# Patient Record
Sex: MALE | Race: ASIAN | NOT HISPANIC OR LATINO | Employment: PART TIME | URBAN - METROPOLITAN AREA
[De-identification: names, ages, dates, MRNs, and addresses within clinical notes are randomized per-mention and may not be internally consistent; named-entity substitution may affect disease eponyms.]

---

## 2017-10-18 ENCOUNTER — AMBULATORY - HEALTHEAST (OUTPATIENT)
Dept: NURSING | Facility: CLINIC | Age: 37
End: 2017-10-18

## 2017-10-18 DIAGNOSIS — Z23 NEED FOR IMMUNIZATION AGAINST INFLUENZA: ICD-10-CM

## 2018-09-25 ENCOUNTER — AMBULATORY - HEALTHEAST (OUTPATIENT)
Dept: NURSING | Facility: CLINIC | Age: 38
End: 2018-09-25

## 2018-09-25 DIAGNOSIS — Z23 NEED FOR VACCINATION: ICD-10-CM

## 2019-12-05 ENCOUNTER — OFFICE VISIT - HEALTHEAST (OUTPATIENT)
Dept: FAMILY MEDICINE | Facility: CLINIC | Age: 39
End: 2019-12-05

## 2019-12-05 DIAGNOSIS — Z13.220 SCREENING FOR LIPID DISORDERS: ICD-10-CM

## 2019-12-05 DIAGNOSIS — Z72.0 TOBACCO ABUSE: ICD-10-CM

## 2019-12-05 DIAGNOSIS — Z00.00 HEALTHY ADULT ON ROUTINE PHYSICAL EXAMINATION: ICD-10-CM

## 2019-12-05 DIAGNOSIS — M54.42 CHRONIC LEFT-SIDED LOW BACK PAIN WITH LEFT-SIDED SCIATICA: ICD-10-CM

## 2019-12-05 DIAGNOSIS — G57.02 PIRIFORMIS SYNDROME, LEFT: ICD-10-CM

## 2019-12-05 DIAGNOSIS — G89.29 CHRONIC LEFT-SIDED LOW BACK PAIN WITH LEFT-SIDED SCIATICA: ICD-10-CM

## 2019-12-05 LAB
CHOLEST SERPL-MCNC: 250 MG/DL
FASTING STATUS PATIENT QL REPORTED: ABNORMAL
HDLC SERPL-MCNC: 56 MG/DL
LDLC SERPL CALC-MCNC: 132 MG/DL
TRIGL SERPL-MCNC: 312 MG/DL

## 2019-12-05 RX ORDER — IBUPROFEN 200 MG
400 TABLET ORAL EVERY 6 HOURS PRN
Qty: 60 TABLET | Refills: 12 | Status: SHIPPED | OUTPATIENT
Start: 2019-12-05 | End: 2022-12-07

## 2019-12-05 ASSESSMENT — MIFFLIN-ST. JEOR: SCORE: 1464.07

## 2020-01-02 ENCOUNTER — OFFICE VISIT - HEALTHEAST (OUTPATIENT)
Dept: PHYSICAL THERAPY | Facility: REHABILITATION | Age: 40
End: 2020-01-02

## 2020-01-02 DIAGNOSIS — M54.16 CHRONIC LEFT LUMBAR RADICULOPATHY: ICD-10-CM

## 2020-01-02 DIAGNOSIS — M53.86 DECREASED ROM OF LUMBAR SPINE: ICD-10-CM

## 2020-01-02 DIAGNOSIS — R19.8 ABDOMINAL WEAKNESS: ICD-10-CM

## 2020-01-09 ENCOUNTER — OFFICE VISIT - HEALTHEAST (OUTPATIENT)
Dept: PHYSICAL THERAPY | Facility: REHABILITATION | Age: 40
End: 2020-01-09

## 2020-01-09 DIAGNOSIS — R19.8 ABDOMINAL WEAKNESS: ICD-10-CM

## 2020-01-09 DIAGNOSIS — M53.86 DECREASED ROM OF LUMBAR SPINE: ICD-10-CM

## 2020-01-09 DIAGNOSIS — M54.16 CHRONIC LEFT LUMBAR RADICULOPATHY: ICD-10-CM

## 2020-01-16 ENCOUNTER — OFFICE VISIT - HEALTHEAST (OUTPATIENT)
Dept: PHYSICAL THERAPY | Facility: REHABILITATION | Age: 40
End: 2020-01-16

## 2020-01-16 DIAGNOSIS — M53.86 DECREASED ROM OF LUMBAR SPINE: ICD-10-CM

## 2020-01-16 DIAGNOSIS — R19.8 ABDOMINAL WEAKNESS: ICD-10-CM

## 2020-01-16 DIAGNOSIS — M54.16 CHRONIC LEFT LUMBAR RADICULOPATHY: ICD-10-CM

## 2020-01-30 ENCOUNTER — OFFICE VISIT - HEALTHEAST (OUTPATIENT)
Dept: FAMILY MEDICINE | Facility: CLINIC | Age: 40
End: 2020-01-30

## 2020-01-30 ENCOUNTER — OFFICE VISIT - HEALTHEAST (OUTPATIENT)
Dept: PHYSICAL THERAPY | Facility: REHABILITATION | Age: 40
End: 2020-01-30

## 2020-01-30 DIAGNOSIS — M54.16 CHRONIC LEFT LUMBAR RADICULOPATHY: ICD-10-CM

## 2020-01-30 DIAGNOSIS — R19.8 ABDOMINAL WEAKNESS: ICD-10-CM

## 2020-01-30 DIAGNOSIS — M54.50 CHRONIC LEFT-SIDED LOW BACK PAIN WITHOUT SCIATICA: ICD-10-CM

## 2020-01-30 DIAGNOSIS — M53.86 DECREASED ROM OF LUMBAR SPINE: ICD-10-CM

## 2020-01-30 DIAGNOSIS — G89.29 CHRONIC LEFT-SIDED LOW BACK PAIN WITHOUT SCIATICA: ICD-10-CM

## 2020-01-30 ASSESSMENT — MIFFLIN-ST. JEOR: SCORE: 1464.65

## 2020-02-06 ENCOUNTER — HOSPITAL ENCOUNTER (OUTPATIENT)
Dept: PHYSICAL MEDICINE AND REHAB | Facility: CLINIC | Age: 40
Discharge: HOME | End: 2020-02-06
Attending: FAMILY MEDICINE

## 2020-02-06 DIAGNOSIS — M54.42 CHRONIC BILATERAL LOW BACK PAIN WITH LEFT-SIDED SCIATICA: ICD-10-CM

## 2020-02-06 DIAGNOSIS — G89.29 CHRONIC BILATERAL LOW BACK PAIN WITH LEFT-SIDED SCIATICA: ICD-10-CM

## 2020-02-06 ASSESSMENT — MIFFLIN-ST. JEOR: SCORE: 1476.66

## 2020-02-20 ENCOUNTER — HOSPITAL ENCOUNTER (OUTPATIENT)
Dept: PHYSICAL MEDICINE AND REHAB | Facility: CLINIC | Age: 40
Discharge: HOME | End: 2020-02-20
Attending: PHYSICIAN ASSISTANT

## 2020-02-20 ENCOUNTER — OFFICE VISIT - HEALTHEAST (OUTPATIENT)
Dept: PHYSICAL THERAPY | Facility: REHABILITATION | Age: 40
End: 2020-02-20

## 2020-02-20 DIAGNOSIS — R19.8 ABDOMINAL WEAKNESS: ICD-10-CM

## 2020-02-20 DIAGNOSIS — G89.29 CHRONIC BILATERAL LOW BACK PAIN WITH LEFT-SIDED SCIATICA: ICD-10-CM

## 2020-02-20 DIAGNOSIS — M54.42 CHRONIC BILATERAL LOW BACK PAIN WITH LEFT-SIDED SCIATICA: ICD-10-CM

## 2020-02-20 DIAGNOSIS — M54.16 CHRONIC LEFT LUMBAR RADICULOPATHY: ICD-10-CM

## 2020-02-20 DIAGNOSIS — M53.86 DECREASED ROM OF LUMBAR SPINE: ICD-10-CM

## 2020-02-28 ENCOUNTER — HOSPITAL ENCOUNTER (OUTPATIENT)
Dept: MRI IMAGING | Facility: HOSPITAL | Age: 40
Discharge: HOME | End: 2020-02-28
Attending: PHYSICIAN ASSISTANT

## 2020-02-28 DIAGNOSIS — M54.42 CHRONIC BILATERAL LOW BACK PAIN WITH LEFT-SIDED SCIATICA: ICD-10-CM

## 2020-02-28 DIAGNOSIS — G89.29 CHRONIC BILATERAL LOW BACK PAIN WITH LEFT-SIDED SCIATICA: ICD-10-CM

## 2020-03-02 ENCOUNTER — COMMUNICATION - HEALTHEAST (OUTPATIENT)
Dept: PHYSICAL MEDICINE AND REHAB | Facility: CLINIC | Age: 40
End: 2020-03-02

## 2020-03-03 ENCOUNTER — COMMUNICATION - HEALTHEAST (OUTPATIENT)
Dept: FAMILY MEDICINE | Facility: CLINIC | Age: 40
End: 2020-03-03

## 2020-03-05 ENCOUNTER — HOSPITAL ENCOUNTER (OUTPATIENT)
Dept: PHYSICAL MEDICINE AND REHAB | Facility: CLINIC | Age: 40
Discharge: HOME | End: 2020-03-05
Attending: PHYSICIAN ASSISTANT

## 2020-03-05 DIAGNOSIS — M51.26 LUMBAR DISC HERNIATION: ICD-10-CM

## 2020-03-05 DIAGNOSIS — M47.816 LUMBAR FACET ARTHROPATHY: ICD-10-CM

## 2020-03-13 ENCOUNTER — COMMUNICATION - HEALTHEAST (OUTPATIENT)
Dept: FAMILY MEDICINE | Facility: CLINIC | Age: 40
End: 2020-03-13

## 2020-03-17 ENCOUNTER — COMMUNICATION - HEALTHEAST (OUTPATIENT)
Dept: PHYSICAL MEDICINE AND REHAB | Facility: CLINIC | Age: 40
End: 2020-03-17

## 2020-03-18 ENCOUNTER — COMMUNICATION - HEALTHEAST (OUTPATIENT)
Dept: PHYSICAL MEDICINE AND REHAB | Facility: CLINIC | Age: 40
End: 2020-03-18

## 2020-04-07 ENCOUNTER — COMMUNICATION - HEALTHEAST (OUTPATIENT)
Dept: PHYSICAL MEDICINE AND REHAB | Facility: CLINIC | Age: 40
End: 2020-04-07

## 2021-03-08 ENCOUNTER — OFFICE VISIT - HEALTHEAST (OUTPATIENT)
Dept: FAMILY MEDICINE | Facility: CLINIC | Age: 41
End: 2021-03-08

## 2021-03-08 DIAGNOSIS — H93.13 TINNITUS, BILATERAL: ICD-10-CM

## 2021-03-08 DIAGNOSIS — G89.29 CHRONIC LEFT-SIDED LOW BACK PAIN WITH LEFT-SIDED SCIATICA: ICD-10-CM

## 2021-03-08 DIAGNOSIS — M54.42 CHRONIC LEFT-SIDED LOW BACK PAIN WITH LEFT-SIDED SCIATICA: ICD-10-CM

## 2021-03-08 DIAGNOSIS — Z72.0 TOBACCO ABUSE: ICD-10-CM

## 2021-03-08 DIAGNOSIS — Z13.220 SCREENING FOR LIPID DISORDERS: ICD-10-CM

## 2021-03-08 LAB
CHOLEST SERPL-MCNC: 262 MG/DL
FASTING STATUS PATIENT QL REPORTED: ABNORMAL
HDLC SERPL-MCNC: 38 MG/DL
LDLC SERPL CALC-MCNC: 180 MG/DL
LDLC SERPL CALC-MCNC: ABNORMAL MG/DL
TRIGL SERPL-MCNC: 432 MG/DL

## 2021-03-08 RX ORDER — LORATADINE 10 MG/1
10 TABLET ORAL DAILY
Qty: 30 TABLET | Refills: 11 | Status: SHIPPED | OUTPATIENT
Start: 2021-03-08 | End: 2022-12-07

## 2021-03-09 ENCOUNTER — HOSPITAL ENCOUNTER (OUTPATIENT)
Dept: PHYSICAL MEDICINE AND REHAB | Facility: CLINIC | Age: 41
Discharge: HOME | End: 2021-03-09
Attending: PHYSICIAN ASSISTANT

## 2021-03-09 DIAGNOSIS — M51.26 LUMBAR DISC HERNIATION: ICD-10-CM

## 2021-03-09 DIAGNOSIS — M47.816 LUMBAR FACET ARTHROPATHY: ICD-10-CM

## 2021-03-09 DIAGNOSIS — M54.16 LUMBAR RADICULAR PAIN: ICD-10-CM

## 2021-03-09 RX ORDER — GABAPENTIN 300 MG/1
300 CAPSULE ORAL DAILY
Qty: 90 CAPSULE | Refills: 2 | Status: SHIPPED | OUTPATIENT
Start: 2021-03-09 | End: 2022-03-09

## 2021-03-09 ASSESSMENT — MIFFLIN-ST. JEOR: SCORE: 1585.98

## 2021-03-29 ENCOUNTER — OFFICE VISIT - HEALTHEAST (OUTPATIENT)
Dept: OTOLARYNGOLOGY | Facility: CLINIC | Age: 41
End: 2021-03-29

## 2021-03-29 ENCOUNTER — OFFICE VISIT - HEALTHEAST (OUTPATIENT)
Dept: AUDIOLOGY | Facility: CLINIC | Age: 41
End: 2021-03-29

## 2021-03-29 DIAGNOSIS — H90.0 CONDUCTIVE HEARING LOSS, BILATERAL: ICD-10-CM

## 2021-03-29 DIAGNOSIS — H65.91 MEE (MIDDLE EAR EFFUSION), RIGHT: ICD-10-CM

## 2021-03-29 DIAGNOSIS — Z96.22 S/P TYMPANOSTOMY TUBE PLACEMENT: ICD-10-CM

## 2021-03-29 DIAGNOSIS — H69.93 DYSFUNCTION OF BOTH EUSTACHIAN TUBES: ICD-10-CM

## 2021-03-29 RX ORDER — OFLOXACIN 3 MG/ML
SOLUTION AURICULAR (OTIC)
Qty: 5 ML | Refills: 0 | Status: SHIPPED | OUTPATIENT
Start: 2021-03-29 | End: 2022-12-07

## 2021-04-06 ENCOUNTER — HOSPITAL ENCOUNTER (OUTPATIENT)
Dept: PHYSICAL MEDICINE AND REHAB | Facility: CLINIC | Age: 41
Discharge: HOME | End: 2021-04-06
Attending: PHYSICIAN ASSISTANT

## 2021-04-06 DIAGNOSIS — M47.816 LUMBAR FACET ARTHROPATHY: ICD-10-CM

## 2021-04-06 ASSESSMENT — MIFFLIN-ST. JEOR: SCORE: 1585.98

## 2021-05-04 ENCOUNTER — AMBULATORY - HEALTHEAST (OUTPATIENT)
Dept: OTOLARYNGOLOGY | Facility: CLINIC | Age: 41
End: 2021-05-04

## 2021-05-04 DIAGNOSIS — H69.93 DYSFUNCTION OF BOTH EUSTACHIAN TUBES: ICD-10-CM

## 2021-05-04 DIAGNOSIS — H65.91 MEE (MIDDLE EAR EFFUSION), RIGHT: ICD-10-CM

## 2021-05-04 DIAGNOSIS — H90.0 CONDUCTIVE HEARING LOSS, BILATERAL: ICD-10-CM

## 2021-05-05 ENCOUNTER — COMMUNICATION - HEALTHEAST (OUTPATIENT)
Dept: PHYSICAL MEDICINE AND REHAB | Facility: CLINIC | Age: 41
End: 2021-05-05

## 2021-05-06 ENCOUNTER — HOSPITAL ENCOUNTER (OUTPATIENT)
Dept: PHYSICAL MEDICINE AND REHAB | Facility: CLINIC | Age: 41
Discharge: HOME | End: 2021-05-06
Attending: PHYSICIAN ASSISTANT

## 2021-05-06 DIAGNOSIS — M47.816 LUMBAR FACET ARTHROPATHY: ICD-10-CM

## 2021-05-26 ENCOUNTER — HOSPITAL ENCOUNTER (OUTPATIENT)
Dept: PHYSICAL MEDICINE AND REHAB | Facility: CLINIC | Age: 41
Discharge: HOME | End: 2021-05-26
Attending: PHYSICIAN ASSISTANT

## 2021-05-26 DIAGNOSIS — M47.816 LUMBAR FACET ARTHROPATHY: ICD-10-CM

## 2021-05-26 DIAGNOSIS — M25.552 HIP PAIN, LEFT: ICD-10-CM

## 2021-05-26 DIAGNOSIS — M79.18 MYOFASCIAL PAIN: ICD-10-CM

## 2021-05-26 ASSESSMENT — MIFFLIN-ST. JEOR: SCORE: 1585.98

## 2021-06-04 VITALS
WEIGHT: 139.5 LBS | HEIGHT: 65 IN | RESPIRATION RATE: 18 BRPM | DIASTOLIC BLOOD PRESSURE: 64 MMHG | BODY MASS INDEX: 23.24 KG/M2 | SYSTOLIC BLOOD PRESSURE: 110 MMHG | HEART RATE: 91 BPM | OXYGEN SATURATION: 100 % | TEMPERATURE: 98.6 F

## 2021-06-04 VITALS
DIASTOLIC BLOOD PRESSURE: 60 MMHG | SYSTOLIC BLOOD PRESSURE: 106 MMHG | BODY MASS INDEX: 23.07 KG/M2 | OXYGEN SATURATION: 98 % | TEMPERATURE: 97.8 F | WEIGHT: 138.5 LBS | HEIGHT: 65 IN | HEART RATE: 85 BPM | RESPIRATION RATE: 18 BRPM

## 2021-06-04 VITALS — WEIGHT: 136.9 LBS | HEIGHT: 67 IN | BODY MASS INDEX: 21.49 KG/M2

## 2021-06-04 NOTE — PROGRESS NOTES
[facility] Rehabilitation Certification Request    January 2, 2020      Patient: Beh G Dwe  MR Number: [id number]  YOB: 1980  Date of Visit: 1/2/2020      Dear Dr. Taylor Holden:    Thank you for this referral.   We are seeing Beh Dwe in Physical Therapy for left lumbar radiculopathy.    Medicare and/or Medicaid requires physician review and approval of the treatment plan. Please review the plan of care and verify that you agree with the therapy plan of care by co-signing this note.      Plan of Care  Authorization / Certification Start Date: 01/02/20  Authorization / Certification End Date: 04/01/20  Authorization / Certification Number of Visits: 12  Communication with: Referral Source  Patient Related Instruction: Nature of Condition;Treatment plan and rationale;Self Care instruction;Basis of treatment;Body mechanics;Posture;Precautions;Next steps;Expected outcome  Times per Week: 1-2  Number of Weeks: 8  Number of Visits: 12  Precautions / Restrictions : none  Therapeutic Exercise: ROM;Stretching;Strengthening  Neuromuscular Reeducation: kinesio tape;posture;core  Manual Therapy: joint mobilization;soft tissue mobilization;myofascial release  Modalities: traction;TENS;hot pack (PRN)      Goals:  Pt. will be independent with home exercise program in : 6 weeks  Pt. will bend: to dress;to clean;with no pain;Comment  Comment:: in 8 weeks.  Patient will twist/turn : in bed;while standing;with no pain;Comment  Comment:: in 8 weeks.  Patient will sit: 60 minutes;with no pain;Comment  Comment: in 8 weeks.    Patient will decrease : HOMAR score;for improved quality of life;by _ points;in 6 weeks  by ___ points: >= 30% from IE  Patient will show increased : strength for ____  Patient will show increased strength for : lifting/carrying up to 40 lb at work without pain in 8 weeks.        If you have any questions or concerns, please don't hesitate to call.    Sincerely,      Ronald Davis,  PT        Physician recommendation:     ___ Follow therapist's recommendation        ___ Modify therapy      *Physician co-signature indicates they certify the need for these services furnished within this plan and while under their care.    [facility]   Initial Evaluation    Patient Name: Beh G Dwe  Date of evaluation: 1/2/2020  PRECAUTIONS: none  Referral Diagnosis:   M54.42, G89.29 (ICD-10-CM) - Chronic left-sided low back pain with left-sided sciatica   G57.02 (ICD-10-CM) - Piriformis syndrome, left     Referring provider: Tyalor Holden MD  Visit Diagnosis:     ICD-10-CM    1. Chronic left lumbar radiculopathy M54.16    2. Decreased ROM of lumbar spine M53.86    3. Abdominal weakness R19.8        Assessment:      Pt. is appropriate for skilled PT intervention as outlined in the Plan of Care (POC).  Pt. is a good candidate for skilled PT services to improve pain levels and function.  Goals and POC established in collaboration with the patient.  Pt presents to PT with chronic left-sided low back (constant) and L LE (intermittent )radiating pain and paresthesias, most consistent with lumbar radiculopathy within the L5 distribution.  HEP initiated today, and patient with good tolerance for all without peripheralization of sx.    Goals:  Pt. will be independent with home exercise program in : 6 weeks  Pt. will bend: to dress;to clean;with no pain;Comment  Comment:: in 8 weeks.  Patient will twist/turn : in bed;while standing;with no pain;Comment  Comment:: in 8 weeks.  Patient will sit: 60 minutes;with no pain;Comment  Comment: in 8 weeks.    Patient will decrease : HOMAR score;for improved quality of life;by _ points;in 6 weeks  by ___ points: >= 30% from IE  Patient will show increased : strength for ____  Patient will show increased strength for : lifting/carrying up to 40 lb at work without pain in 8 weeks.      Patient's expectations/goals are realistic.    Barriers to Learning or  "Achieving Goals:  Chronicity of the problem.  Language barriers.  Tobacco use     Plan / Patient Instructions:        Plan of Care:   Authorization / Certification Start Date: 20  Authorization / Certification End Date: 20  Authorization / Certification Number of Visits: 12  Communication with: Referral Source  Patient Related Instruction: Nature of Condition;Treatment plan and rationale;Self Care instruction;Basis of treatment;Body mechanics;Posture;Precautions;Next steps;Expected outcome  Times per Week: 1-2  Number of Weeks: 8  Number of Visits: 12  Precautions / Restrictions : none  Therapeutic Exercise: ROM;Stretching;Strengthening  Neuromuscular Reeducation: kinesio tape;posture;core  Manual Therapy: joint mobilization;soft tissue mobilization;myofascial release  Modalities: traction;TENS;hot pack (PRN)      Plan for next visit: Review HEP, adding ex per flow sheet over time as tolerated.     Subjective:       History of Present Illness:    Beh is a 39 y.o. male who presents to therapy today with complaints of left-sided low back pain with L LE radiating pain, with associated tingling.   Onset: Chronic x5-6 years. Denies specific injury or incident, but relates possibly to a lot of heavier lifting.  Duration: Intermittent L LE pain, but low back is constant  Worse with coughing, first thing in the AM, sitting (10 min max), bending, turning, carrying heavier items  Better with unsure. Reports he is trying to exercise, but is unsure what to do to help his problem  Pain Medication: Denies pain medication use  Sleep: Denies waking due to pain.    No previous low back surgeries.  No changes in bowel or bladder function.    Pt seeks PT to \"help with the pain and be able to do more.\"    Pain Ratin  Pain rating at best: 8  Pain rating at worst: 10  Pain description: aching, dull, pain, sharp and tingling     Objective:      Patient Outcome Measures :    Modified Oswestry Low Back Pain Disablity " Questionnaire  in %: 46     Scores range from 0-100%, where a score of 0% represents minimal pain and maximal function. The minimal clinically important difference is a score reduction of 12%.    Examination  1. Chronic left lumbar radiculopathy     2. Decreased ROM of lumbar spine     3. Abdominal weakness       Involved side: Left  Posture Observation:      General sitting posture is  normal.  General standing posture is normal.    Lumbar ROM: All % of WNL  Date:      *Indicate scale AROM AROM AROM   Lumbar Flexion 100 and PF in low back     Lumbar Extension 50 and PF in low back      Right Left Right Left Right Left   Lumbar Sidebending 75 and PF in low back 75 and PF in low back.xp       Lumbar Rotation 75 with mild pain 75       Thoracic Flexion      Thoracic Extension      Thoracic Sidebending         Thoracic Rotation           Slump Test: (+) on L LE, negative on R.  SLR: L 50* with (+) neural tension, R 75*.  Increased muscle spasm and TTP present L lumbar paraspinals. Denies TTP glutes.  (+) pain and hypomobility noted with central and L unilateral PAs to lumbar spine, most notably at L4 and L5.  Repeated extension (MERLINE positioning and prone press ups): Pt reports slight low back discomfort, but no peripheralization of L LE pain (none present when lying prone).  Repeated flexion (DKTC): no peripheralization of L LE pain, and no LBP.  Fair TAC that fatigues quickly.    Treatment Today     TREATMENT MINUTES COMMENTS   Evaluation 20 -Discussed therapy diagnosis, prognosis, POC, relevant anatomy with use of spine model and basis for tx.   Self-care/ Home management     Manual therapy     Neuromuscular Re-education     Therapeutic Activity     Therapeutic Exercises 26 Exercises:  Exercise #1: MERLINE; Prone press ups - H  Comment #1: 60 sec; 10x  Exercise #2: LTR, DKTC - H  Comment #2: 10x2 sec B; x60 sec  Exercise #3: SKTC sciatic nerve glides - H  Comment #3: 10x B  Exercise #4: PPT - H  Comment #4: 10x3 sec  hold  Exercise #6: Bridges - add as able  Comment #6: TB Sidestepping - add as able  Exercise #7: Planks - add as able    Reviewed and discussed to avoid repetitive bending forward to aid healing.   Gait training     Modality__________________                Total 46    Blank areas are intentional and mean the treatment did not include these items.            PT Evaluation Code: (Please list factors)  Patient History/Comorbidities: See above  Examination: Lumbar radiculopathy  Clinical Presentation: Stable  Clinical Decision Making: Low    Patient History/  Comorbidities Examination  (body structures and functions, activity limitations, and/or participation restrictions) Clinical Presentation Clinical Decision Making (Complexity)   No documented Comorbidities or personal factors 1-2 Elements Stable and/or uncomplicated Low   1-2 documented comorbidities or personal factor 3 Elements Evolving clinical presentation with changing characteristics Moderate   3-4 documented comorbidities or personal factors 4 or more Unstable and unpredictable High           Ronald Davis  1/2/2020  8:54 AM

## 2021-06-05 VITALS
DIASTOLIC BLOOD PRESSURE: 80 MMHG | TEMPERATURE: 98.1 F | RESPIRATION RATE: 18 BRPM | WEIGHT: 161 LBS | BODY MASS INDEX: 25.6 KG/M2 | HEART RATE: 92 BPM | OXYGEN SATURATION: 97 % | SYSTOLIC BLOOD PRESSURE: 110 MMHG

## 2021-06-05 VITALS — BODY MASS INDEX: 25.27 KG/M2 | HEIGHT: 67 IN | WEIGHT: 161 LBS

## 2021-06-05 VITALS — WEIGHT: 161 LBS | HEIGHT: 67 IN | BODY MASS INDEX: 25.27 KG/M2

## 2021-06-05 NOTE — PROGRESS NOTES
ASSESSMENT: Beh G Dwe is a 39 y.o. male with past medical history significant for nicotine dependence who presents today for new patient evaluation of a 2-month history of left greater than right low back pain.  Pain previously radiated down the left lower extremity, but that has resolved with physical therapy.  Unfortunately, back pain is getting progressively worse.  Patient has not had any advanced imaging of the lumbar spine.  I think it is likely that the patient has a disc bulge or protrusion on the left at L5-S1 which is healing.  There may also be lumbar facet arthropathy.  Patient reproduction of his low back pain with lumbar facet loading maneuver on the left.  He was neurologically intact on exam.    HOMAR: 32  Who 5: 23    PLAN:  A shared decision making model was used.  The patient's values and choices were respected.  The following represents what was discussed and decided upon by the physician assistant and the patient.  A professional  is present for the visit.    1.  DIAGNOSTIC TESTS: I ordered an MRI lumbar spine for further evaluation.    2.  PHYSICAL THERAPY:  Patient is currently in physical therapy.  He has had 4 sessions of far.  His physical therapist recommends an additional 3-4 sessions.  Encouraged to continue with physical therapy and the home exercises.    3.  MEDICATIONS:    -Nabumetone 500 mg twice daily as needed was prescribed.  He will use this instead of ibuprofen.  -Patient could also use Tylenol as needed.  -Since radicular pain has improved, I did not recommend gabapentin, however, if radicular symptoms were to return, I would recommend adding gabapentin.    4.  INTERVENTIONS: No interventions were ordered.  Patient may benefit from interventional pain management if he fails to improve with conservative treatment, depending on the results of advanced imaging studies.  -Based on his exam today, would likely begin with lumbar facet joint injection.  - However, if he was  experiencing more radicular pain again, I recommend an epidural.    5.  PATIENT EDUCATION: Patient is in agreement the above plan.  All questions were answered.  -We did not discuss smoking cessation at today's visit.  I will plan to bring this up at future visits.    6.  WORK: I provided an updated work note maintaining his light duty work restriction of no lifting more than 10 pounds.  Work restrictions will be reevaluated in about 2 weeks.    7.  FOLLOW-UP:   A nurse will call the patient with the results of his MRI lumbar spine.  At that time he may order an injection.  I will plan to see the patient back in the clinic in 2 weeks to update workability.  If he has questions or concerns in the meantime, he should not hesitate to call.      SUBJECTIVE:  Beh G Dwe  Is a 39 y.o. male who presents today in consultation at request of Dr. Holden for new patient evaluation of low back pain.  Patient reports that he began to have pain about 2 months ago.  He denies any specific injury or event to cause the pain.  He states that initially the pain radiated down the left leg.  He states that with physical therapy the leg pain resolved.  Unfortunately, the back pain is getting progressively worse.  He is referred to our clinic for further evaluation and treatment.  Patient states that he did have a similar episode of pain in 2012 or 2013 which resolved with medication.    Patient complains of left greater than right low back pain.  Pain is located the lumbosacral junction.  Patient is not currently having any pain down the legs.  However, previously the pain radiated into the left buttock, down the posterior thigh, into the posterior calf.  Patient reports that current pain is aggravated with lifting and carrying items.  Pain is alleviated with doing his exercises.  He denies any numbness, tingling, or weakness down the legs.  He denies any loss of bowel or bladder control.  Denies any recent fevers, chills, or  sweats.    Patient is currently in physical therapy.  He has had 4 sessions.  He does his home exercises.  He uses ibuprofen 400 mg as needed.  He states that this helps somewhat.    Patient has been working with a 10 pound lifting restriction.  He works in food production.  Patient states that he thinks he would have increased pain if he tried to lift more than 10 pounds.    Current Outpatient Medications on File Prior to Visit   Medication Sig Dispense Refill     ibuprofen (ADVIL,MOTRIN) 200 MG tablet Take 2 tablets (400 mg total) by mouth every 6 (six) hours as needed for pain. 60 tablet 12       No Known Allergies    Past Medical History:   Diagnosis Date     Tuberculosis     latent TB.  completed a course of INR in 2010        Patient Active Problem List   Diagnosis     Headache     Viral Gastroenteritis     Nicotine Dependence     Acute Sore Throat     Surgical history: None    Family history: None.  Patient reports family is healthy.    Social history: Patient is .  He has 5 children.  He works in fruit production.  He has been working with a 10 pound lifting restriction.  He smokes 4 to 5 cigarettes/day.  He drinks alcohol twice per month.  He denies illicit drug use.    ROS: Positive for joint pain, muscle pain.  Specifically negative for bowel/bladder dysfunction, fevers,chills, appetite changes, unexplained weight loss.   Otherwise 13 systems reviewed are negative.  Please see the patient's intake questionnaire from today for details.      OBJECTIVE:  PHYSICAL EXAMINATION:    CONSTITUTIONAL:  Vital signs as above.  No acute distress.  The patient is well nourished and well groomed.  PSYCHIATRIC:  The patient is awake, alert, oriented to person, place, time and answering questions appropriately with clear speech.    HEENT: Normocephalic, atraumatic.  Sclera clear.  Neck is supple.  SKIN:  Skin over the face, bilateral lower extremities, and posterior torso is clean, dry, intact without rashes.     GAIT:  Gait is non-antalgic.  The patient is able to heel and toe walk without significant difficulty.    STANDING EXAMINATION: Tender to palpation left greater than right lower lumbar paraspinous muscles at L5-S1.  Lumbar flexion intact.  Lumbar extension mildly restricted.  Lumbar facet loading maneuvers reproduce low back pain on the left.  MUSCLE STRENGTH:  The patient has 5/5 strength for the bilateral hip flexors, knee flexors/extensors, ankle dorsiflexors/plantar flexors, great toe extensors, ankle evertors/invertors.  NEUROLOGICAL:  2/4 patellar, and achilles reflexes bilaterally.  Negative Babinski's bilaterally.  No ankle clonus bilaterally. Sensation to light touch is intact in the bilateral L4, L5, and S1 dermatomes.  VASCULAR:  2/4 dorsalis pedis pulses bilaterally.  Bilateral lower extremities are warm.  There is no pitting edema of the bilateral lower extremities.  ABDOMINAL:  Soft, non-distended, non-tender throughout all quadrants.  No pulsatile mass palpated in the left lower quadrant.  LYMPH NODES:  No palpable or tender inguinal lymph nodes.  MUSCULOSKELETAL: Straight leg raise on the left reproduces left-sided low back pain and extends into the left buttock.  Negative straight leg raise right.

## 2021-06-05 NOTE — PROGRESS NOTES
"S:  Beh G Dwe is a 39 y.o. male who comes to the clinic today for  1.  Back pain:  Greatly improved.  It is still present daily but is much less bothersome with the PT and the exercises.  His pain does not keep him from doing his daily activities or working.    He denies any n/t/w, and any bowel or bladder problems.   His worst pain right now is when he wakes up in the am.  He doesn't want to move a lot at that time due to the pain.  As he goes through the day, his pain improves.    At work he is limited to 10lbs of lifting.        I reviewed the pertinent family, social, surgical, medical history.    Has cut way down on smoking. He is down to 1-2 cigarettes per day and is using nicotine patches.      O:  /64   Pulse 91   Temp 98.6  F (37  C) (Oral)   Resp 18   Ht 5' 5\" (1.651 m)   Wt 139 lb 8 oz (63.3 kg)   SpO2 100%   BMI 23.21 kg/m    Gen:  Nad, alert  Ms: Full strength in bilateral lower extremities 5 out of 5.  Deep tendon reflexes are symmetric.  Straight leg raise is negative bilaterally.  Sensation is intact.  He has some mild tenderness along his left paraspinal muscles and lumbar muscles.  No tenderness over his lumbar spine.    Patient Active Problem List   Diagnosis     Headache     Viral Gastroenteritis     Nicotine Dependence     Acute Sore Throat     Current Outpatient Medications on File Prior to Visit   Medication Sig Dispense Refill     ibuprofen (ADVIL,MOTRIN) 200 MG tablet Take 2 tablets (400 mg total) by mouth every 6 (six) hours as needed for pain. 60 tablet 12     No current facility-administered medications on file prior to visit.           No results found for this or any previous visit (from the past 48 hour(s)).     No images are attached to the encounter or orders placed in the encounter.       Assessment/Plan:  1. Chronic left-sided low back pain without sciatica  To spine clinic  Maintain 10lb weight limit at work for now  Continue with physical therapy.  I did commend him " on his efforts to quit smoking and encouraged him in that direction.  - Ambulatory referral to [facility]          Taylor Holden   1/30/2020 10:17 AM

## 2021-06-05 NOTE — PROGRESS NOTES
[facility] Daily Progress     Patient Name: Beh G Dwe  Date: 2020  Visit #: 3/12 through 20  PTA visit #: 1   PRECAUTIONS: none  Referral Diagnosis:   M54.42, G89.29 (ICD-10-CM) - Chronic left-sided low back pain with left-sided sciatica   G57.02 (ICD-10-CM) - Piriformis syndrome, left     Referring provider: Taylor Holden MD  Visit Diagnosis:     ICD-10-CM    1. Chronic left lumbar radiculopathy M54.16    2. Decreased ROM of lumbar spine M53.86    3. Abdominal weakness R19.8          Assessment:     Pt presents to PT with chronic left-sided low back (constant) and L LE (intermittent) radiating pain and paresthesias, most consistent with lumbar radiculopathy within the L5 distribution.  HEP/POC compliance is  good .  Patient is appropriate to continue with skilled physical therapy intervention, as indicated by initial plan of care.     Although patient reports no overall change in pain thus far, he is compliant with HEP and without peripheralization of sx during performance. He reports decreased intensity of low back pain today with MT.    Goal Status: Ongoing  Pt. will be independent with home exercise program in : 6 weeks  Pt. will bend: to dress;to clean;with no pain;Comment  Comment:: in 8 weeks.  Patient will twist/turn : in bed;while standing;with no pain;Comment  Comment:: in 8 weeks.  Patient will sit: 60 minutes;with no pain;Comment  Comment: in 8 weeks.    Patient will decrease : HOMAR score;for improved quality of life;by _ points;in 6 weeks  by ___ points: >= 30% from IE  Patient will show increased : strength for ____  Patient will show increased strength for : lifting/carrying up to 40 lb at work without pain in 8 weeks.      Plan / Patient Education:     Review forward planks  Continue MT per below, KT as needed    Subjective:     Pain Ratin/10 low back   Working standing and lifting  Denies leg pain  KT feels good seems to help  Ex 2x/day  Walking 10 mins   Sitting 10  mins than increase aching    Objective:      present throughout session.  Reviewed HEP with min cueing for correct performance of ex. Denies L LE radiating pain with any ex.  Moderately increased muscle spasm present lumbar paraspinals; improved with MT, with patient reporting decreased intensity of LBP.  Cues for HEP needed    Exercises:  Exercise #1: MERLINE; Prone press ups - H  Comment #1: 60 sec; 5x  Exercise #2: LTR, DKTC - H  Comment #2: 10x2 sec B; x60 sec  Exercise #3: SKTC sciatic nerve glides - H  Comment #3: 10x B  Exercise #4: PPT - H  Comment #4: 10x3 sec hold  Exercise #6: Bridges: 10x3 sec- H  Comment #6: TB Sidestepping: 3x10 ft L/R with OTB - H  Exercise #7: Planks - add as able    Treatment Today     TREATMENT MINUTES COMMENTS   Evaluation     Self-care/ Home management     Manual therapy 9 Prone MFR to lumbar paraspinals   Neuromuscular Re-education     Therapeutic Activity     Therapeutic Exercises 18 Ex per flow sheet  KTape to lumbar paraspinals, inhibition   Gait training     Modality__________________                Total 27    Blank areas are intentional and mean the treatment did not include these items.       Ganga Wiggins  1/16/2020

## 2021-06-05 NOTE — PROGRESS NOTES
[facility] Daily Progress     Patient Name: Beh G Dwe  Date: 2020  Visit #:  through 20  PTA visit #:    PRECAUTIONS: none  Referral Diagnosis:   M54.42, G89.29 (ICD-10-CM) - Chronic left-sided low back pain with left-sided sciatica   G57.02 (ICD-10-CM) - Piriformis syndrome, left     Referring provider: Taylor Holden MD  Visit Diagnosis:     ICD-10-CM    1. Chronic left lumbar radiculopathy M54.16    2. Decreased ROM of lumbar spine M53.86    3. Abdominal weakness R19.8          Assessment:     Pt presents to PT with chronic left-sided low back (constant) and L LE (intermittent) radiating pain and paresthesias, most consistent with lumbar radiculopathy within the L5 distribution.  HEP/POC compliance is  good .  Patient is appropriate to continue with skilled physical therapy intervention, as indicated by initial plan of care.     Although patient reports no overall change in pain thus far, he is compliant with HEP and without peripheralization of sx during performance. He reports decreased intensity of low back pain today with MT.    Goal Status:  Pt. will be independent with home exercise program in : 6 weeks  Pt. will bend: to dress;to clean;with no pain;Comment  Comment:: in 8 weeks.  Patient will twist/turn : in bed;while standing;with no pain;Comment  Comment:: in 8 weeks.  Patient will sit: 60 minutes;with no pain;Comment  Comment: in 8 weeks.    Patient will decrease : HOMAR score;for improved quality of life;by _ points;in 6 weeks  by ___ points: >= 30% from IE  Patient will show increased : strength for ____  Patient will show increased strength for : lifting/carrying up to 40 lb at work without pain in 8 weeks.      Plan / Patient Education:     Review newly added bridges, TB sidestepping, and add forward/side planks as able.  Continue MT per below, and assess KTape effectiveness.    Subjective:     Pain Ratin/10 low back, intermittent L LE   Doing HEP regularly. Denies L  LE pain with any exercises.    Objective:      present throughout session.  Reviewed HEP with min cueing for correct performance of ex. Denies L LE radiating pain with any ex.  Moderately increased muscle spasm present lumbar paraspinals; improved with MT, with patient reporting decreased intensity of LBP.  Pt education provided on benefits, mechanism, use, and precautions with KTape.    Exercises:  Exercise #1: MERLINE; Prone press ups - H  Comment #1: 60 sec; 5x  Exercise #2: LTR, DKTC - H  Comment #2: 10x2 sec B; x60 sec  Exercise #3: SKTC sciatic nerve glides - H  Comment #3: 10x B  Exercise #4: PPT - H  Comment #4: 10x3 sec hold  Exercise #6: Bridges: 10x3 sec- H  Comment #6: TB Sidestepping: 3x10 ft L/R with OTB - H  Exercise #7: Planks - add as able    Treatment Today     TREATMENT MINUTES COMMENTS   Evaluation     Self-care/ Home management     Manual therapy 9 Prone MFR to lumbar paraspinals   Neuromuscular Re-education     Therapeutic Activity     Therapeutic Exercises 18 Ex per flow sheet  KTape to lumbar paraspinals, inhibition   Gait training     Modality__________________                Total 27    Blank areas are intentional and mean the treatment did not include these items.       Ronald Davis  1/9/2020

## 2021-06-05 NOTE — PROGRESS NOTES
[facility] Daily Progress     Patient Name: Beh G Dwe  Date: 1/30/2020  Visit #: 4/12 through 4/1/20  PTA visit #: 1   PRECAUTIONS: none  Referral Diagnosis:   M54.42, G89.29 (ICD-10-CM) - Chronic left-sided low back pain with left-sided sciatica   G57.02 (ICD-10-CM) - Piriformis syndrome, left     Referring provider: Taylor Holden MD  Visit Diagnosis:     ICD-10-CM    1. Chronic left lumbar radiculopathy M54.16    2. Decreased ROM of lumbar spine M53.86    3. Abdominal weakness R19.8          Assessment:     Pt reports significant improvement in pain and function from start of care. Although he does continue to note L-sided low back pain, he is no longer having L LE radiating pain. He is progressing quite well towards goals, but would benefit from 3-4 additional visits with PT to further progress/develop HEP to ensure successful completion of goals.    HEP/POC compliance is  good .  Patient is benefitting from skilled physical therapy and is making steady progress toward functional goals.  Patient is appropriate to continue with skilled physical therapy intervention, as indicated by initial plan of care.       Goal Status:   Pt. will be independent with home exercise program in : 6 weeks. PROGRESSING  Pt. will bend: to dress;to clean;with no pain;Comment  Comment:: in 8 weeks. PROGRESSING  Patient will twist/turn : in bed;while standing;with no pain;Comment  Comment:: in 8 weeks. MET  Patient will sit: 60 minutes;with no pain;Comment  Comment: in 8 weeks. PROGRESSING  Patient will decrease : HOMAR score;for improved quality of life;by _ points;in 6 weeks  by ___ points: >= 30% from IE  Patient will show increased : strength for ____  Patient will show increased strength for : lifting/carrying up to 40 lb at work without pain in 8 weeks. PROGRESSING      Plan / Patient Education:     Review forward/side planks, PPT with alternating LE extension, and SKTC exercises.  Continue MT to L low back to  reduce pain.    Subjective:     Pain Rating: Did not rate, L low back   Overall, reports to be doing much better than before/start of PT.  No longer getting any pain down the L LE.  Denies pain twisting/turning in bed.  Reports sitting is much better- able to tolerate about 30 min.  Does still get pain when bending to put on socks/shoes.  Cautious with lifting/carrying at work. Still on 10 lb lifting restrictions.  Regarding KTape, he reports it does feel good and he finds to be helpful, but was having some really bad skin itching and irritation last time. Will hold from PT treatment.    Objective:      present throughout session.  Reviewed and updated HEP to reflect the patient's improvements, current status, and remaining functional limitations.    Exercises:  Exercise #1: Prone press ups - H  Comment #1: 10x (may decrease to 1x/day)  Exercise #2: LTR, SKTC, DKTC - H  Comment #2: 10x B; 2x30 sec B; 2x30 sec  Exercise #3: SKTC sciatic nerve glides - H  Comment #3: 10x B  Exercise #4: PPT with alternating LE extension - H  Comment #4: 15x B  Exercise #6: Bridges with feet flexed: 3x10 sec- H  Comment #6: TB Sidestepping: 3x10 ft L/R with GTB - H  Exercise #7: Planks - H  Comment #7: 2x30 sec F on toes, x30 sec L/R on knees    Treatment Today     TREATMENT MINUTES COMMENTS   Evaluation     Self-care/ Home management     Manual therapy  Prone MFR to lumbar paraspinals  *not performed today   Neuromuscular Re-education     Therapeutic Activity     Therapeutic Exercises 25 Ex per flow sheet   Gait training     Modality__________________                Total 25    Blank areas are intentional and mean the treatment did not include these items.       Ronald Davis  1/30/2020

## 2021-06-06 NOTE — TELEPHONE ENCOUNTER
CMT collected forms from . Forms pre-filled for provider review, completion, and signature. Pre-filled forms placed in provider s blue folder. Thanks.

## 2021-06-06 NOTE — PROGRESS NOTES
[facility] Daily Progress     Patient Name: Beh G Dwe  Date: 2/20/2020  Visit #: 5/12 through 4/1/20  PTA visit #: 1   PRECAUTIONS: none  Referral Diagnosis:   M54.42, G89.29 (ICD-10-CM) - Chronic left-sided low back pain with left-sided sciatica   G57.02 (ICD-10-CM) - Piriformis syndrome, left     Referring provider: Taylor Holden MD  Visit Diagnosis:     ICD-10-CM    1. Chronic left lumbar radiculopathy M54.16    2. Decreased ROM of lumbar spine M53.86    3. Abdominal weakness R19.8          Assessment:     Pt reports significant improvement in pain and function from start of care. Although he does continue to note L-sided low back pain, he is no longer having L LE radiating pain. He is progressing quite well towards goals, but would benefit from 1-2 additional visits with PT to further progress/develop HEP to ensure successful completion of goals.    HEP/POC compliance is  good .  Patient is benefitting from skilled physical therapy and is making steady progress toward functional goals.  Patient is appropriate to continue with skilled physical therapy intervention, as indicated by initial plan of care.       Goal Status:   Pt. will be independent with home exercise program in : 6 weeks. PROGRESSING  Pt. will bend: to dress;to clean;with no pain;Comment  Comment:: in 8 weeks. PROGRESSING  Patient will twist/turn : in bed;while standing;with no pain;Comment  Comment:: in 8 weeks. MET  Patient will sit: 60 minutes;with no pain;Comment  Comment: in 8 weeks. PROGRESSING  Patient will decrease : HOMAR score;for improved quality of life;by _ points;in 6 weeks  by ___ points: >= 30% from IE. NOT ASSESSED  Patient will show increased : strength for ____  Patient will show increased strength for : lifting/carrying up to 40 lb at work without pain in 8 weeks. PROGRESSING    Plan / Patient Education:     Review newly added piriformis stretch.  Continue MT to L low back, gluteal area to reduce  pain.    Subjective:     Pain Ratin/10, L low back   Overall, reports to be doing much better than before/start of PT.  No longer getting any pain down the L LE.  Denies pain twisting/turning in bed.  Reports sitting is much better- able to tolerate about 20 min.  Notes pain when bending to put on socks/shoes is better, but not gone completely.  Cautious with lifting/carrying at work. Still on 10 lb lifting restrictions. He is thinking he would like to have a few months off of work? He is a bit unclear re: this.    Objective:      present throughout session, although arrived 10 min late.  Reviewed and updated HEP to reflect the patient's improvements, current status, and remaining functional limitations.  TTP with increased muscle spasm present L superior glute, piriformis.    Exercises:  Exercise #1: Prone press ups - H  Comment #1: discontinued  Exercise #2: LTR, SKTC, DKTC - H  Comment #2: 10x B; 2x30 sec B; 2x30 sec  Exercise #3: SKTC sciatic nerve glides - H  Comment #3: HEP  Exercise #4: PPT with alternating LE extension - H  Comment #4: 15x B  Exercise #6: Bridges with feet flexed: 3x10 sec- H  Comment #6: TB Sidestepping: verbal review with GTB - H  Exercise #7: Planks - H  Comment #7: verbal review: forward on toes, lateral on knees  Exercise #8: Seated PIRI ER stretch - H  Comment #8: 2x30 sec bilat    Treatment Today     TREATMENT MINUTES COMMENTS   Evaluation     Self-care/ Home management     Manual therapy 8 Prone STM to L superior glute/piriformis   Neuromuscular Re-education     Therapeutic Activity     Therapeutic Exercises 17 Ex per flow sheet   Gait training     Modality__________________                Total 25    Blank areas are intentional and mean the treatment did not include these items.       Ronald Susan  2020     [facility] Discharge Summary  Patient Name: Beh G Dwe  Date: 2020  Referral Diagnosis:   M54.42, G89.29 (ICD-10-CM) - Chronic left-sided  low back pain with left-sided sciatica   G57.02 (ICD-10-CM) - Piriformis syndrome, left     Referring provider: Taylor Holedn MD  Visit Diagnosis:   1. Chronic left lumbar radiculopathy     2. Decreased ROM of lumbar spine     3. Abdominal weakness         Goals:  Pt. will be independent with home exercise program in : 6 weeks. PROGRESSING  Pt. will bend: to dress;to clean;with no pain;Comment  Comment:: in 8 weeks. PROGRESSING  Patient will twist/turn : in bed;while standing;with no pain;Comment  Comment:: in 8 weeks. MET  Patient will sit: 60 minutes;with no pain;Comment  Comment: in 8 weeks. PROGRESSING  Patient will decrease : HOMAR score;for improved quality of life;by _ points;in 6 weeks  by ___ points: >= 30% from IE. NOT ASSESSED  Patient will show increased : strength for ____  Patient will show increased strength for : lifting/carrying up to 40 lb at work without pain in 8 weeks. PROGRESSING    Patient was seen for 5 visits from 1/2/20 to 2/20/20 with 0 missed appointments.  As of last visit: Pt reports significant improvement in pain and function from start of care. Although he does continue to note L-sided low back pain, he is no longer having L LE radiating pain. He is progressing quite well towards goals, but would benefit from 1-2 additional visits with PT to further progress/develop HEP to ensure successful completion of goals.  Review of EMR reveals [facility], and that he does not want to continue with PT, but is continuing regularly with his exercises.    Therapy will be discontinued at this time.  The patient will need a new referral to resume.    Thank you for your referral.  Ronald Susan  4/9/2020  9:08 AM

## 2021-06-06 NOTE — TELEPHONE ENCOUNTER
Contacted patient with the use of [facility] to discuss appointment schedule [facility] with Dr Roper today. Patient would like to reschedule at this time. Patient was encouraged to contact [facility] Navigation if symptoms worsen. Pt verbalized understanding.  to contact patient and to reschedule.

## 2021-06-06 NOTE — TELEPHONE ENCOUNTER
Already routed to care team in blue folder. CMT cannot find blue folder so must defer to care team members. If team would like CMT to prep form, please deliver to a CMT. Thanks.

## 2021-06-06 NOTE — TELEPHONE ENCOUNTER
Patient scheduled 03/18/20 for Left L4-5, L5-S1 FJI with Dr Romero with the use on  with [facility]. Unable to contact patient or leave message at this time. Will attempt again prior to scheduled appointment to discuss if patient would like to reschedule 4+ weeks out or if patient feels pain is severe enough to keep scheduled time.

## 2021-06-06 NOTE — TELEPHONE ENCOUNTER
----- Message from Nikia Saleh PA-C sent at 3/2/2020  8:25 AM CST -----  Please call the patient let him know that I reviewed his MRI.  There are wear and tear type changes involving the disc and joints in the lower back which I believe are the source of his pain.  We will review in detail at his follow-up visit.

## 2021-06-06 NOTE — PROGRESS NOTES
Assessment:   Beh G Dwe is a 39 y.o. y.o. male with past medical history significant for nicotine dependence who presents today for follow-up regarding a 2.5-month history of left greater than right low back pain.  Pain previously radiated down the left lower extremity, but that pain has resolved with physical therapy.  Unfortunately, back pain persist.  Patient has not had any advanced imaging of the lumbar spine.  I think the patient likely has a disc bulge or protrusion on the left at L5-S1 which is healing.  There may also be lumbar facet arthropathy.  Patient reproduction of his low back pain with lumbar facet leg maneuvers on the left.  He remains neurologically intact.       Plan:     A shared decision making plan was used.  The patient's values and choices were respected.  The following represents what was discussed and decided upon by the physician assistant and the patient.  A professional  is present for the visit.    1.  DIAGNOSTIC TESTS: Patient scheduled for his MRI lumbar spine February 28, 2020.  No further diagnostic tests were ordered.    2.  PHYSICAL THERAPY: Patient is currently in physical therapy.  He has had 5 sessions of far.  He will continue with physical therapy and the home exercises    3.  MEDICATIONS:    -Methocarbamol 5 mg 3 times daily as needed was prescribed.  -Patient can continues in nabumetone 5 mg twice daily as needed.  He notes that this is more helpful than ibuprofen.  -Patient can also use Tylenol as needed.    4.  INTERVENTIONS: No interventions were ordered.  We will await the results of his MRI lumbar spine.  Based on his exam today, I would likely begin with lumbar facet joint injections if pain fails to improve with conservative treatment.    5.  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  He requests to be off of work completely for 1 to 2 months.  I explained the patient I would not recommend getting off of work  completely.  I explained that the evidence shows us that continuing to work in some capacity actually speeds up healing time.  I did offer to tighten up his restrictions so that he tolerates it better.  We will have him work with the following light duty work restrictions: No lifting more than 10 pounds, avoid repetitive bending and twisting.  Patient notes that twisting and bending do aggravate his back pain.  Work restrictions will remain in place until his follow-up visit in 2 weeks.    6.  FOLLOW-UP: I will see the patient back in the clinic in 2 weeks to review the results of the MRI lumbar spine, update workability, and discuss treatment options.  If he has questions or concerns in the meantime, he should not hesitate to call.    Subjective:     Beh G Dwe is a 39 y.o. male who presents today for follow-up regarding left greater than right low back pain.  I saw the patient in consultation February 6, 2020.  At that time I ordered an MRI lumbar spine.  MRI lumbar spine is scheduled for February 28.  I recommended he continue with physical therapy.  He has only had one additional session of physical therapy over the past 2 weeks.  I also prescribed nabumetone.  Patient states that nabumetone has been more effective than ibuprofen, but overall he denies any significant change in his pain.    Patient complains of left greater than right low back pain.  Pain is located the lumbosacral junction.  He denies any pain radiating in the buttocks or down the legs.  Patient previously had left leg pain, but that has resolved.  Patient states that he gets a tingling sensation in the lower back.  He denies weakness.  He rates his pain today as an 8 of 10.  At its worst it is a 10-10.  Pain is aggravated with moving, especially bending, lifting, and twisting.  Pain is alleviated with sleeping.  He denies any new symptoms since he was last seen.    Patient is currently in physical therapy.  He has had 5 sessions so far.  He  states that he does his home exercises.  He uses nabumetone 5 mg twice daily and Tylenol as needed.    Past medical history is reviewed and is unchanged.    Review of Systems:  Positive for numbness/tingling (in back).  Negative for weakness, loss of bowel/bladder control, inability to urinate, headache, pain much worse at night, trip/stumble/falls, difficulty swallowing, difficulty with hand skills, fevers, unintentional weight loss.     Objective:   CONSTITUTIONAL:  Vital signs as above.  No acute distress.  The patient is well nourished and well groomed.    PSYCHIATRIC:  The patient is awake, alert, oriented to person, place and time.  The patient is answering questions appropriately with clear speech.  Normal affect.  HEENT: Normocephalic, atraumatic.  Sclera clear.    SKIN:  Skin over the face, posterior torso, bilateral upper and lower extremities is clean, dry, intact without rashes.  VASCULAR: No significant lower extremity edema.  MUSCULOSKELETAL:  Gait is non-antalgic.  The patient is able to heel and toe walk without any difficulty.  No significant tenderness over the bilateral lower lumbar paraspinal muscles.   Lumbar flexion intact.  Lumbar extension mildly restricted with reproduction of pain.  Lumbar facet maneuvers increased low back pain on the left.   The patient has 5/5 strength for the bilateral hip flexors, knee flexors/extensors, ankle dorsiflexors/plantar flexors, ankle evertors/invertors.    NEUROLOGICAL: 2+ patellar, achilles reflexes which are symmetric bilaterally.  No ankle clonus bilaterally.  Sensation to light touch is intact in the bilateral L4, L5, and S1 dermatomes.

## 2021-06-06 NOTE — TELEPHONE ENCOUNTER
Please call the pt to find out what is going on with his workplace.  It is my understanding that he is still going to work regularly, just with restrictions in place.  Can you please confirm this, as this is what was recommended by spine, and what I will adhere to.  He was recommended to continue working, with work restrictions.      Thank you.

## 2021-06-06 NOTE — TELEPHONE ENCOUNTER
Call to pt with results and recommendations, using assistance of  #[phone]. Unable to leave message as mailbox full. Pt is scheduled for follow-up with provider on 3/5.

## 2021-06-06 NOTE — TELEPHONE ENCOUNTER
Name of form/paperwork: [place]  Have you been seen for this request:   Do we have the form: YES, BLUE FOLDER  When is form needed by: 03/17/20  How would you like the form returned: FAX  TO EMPLOYER   and Phone#:FREDDIE RODAS  Fax Number: [phone]  Patient Notified form requests are processed in 3-5 business days: NEEDS TO BE FAXED NO LATER THAN 03/17/20  Okay to leave a detailed message?

## 2021-06-06 NOTE — PROGRESS NOTES
Assessment:   Beh G Dwe is a 39 y.o. y.o. male with past medical history significant for nicotine dependence who presents today for follow-up regarding a 3-month history of left low back pain.  Pain previously radiated down the left lower extremity, but that pain has resolved with physical therapy.  Unfortunately, back pain persists.  MRI lumbar spine showed degenerative disc disease with endplate changes at L4-5.  There is a paracentral disc protrusion at this level with mass-effect on the left L5 nerve root.  There is also lower lumbar facet arthropathy.  On exam today, patient seems symptomatic from the lumbar facet arthropathy.  He had reproduction of his pain with lumbar facet loading maneuvers on the left.  He was neurologically intact.       Plan:     A shared decision making plan was used.  The patient's values and choices were respected.  The following represents what was discussed and decided upon by the physician assistant and the patient.  A professional  is present for the visit.    1.  DIAGNOSTIC TESTS: I reviewed the MRI lumbar spine in detail with the patient with the help of a spine model.  No further diagnostic tests were ordered.    2.  PHYSICAL THERAPY: Patient attended 5 sessions of physical therapy.  He will continue with the home exercises for now.  No further physical therapy was ordered.    3.  MEDICATIONS: No changes are made to the patient's medications.  -Patient can continue using methocarbamol as needed.  -Patient can continues in nabumetone 500 mg twice daily as needed.  He notes that this is more helpful than ibuprofen.  -Patient can also use Tylenol as needed.    4.  INTERVENTIONS: I recommended left L4-5, L5-S1 facet joint injections.  Patient indicated he would like to proceed and an order was placed.  -If this does not help, we could try left L4-5, L5-S1 transforaminal epidural steroid injection.    5.  WORK: Patient states that he is currently on a leave from work due  to his back pain.  Hopefully he will be able to get back to work after his injections.    6.  FOLLOW-UP: I will see the patient back in the clinic for a 2-week post procedure follow-up visit after his left L4-5, L5-S1 facet joint injection.  If he has questions or concerns in the meantime, he should not hesitate to call.  Subjective:     Beh G Dwe is a 39 y.o. male who presents today for follow-up regarding left low back pain.  I last saw the patient on February 20, 2020.  At that time I recommended he continue physical therapy.  He did not do so.  I prescribed methocarbamol.  Denies any change in his pain since he was last seen.    Patient complains of left-sided low back pain.  Pain is located in the lower lumbar region.  He denies any pain radiating in the buttock or down the leg.  He denies any right-sided pain.  He denies any numbness, tingling, or weakness.  He rates his pain today as an 8 out of 10.  At its worst it is an 8 of 10.  Pain is aggravated with sitting, standing, walking.  Pain is alleviated with taking medications.    Patient attended 5 sessions of physical therapy.  He states that he does his home exercises.  He uses methocarbamol 5 mg daily.  He also uses nabumetone 500 mg twice daily and Tylenol as needed.    Past medical history is reviewed and is unchanged.    I had put the patient on light duty work restrictions.  Patient states that he is taking a leave from work.  I think he is referring to short-term disability.    Review of Systems:  Negative for numbness/tingling, weakness, loss of bowel/bladder control, inability to urinate, headache, pain much worse at night, trip/stumble/falls, difficulty swallowing, difficulty with hand skills, fevers, unintentional weight loss.     Objective:   CONSTITUTIONAL:  Vital signs as above.  No acute distress.  The patient is well nourished and well groomed.    PSYCHIATRIC:  The patient is awake, alert, oriented to person, place and time.  The patient is  answering questions appropriately with clear speech.  Normal affect.  HEENT: Normocephalic, atraumatic.  Sclera clear.    SKIN:  Skin over the face, posterior torso, bilateral upper and lower extremities is clean, dry, intact without rashes.  VASCULAR: No significant lower extremity edema.  MUSCULOSKELETAL:  Gait is non-antalgic.  The patient is able to heel and toe walk without any difficulty.  Mild tenderness palpation left lower lumbar paraspinous muscles.  Lumbar flexion intact.  Lumbar extension intact.  Lumbar facet loading maneuvers increased low back pain on the left.   The patient has 5/5 strength for the bilateral hip flexors, knee flexors/extensors, ankle dorsiflexors/plantar flexors, ankle evertors/invertors.    NEUROLOGICAL: 2+ patellar, achilles reflexes which are symmetric bilaterally.  No ankle clonus bilaterally.  Sensation to light touch is intact in the bilateral L4, L5, and S1 dermatomes.        RESULTS: I reviewed the MRI lumbar spine from [facility] dated February 20, 2020.  At L4-5 there is degenerative disc disease with endplate signal changes.  There is a mild diffuse disc bulge with small left paracentral disc extrusion with mass-effect upon the traversing left L5 nerve root.  There is mild spinal canal stenosis and mild bilateral foraminal stenosis at this level.  At L3-4 there is a minimal diffuse disc bulge and slight facet arthropathy with mild left foraminal stenosis.  At L5-S1 there is a mild diffuse disc bulge and mild facet arthropathy with moderate right foraminal stenosis.  Please see report for further details.

## 2021-06-06 NOTE — TELEPHONE ENCOUNTER
Called both home, and mobile - no answer and no VM.  Will call again tomorrow.  [facility] DID advise pt to lift no more than 10 pounds and avoid all twisting and bending until sees Spine in 2 weeks.  Pt did not wish to work but Spine advised that is not a good plan.  Will try calling tomorrow. Thanks.

## 2021-06-06 NOTE — TELEPHONE ENCOUNTER
Called pt at home and cell as well as spouse's cell.  All called x 3 with no , no VM.  Will Send letter to pt home address stating message.  Thanks.

## 2021-06-07 NOTE — TELEPHONE ENCOUNTER
Attempted to contact patient (with assistance of  phone line) to discuss care plan recommendations, but the listed phone numbers for both he and his spouse were not in service.      Current recommendations by the [facility] are for the patient to reschedule his injection appointment with Dr. Romero for 4/14/20 for 5/18/20 or later given the safety concerns with the COVID-19 outbreak.    A letter will be drafted in the [place] language by the [facility] language services team and mailed to the patient's home address detailing the reason for the recommended rescheduling.  All other modes of communication have been unsuccessful to this point.

## 2021-06-15 NOTE — PROGRESS NOTES
S:  Beh G Dwe is a 40 y.o. male who comes to the clinic today for  1.  Complains of ear checking.  He had some ringing in his ears.  It started in February of this year.  It started on both sides, but the right side is worse.  He is not exposed to any really loud sounds.    He does not have a headache.  No dizziness or balance problems.    No vision changes . No drainage from his ears.  No pain in his ears.    He feels like his hearing may be diminished.  No history of exposure to really loud sounds .   No prior history of chronic ear infections that he knows of.      2.  Back pain;  His follow ups were cancelled due to covid 19.  He continues ot have significant pain in his back that radiates down into his legs.  He denies any bowel or bladder problems.  No significant weakness on that side.  No foot drop.  He cannot lift anything heavy, or it makes the pain worse.  His limit is about 20 lbs.  His pain is always present, but less than it was in the past.  No difficulty with walking.      I reviewed the pertinent family, social, surgical, medical history.    Soc hx:  He tried to quit smoking but failed.  Even with the patch it was hard.    He says he wants to quit smoking . He has a lot of cravings.      O:  /80   Pulse 92   Temp 98.1  F (36.7  C) (Oral)   Resp 18   Wt 161 lb (73 kg)   SpO2 97%   BMI 25.60 kg/m    Gen:  Nad, alert  Heent:  Right TM has 2 patches of yellow color.  Some bulging.  Left tm is somewhat scarred, but is still reflective.  No bony tenderness.  External ears and canals are normal.    No lad.  Diminished hearing in right ear more than left.    Nasal congestion noted.    Gait is normal . Strength is 5/5 in bilateral lower extremities.      Patient Active Problem List   Diagnosis     Headache     Viral Gastroenteritis     Nicotine Dependence     Acute Sore Throat     Current Outpatient Medications on File Prior to Visit   Medication Sig Dispense Refill     ibuprofen (ADVIL,MOTRIN)  200 MG tablet Take 2 tablets (400 mg total) by mouth every 6 (six) hours as needed for pain. 60 tablet 12     No current facility-administered medications on file prior to visit.           No results found for this or any previous visit (from the past 48 hour(s)).     No images are attached to the encounter or orders placed in the encounter.       Assessment/Plan:  1. Chronic left-sided low back pain with left-sided sciatica  He would like to talk to spine care again.  They had reviewed injections at his last visit.    Encouraged tobacco cessation.    - Ambulatory referral to [facility]    2. Asymmetrical hearing loss of both ears  Start loratadine and fluticasone.    Refer to ent and audiology .   - loratadine (CLARITIN) 10 mg tablet; Take 1 tablet (10 mg total) by mouth daily.  Dispense: 30 tablet; Refill: 11  - Ambulatory referral to Audiology  - Ambulatory referral to ENT  - fluticasone propionate (FLONASE) 50 mcg/actuation nasal spray; 1 spray into each nostril 2 times a day at 6:00 am and 4:00 pm.  Dispense: 18 g; Refill: 2    3. Tinnitus, bilateral      4. Screening for lipid disorders    - Lipid Cascade    5. Tobacco abuse  I reviewed strategies for quitting smoking.  I encouraged him to try again even though he was not successful the first time.  We did talk about using oral medication but he does not wish to do that at this time he prefers to stick with either gum or patches.  He said he felt a lot of cravings with the patches so we will try nicotine gum at this time.  I did let her know I thought that tobacco cessation was important given his back problems.    Greater than 30 minutes was spent with this patient today, with the majority of time being spent in tobacco cessation counseling, history taking, review of medications, and review of referrals.    He did decline a flu shot and td shot today.  I reviewed covid vaccine safety.      The entire conversation today was conducted through the use of a  professional .  .        Taylor Holden   3/8/2021 3:06 PM

## 2021-06-15 NOTE — PATIENT INSTRUCTIONS - HE
Gabapentin 300mg Dosing Chart    DATE  MORNING AFTERNOON BEDTIME    Day 1 0 0 1    Day 2 0 0 1    Day 3 0 0 1    Day 4 1 0 1    Day 5 1 0 1    Day 6 1 0 1    Day 7 1 1 1    Day 8 1 1 1    Day 9 1 1 1   Continue medication, taking 1 capsules three times daily    Please call if you have any questions regarding how to take your medication  Clinic Phone # [phone]

## 2021-06-15 NOTE — PROGRESS NOTES
Assessment:   Beh G Dwe is a 40 y.o. y.o. male with past medical history significant for nicotine dependence who presents today for follow-up regarding chronic low back pain with radiation into the left lower extremity.  My review of the MRI lumbar spine showed degenerative disc disease with endplate changes at L4-5.  There is a paracentral disc protrusion at this level with mass-effect on the left L5 nerve root.  There is mild bilateral foraminal stenosis L4-5 and moderate right foraminal stenosis L5-S1.  There is also lower lumbar facet arthropathy.  Pain has been persistent despite conservative treatment including 5 sessions of physical therapy.  He was neurologically intact on exam.       Plan:     A shared decision making plan was used.  The patient's values and choices were respected.  The following represents what was discussed and decided upon by the physician assistant and the patient.  A telephone  is present for the visit.    1.  DIAGNOSTIC TESTS: I reviewed the MRI lumbar spine in detail with the patient with the help of a spine model.  No further diagnostic tests were ordered.    2.  PHYSICAL THERAPY: Patient attended 5 sessions of physical therapy ending February 20, 2020.  He is still doing home exercises.  No further physical therapy was ordered.    3.  MEDICATIONS:   -Gabapentin 300 mg was prescribed.  He is given the dosage titration chart.  He will increase his dose up to 300 mg 3 times daily.  We can potentially titrate his dose higher, depending on his response.  -Tylenol has not been helpful.  -Nabumetone was not helpful.  -Methocarbamol was not helpful.    4.  INTERVENTIONS: The patient I discussed a left L4-5, L5-S1 transforaminal epidural steroid injection.  Patient decided he is going to try the gabapentin first.  If this does not provide adequate relief, I would recommend the epidural steroid injection as the next step.  -If leg pain improves but he continues to have axial low  back pain, would recommend left L4-5, L5-S1 facet joint injections.    5.  PATIENT EDUCATION: Patient is in agreement the above plan.  All questions were answered.    6.  FOLLOW-UP: Patient will follow up with me in 4 weeks.  If he has questions or concerns in the meantime, he should not hesitate to call.  Subjective:     Beh G Dwe is a 40 y.o. male who presents today for follow-up regarding chronic left low back pain with radiation into the left lower extremity.  I last saw the patient on March 5, 2020.  He was then lost to follow-up due to the COVID-19 outbreak.  Patient reports that his pain has worsened since last year.    Patient complains of left low back pain.  Pain radiates in the left buttock, down the left posterior thigh, into the lateral calf.  He denies any pain radiating down the right leg.  He rates his pain today as an 8 out of 10.  Pain is aggravated with carrying heavy objects, jumping, and running.  He denies any alleviating factors for his pain.  He denies any numbness or tingling down the leg.  Denies weakness.  Denies any new symptoms.    Patient attended 5 sessions of physical therapy ending February 20, 2020.  He states that he does his home exercises.  He is not currently taking any medications for pain.  He does not feel that any of the medications have been helpful for his pain.    Past medical history is reviewed and is unchanged.    Review of Systems:  Negative for numbness/tingling, weakness, loss of bowel/bladder control, inability to urinate, headache, pain much worse at night, trip/stumble/falls, difficulty swallowing, difficulty with hand skills, fevers, unintentional weight loss.     Objective:   CONSTITUTIONAL:  Vital signs as above.  No acute distress.  The patient is well nourished and well groomed.    PSYCHIATRIC:  The patient is awake, alert, oriented to person, place and time.  The patient is answering questions appropriately with clear speech.  Normal affect.  HEENT:  Normocephalic, atraumatic.  Sclera clear.    SKIN:  Skin over the face, posterior torso, bilateral upper and lower extremities is clean, dry, intact without rashes.  VASCULAR: No significant lower extremity edema.  MUSCULOSKELETAL:  Gait is non-antalgic.  The patient is able to heel and toe walk without any difficulty.  Mild tenderness palpation left lower lumbar paraspinous muscles.  The patient has 5/5 strength for the bilateral hip flexors, knee flexors/extensors, ankle dorsiflexors/plantar flexors, ankle evertors/invertors.    NEUROLOGICAL: 2+ patellar, achilles reflexes which are symmetric bilaterally.  No ankle clonus bilaterally.  Sensation to light touch is intact in the bilateral L4, L5, and S1 dermatomes.        RESULTS: I reviewed the MRI lumbar spine from [facility] dated February 20, 2020.  At L4-5 there is degenerative disc disease with endplate signal changes.  There is a mild diffuse disc bulge with small left paracentral disc extrusion with mass-effect upon the traversing left L5 nerve root.  There is mild spinal canal stenosis and mild bilateral foraminal stenosis at this level.  At L3-4 there is a minimal diffuse disc bulge and slight facet arthropathy with mild left foraminal stenosis.  At L5-S1 there is a mild diffuse disc bulge and mild facet arthropathy with moderate right foraminal stenosis.  Please see report for further details.

## 2021-06-16 NOTE — PROGRESS NOTES
Assessment:   Beh G Dwe is a 41 y.o. y.o. male with past medical history significant for nicotine dependence who presents today for follow-up regarding chronic low back pain.  Left lower extremity pain and paresthesias have resolved.  My review of the MRI lumbar spine showed degenerative disc disease with endplate changes at L4-5.  There is a paracentral disc protrusion at this level with mass-effect on the left L5 nerve root.  There is mild bilateral foraminal stenosis L4-5 and moderate right foraminal stenosis L5-S1.  There is also lower lumbar facet arthropathy.  Patient appears to be more symptomatic from the lumbar facet arthropathy.  He had reproduction of his pain with lumbar facet loading maneuvers on the left.  He was neurologically intact.       Plan:     A shared decision making plan was used.  The patient's values and choices were respected.  The following represents what was discussed and decided upon by the physician assistant and the patient.  A telephone  is present for the visit.    1.  DIAGNOSTIC TESTS: I reviewed the MRI lumbar spine. No further diagnostic tests were ordered.    2.  PHYSICAL THERAPY: Patient attended 5 sessions of physical therapy ending February 20, 2020.  He is still doing home exercises.  No further physical therapy was ordered.    3.  MEDICATIONS:   -Gabapentin was not helpful and caused dizziness.  He has been taking 2 caps at bedtime.  I recommended that he decrease to 1 cap at bedtime for 2 days and then stop the medication.  -Patient can continue ibuprofen as needed.  -Tylenol has not been helpful.  -Nabumetone was not helpful.  -Methocarbamol was not helpful.    4.  INTERVENTIONS:   -I offer the patient a left L4-5, L5-S1 facet joint injection.  Patient is scheduled for his second Covid vaccine April 18, 2021.  I told the patient it is recommended to wait at least 2 weeks after the second Covid vaccine to have the injection.  Patient is comfortable with this  plan.  He will schedule his facet joint injections on or after May 3, 2021.  -If leg pain were to return, we could try an epidural steroid injection on the left at L4-5, L5-S1.    5.  PATIENT EDUCATION: Patient is in agreement the above plan.  All questions were answered.    6.  FOLLOW-UP: Patient will follow up 2 weeks after his left L4-5, L5-S1 facet joint injections.  If he has questions or concerns in the meantime, he should not hesitate to call.  Subjective:     Beh G Dwe is a 41 y.o. male who presents today for follow-up regarding chronic left low back pain which previously radiated into the left lower extremity.  I last saw the patient on March 9, 2021.  At that time I prescribed gabapentin.  Patient ports this medicine has not been helpful and is causing dizziness.    Patient complains of left low back pain.  He states he is no longer having pain radiating down the leg.  Pain extends from the left lower lumbar region into the upper buttock and out towards the lateral hip.  He denies any numbness, tingling, or weakness down the leg.  He rates his pain today as an 8 out of 10.  Pain is aggravated with heavy lifting.  He denies any alleviating factors to the pain.    Patient attended 5 sessions of physical therapy ending February 20, 2020.  He states that he does his home exercises.  He is taking gabapentin 600 mg at bedtime.  He does not think this is helping and it is causing dizziness.  He uses ibuprofen as needed which is somewhat helpful.    Past medical history is reviewed and is unchanged.    Review of Systems:  Negative for numbness/tingling, weakness, loss of bowel/bladder control, inability to urinate, headache, pain much worse at night, trip/stumble/falls, difficulty swallowing, difficulty with hand skills, fevers, unintentional weight loss.     Objective:   CONSTITUTIONAL:  Vital signs as above.  No acute distress.  The patient is well nourished and well groomed.    PSYCHIATRIC:  The patient is  awake, alert, oriented to person, place and time.  The patient is answering questions appropriately with clear speech.  Normal affect.  HEENT: Normocephalic, atraumatic.  Sclera clear.    SKIN:  Skin over the face, posterior torso, bilateral upper and lower extremities is clean, dry, intact without rashes.  VASCULAR: No significant lower extremity edema.  MUSCULOSKELETAL:  Gait is non-antalgic.  The patient is able to heel and toe walk without any difficulty.  Mild tenderness palpation left lower lumbar paraspinous muscles.  Lumbar flexion within normal limits but patient reports increased low back pain at end lumbar flexion.  Lumbar extension moderately restricted due to pain.  Lumbar facet loading maneuvers reproduce low back pain on the left.  The patient has 5/5 strength for the bilateral hip flexors, knee flexors/extensors, ankle dorsiflexors/plantar flexors, ankle evertors/invertors.    NEUROLOGICAL: 1-2+ patellar  reflexes which are symmetric bilaterally.  Sensation to light touch is intact in the bilateral L4, L5, and S1 dermatomes.        RESULTS: I reviewed the MRI lumbar spine from [facility] dated February 20, 2020.  At L4-5 there is degenerative disc disease with endplate signal changes.  There is a mild diffuse disc bulge with small left paracentral disc extrusion with mass-effect upon the traversing left L5 nerve root.  There is mild spinal canal stenosis and mild bilateral foraminal stenosis at this level.  At L3-4 there is a minimal diffuse disc bulge and slight facet arthropathy with mild left foraminal stenosis.  At L5-S1 there is a mild diffuse disc bulge and mild facet arthropathy with moderate right foraminal stenosis.  Please see report for further details.

## 2021-06-16 NOTE — PROGRESS NOTES
CHIEF COMPLAINT:   Hearing Difficulty        HISTORY OF PRESENT ILLNESS    Beh G Dwe   was seen at the behest of [place], Taylor Gomez MD  for hearing loss.     Patient says his hearing has been down since the beginning of the month.  None no associated colds or illnesses.  No dizziness.  Does have right-sided tinnitus.  No history of otologic surgery or otologic disease.             REVIEW OF SYSTEMS    Review of Systems: a 10-system review is reviewed at this encounter.  See scanned document.         PHYSICAL EXAM:        HEAD: Normal appearance and symmetry:  No cutaneous lesions.      EARS:      Examination of the right ear shows some marissa effusion examination of the left ear shows severe tympanic membrane retraction    Procedure note bilateral tympanostomy tube placement    After obtaining written consent patient taken the minor procedure.  On the binocular microscope and attention then turned to the right ear.  A small drop of phenol space in the posterior superior aspect of the tympanic membrane.  A myringotomy was performed.  Marissa fluid is removed with a #3 suction and a Pepperell type tube was placed difficulty    The left-hand side same she was performed no middle ear fluid is encountered on this side.         NOSE:    Dorsum:   straight  Septum: normal  Mucosa:  moist  Inferior turbinates:  normal       ORAL CAVITY/OROPHARYNX:    Lips:  Normal.  Tongue: normal, midline  Mucosa:   no lesions  Tonsils:  1+      NECK:  Trachea:  midline.   Thyroid:  normal   Adenopathy:  none       NEURO:   Alert and Oriented       RESPIRATORY:   Symmetry and Respiratory effort    PSYCH:   normal mood and affect    SKIN:  warm and dry         IMPRESSION:    Encounter Diagnoses   Name Primary?     OWEN (middle ear effusion), right Yes     Conductive hearing loss, bilateral      Dysfunction of both eustachian tubes             RECOMMENDATIONS:    Floxin otic drops as prescribed for the right ear.  Return in 1 month with a  post procedure hearing test.  We will plan on doing nasopharyngoscopy at next visit.

## 2021-06-17 NOTE — PATIENT INSTRUCTIONS - HE
Patient Instructions by Ronald Davis PT at 1/2/2020  9:30 AM     Author: Ronald Davis PT Service: -- Author Type: Physical Therapist    Filed: 1/2/2020 10:22 AM Encounter Date: 1/2/2020 Status: Signed    : Ronald Davis PT (Physical Therapist)        PRONE ON ELBOWS - MERLINE    Lying face down, slowly raise up and prop yourself up on your elbows.    Relax open x1 minutes.  2-3x/day.      PRESS UPS    Lying face down, slowly raise up and arch your back using your arms.    Hold 2 seconds.  5-10 reps.    2-3x/day.      LOWER TRUNK ROTATIONS - LTR    Lying on your back with your knees bent, gently move your knees side-to-side.    Hold 2 seconds.  5-10x each side.    2-3x/day.      DOUBLE KNEE TO CHEST STRETCH - DKTC    While Lying on your back,  hold your knees and gently pull them up towards your chest.    Hold 30 seconds.  2-3x/day.         Lie on your back   - Grab behind your knee slightly pulling your knee to your chest (you can use a towel if needed)   - Straighten the leg as far as you can. Get a nice easy stretch. Do not hold   - Bring the leg down     10-15x each leg.  2-3x/day.     PELVIC TILT    While lying on your back, use your stomach muscles to press your spine downwards towards the ground. Do not move into a painful position.    Hold 3 seconds. 15x.  2-3x/day.

## 2021-06-17 NOTE — PATIENT INSTRUCTIONS - HE
Follow-up visit with SULEMA Waddell in 2 weeks to discuss injection outcome and determine care plan going forward.       DISCHARGE INSTRUCTIONS    During office hours (8:00 a.m.- 4:00 p.m.) questions or concerns may be answered  by calling [facility] Nurses at  [phone].  Messages received after hours will be returned the following business day.      In the case of an emergency, please dial 911 or seek assistance at the nearest Emergency Room/Urgent Care facility.     All Patients:    ? You may experience an increase in your symptoms for the first 2 days (It may take anywhere between 2 days- 2 weeks for the steroid to have maximum effect).    ? You may use ice on the injection site, as frequently as 20 minutes each hour if needed.    ? You may take your pain medicine.    ? You may continue taking your regular medication after your injection. If you have had a Medial Branch Block you may resume pain medication once your pain diary is completed.    ? You may shower. No swimming, tub bath or hot tub for 48 hours.  You may remove your bandaid/bandage as soon as you are home.    ? You may resume light activities, as tolerated.    ? Resume your usual diet as tolerated.    ? It is strongly advised that you do not drive for 1-3 hours post injection.    ? If you have had oral sedation:  Do not drive for 8 hours post injection.      ? If you have had IV sedation:  Do not drive for 24 hours post injection.  Do not operate hazardous machinery or make important personal/business decisions for 24 hours.      POSSIBLE STEROID SIDE EFFECTS (If steroid/cortisone was used for your procedure)    -If you experience these symptoms, it should only last for a short period      Swelling of the legs                Skin redness (flushing)       Mouth (oral) irritation     Blood sugar (glucose) levels              Sweats                      Mood changes    Headache    Sleeplessness    Weakened immune system for up to 14  days, which could increase the risk of kimberly the COVID-19 virus and/or experiencing more severe symptoms of the disease, if exposed.    Decreased effectiveness of the flu vaccine if given within 2 weeks of the steroid.         POSSIBLE PROCEDURE SIDE EFFECTS  -Call the [facility] Center if you are concerned    Increased Pain             Increased numbness/tingling        Nausea/Vomiting            Bruising/bleeding at site        Redness or swelling                                                Difficulty walking        Weakness             Fever greater than 100.5    *In the event of a severe headache after an epidural steroid injection that is relieved by lying down, please call the [facility] to speak with a clinical staff member*

## 2021-06-17 NOTE — PATIENT INSTRUCTIONS - HE
"Patient Instructions by Ronald Davis PT at 1/9/2020 10:00 AM     Author: Ronald Davis PT Service: -- Author Type: Physical Therapist    Filed: 1/9/2020 10:17 AM Encounter Date: 1/9/2020 Status: Signed    : Ronald Davis PT (Physical Therapist)        BRIDGING    While lying on your back, tighten your lower abdominals, squeeze your buttocks and then raise your buttocks off the floor/bed as creating a \"Bridge\" with your body.    Hold 3 seconds. 10x.  2x/day.      ELASTIC BAND LATERAL WALKS     With an elastic band around both ankles, walk to the side while keeping your feet spread apart. Keep your knees bent the entire time.    10 steps left, 10 steps right.  2-3x to fatigue.    1x/day.              "

## 2021-06-17 NOTE — TELEPHONE ENCOUNTER
PHONE CONSENT:    The patient wished to take pre-procedure oral sedation so a phone consent was obtained prior to their injection.  The patient has been offered other conservative treatment (physical therapy, medications, etc.) but has opted to proceed with an injection.  The risks and benefits of the injection (Left L4-5 and L5-S1 facet joint injections) were discussed with the patient over the phone on 5-5-21 at 11:48 am.  Other risks of the injection include infection, bleeding, damage to surrounding structures, nerve injury, permanent weakness, permanent paralysis, worsened pain, soreness, headache, allergic reaction, no change in pain.      His second dose of the COVID vaccine was 4-18-21.    The patient understands that they cannot have symptoms of an infection or be on antibiotics at the time of the injection as this would increase their risk of infection. If they start antibiotics prior to the procedure, they should call the clinic to see if the procedure will need to be rescheduled.  The patient understands that if they start any blood thinners prior to the injection, the provider must be notified immediately.  The patient denies any allergies to iodine contrast dye or iodine products.  The patient denies any symptoms of an active infection (cough, fevers, myalgias) and denies taking antibiotics.  The patient knows not to come in to clinic if they have any symptoms of an active infection, particularly cough, fevers, myalgias.   The patient denies taking any prescription blood thinning medications. The patient denies any allergies to iodine or iodine contrast.       The patient verbalized understanding of the information given. The patient was given the opportunity to ask questions of the physician.  The patient elected to proceed and gave consent over the phone with Violet Rivera LPN as a witness.  Informed consent form was signed by the physician and the witness.

## 2021-06-18 NOTE — PATIENT INSTRUCTIONS - HE
Patient Instructions by Ronald Davis PT at 2/20/2020 11:30 AM     Author: Ronald Davis PT Service: -- Author Type: Physical Therapist    Filed: 2/20/2020 12:03 PM Encounter Date: 2/20/2020 Status: Addendum    : Ronald Davis PT (Physical Therapist)    Related Notes: Original Note by Ronald Davis PT (Physical Therapist) filed at 2/20/2020 11:54 AM       Please discontinue the prone press ups (where you are on your stomach and arching up).    For the bridges/hip lifts, do it with just your heels in the floor.     Use the green band instead of the orange for the sidestepping Monster walks.     Instead of pelvic tilt, you will add a leg kick as shown:   BRACE - SINGLE KNEE EXTENSION     While lying on your back with knees bent, straighten out one knee while keeping the leg off the ground. Hold as indicated, then return to original position. Next, perform on the other leg.     Use your stomach muscles to keep your spine from moving the entire time.     Hold 1-2 seconds.  15x each leg.  1x/day.       LATERAL PLANK     While lying on your side with your knees bent, lift your body up on your elbow. Try and maintain a straight spine.     Hold 30 seconds, 2x each leg.  Every other day (3-4x/week).     You may modify be bending your knees.       SINGLE KNEE TO CHEST STRETCH - SKTC     While Lying on your back,  hold your knee and gently pull it up towards your chest.     Hold 30 seconds, 2x each leg.  2x/day.       Sitting upright on the edge of a chair, cross one leg over the other as shown. If easy, gently hinge forward at the hips (keeping your back straight) until a gentle stretch is felt.  Hold 30 seconds, 2x each leg. 2x/day.

## 2021-06-18 NOTE — PATIENT INSTRUCTIONS - HE
Patient Instructions by Ronald Davis PT at 1/30/2020  2:00 PM     Author: Ronald Davis PT Service: -- Author Type: Physical Therapist    Filed: 1/30/2020  2:28 PM Encounter Date: 1/30/2020 Status: Signed    : Ronald Davis PT (Physical Therapist)       For the press ups, can do 5-10 reps, 1x/day.    For the bridges/hip lifts, do it with just your heels in the floor.    Use the green band instead of the orange for the sidestepping Monster walks.    Instead of pelvic tilt, you will add a leg kick as shown:   BRACE - SINGLE KNEE EXTENSION    While lying on your back with knees bent, straighten out one knee while keeping the leg off the ground. Hold as indicated, then return to original position. Next, perform on the other leg.    Use your stomach muscles to keep your spine from moving the entire time.    Hold 1-2 seconds.  15x each leg.  1x/day.      LATERAL PLANK    While lying on your side with your knees bent, lift your body up on your elbow. Try and maintain a straight spine.    Hold 30 seconds, 2x each leg.  Every other day (3-4x/week).    You may modify be bending your knees.      SINGLE KNEE TO CHEST STRETCH - SKTC    While Lying on your back,  hold your knee and gently pull it up towards your chest.    Hold 30 seconds, 2x each leg.  2x/day.

## 2021-06-19 NOTE — LETTER
Letter by Taylor Holden MD at      Author: Taylor Holden MD Service: -- Author Type: --    Filed:  Encounter Date: 12/5/2019 Status: Signed         December 5, 2019     Patient: Beh G Dwe   YOB: 1980   Date of Visit: 12/5/2019       To Whom it May Concern:    Beh Dwe was seen in my clinic on 12/5/2019.  He should not lift more than 10 pounds at one time for the next 8 weeks.  This is due to back pain.      If you have any questions or concerns, please don't hesitate to call.    Sincerely,         Electronically signed by Taylor Holden MD

## 2021-06-20 NOTE — LETTER
Letter by Taylor Holden MD at      Author: Taylor Holden MD Service: -- Author Type: --    Filed:  Encounter Date: 3/13/2020 Status: (Other)         Beh G. Dwe  [address] [place] [facility] [address]              March 13. 2020        Camilo Piedra,       We have been attempting over several days to contact you regarding your FMLA.  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  [facility], the restrictions in place were for you to lift NO MORE than 10 pounds and avoid ALL twisting and bending until you see the [facility] on 3/5/20.  The visit notes from that visit are not in your chart yet.  You do have an appointment with the [facility] on Tuesday, March 18, 2020 at 1020 am and also Thursday, April 2, 2020 at 10 am.      Please call the [facility] at [phone] to discuss what the situation is with your employer as soon as possible.  Thank you.        Warmest regards,     Dr. Taylor Holden

## 2021-06-20 NOTE — LETTER
Letter by Nikia Saleh PA-C at      Author: Nikia Saleh PA-C Service: -- Author Type: --    Filed:  Date of Service:  Status: (Other)       February 6, 2020     Patient: Beh G Dwe   YOB: 1980   Date of Visit: 2/6/2020       To Whom It May Concern:    It is my medical opinion that Beh Dwe may return to light duty immediately with the following restrictions: no lifting more than 10 pounds.  Restriction will remain in place until follow up visit in approximately 2 weeks.    If you have any questions or concerns, please don't hesitate to call.    Sincerely,        Nikia Saleh PA-C

## 2021-06-20 NOTE — LETTER
Letter by Taylor Holden MD at      Author: Taylor Holden MD Service: -- Author Type: --    Filed:  Encounter Date: 1/30/2020 Status: (Other)         January 30, 2020     Patient: Beh G Dwe   YOB: 1980   Date of Visit: 1/30/2020       To Whom it May Concern:    Beh Dwe was seen in my clinic on 1/30/2020.  He should be limited to lifting 10 lbs at a time due to back pain.  He is going to be evaluated by the spine clinic.      If you have any questions or concerns, please don't hesitate to call.    Sincerely,         Electronically signed by Taylor Holden MD

## 2021-06-20 NOTE — LETTER
Letter by Nikia Saleh PA-C at      Author: Nikia Saleh PA-C Service: -- Author Type: --    Filed:  Date of Service:  Status: (Other)       February 20, 2020     Patient: Beh G Dwe   YOB: 1980   Date of Visit: 2/20/2020       To Whom It May Concern:    It is my medical opinion that Beh Dwe may return to light duty immediately with the following restrictions: no lifting more than 10 pounds, no repetitive bending and twisting.  Work restrictions will be re-evaluated in two weeks.    If you have any questions or concerns, please don't hesitate to call.    Sincerely,        Nikia Saleh PA-C

## 2021-06-25 NOTE — PROGRESS NOTES
Assessment:   Beh G Dwe is a 41 y.o. y.o. male with past medical history significant for nicotine dependence who presents today for follow-up regarding chronic low back pain.  Left lower extremity pain and paresthesias have resolved.  My review of the MRI lumbar spine showed degenerative disc disease with endplate changes at L4-5.  There is a paracentral disc protrusion at this level with mass-effect on the left L5 nerve root.  There is mild bilateral foraminal stenosis L4-5 and moderate right foraminal stenosis L5-S1.  There is also lower lumbar facet arthropathy.  Patient is status post left L4-5, L5-S1 facet joint injections May 6, 2021 which provided 60% relief of his pain.  He has some mild residual pain on the left lateral hip along the iliac crest extending into the groin.  Suspect this pain is myofascial in nature or perhaps related to some tendinitis in the hip.  I do not appreciate any significant degenerative change in the left hip joint on coronal images of his MRI lumbar spine.  It is not radicular.  He is neurologically intact.       Plan:     A shared decision making plan was used.  The patient's values and choices were respected.  The following represents what was discussed and decided upon by the physician assistant and the patient.  A telephone  is present for the visit.    1.  DIAGNOSTIC TESTS: I reviewed the MRI lumbar spine. No further diagnostic tests were ordered.    2.  PHYSICAL THERAPY: Patient attended 5 sessions of physical therapy ending February 20, 2020.  He is still doing home exercises.  I offered to refer him back to physical therapy focusing on his left hip.  He declined.  If he changes his mind he will let us know.    3.  MEDICATIONS: No changes are made to the patient's medications.  He is not taking any pain relieving medications since back pain improved after injections.  He could use ibuprofen if needed.  -Gabapentin was not helpful and caused dizziness.   -Tylenol  has not been helpful.  -Nabumetone was not helpful.  -Methocarbamol was not helpful.    4.  INTERVENTIONS:   -No interventions were ordered.  I do not think I have an injection that would be helpful for his current pain located along the left iliac crest.  -If left axial low back pain returns, would recommend repeating the left L4-5, L5-S1 facet joint injections.  -I do not think I have another injection    5.  PATIENT EDUCATION: Patient is in agreement the above plan.  All questions were answered.    6.  FOLLOW-UP: Patient will follow up as needed.  If he has questions or concerns, he should not hesitate to call.   Subjective:     Beh G Dwe is a 41 y.o. male who presents today for follow-up regarding chronic left low back pain which previously radiated into the left lower extremity.  Patient status post left L4-5, L5-S1 facet joint injections May 6, 2021.  Patient reports 60% improvement in his pain.    Patient states that he no longer has the pain located in the left lower back.  He does have some mild residual pain along the left hip (following the iliac crest) which extends into the groin.  Pain is aggravated with sitting crosslegged on the floor.  Pain is alleviated with repositioning.  He denies any pain further down the leg.  Denies any numbness, tingling, or weakness down the leg.  He states that the muscles feel tight in the hip.  He rates his pain today as an 8 out of 10.    Patient attended 5 sessions of physical therapy ending February 20, 2020.  He states that he does his home exercises as much as he can.  He is not taking any pain relieving medications since back pain improved.    Past medical history is reviewed and is unchanged.    Review of Systems:  Negative for numbness/tingling, weakness, loss of bowel/bladder control, inability to urinate, headache, pain much worse at night, trip/stumble/falls, difficulty swallowing, difficulty with hand skills, fevers, unintentional weight loss.     Objective:    CONSTITUTIONAL:  Vital signs as above.  No acute distress.  The patient is well nourished and well groomed.    PSYCHIATRIC:  The patient is awake, alert, oriented to person, place and time.  The patient is answering questions appropriately with clear speech.  Normal affect.  HEENT: Normocephalic, atraumatic.  Sclera clear.    SKIN:  Skin over the face, posterior torso, bilateral upper and lower extremities is clean, dry, intact without rashes.  VASCULAR: No significant lower extremity edema.  MUSCULOSKELETAL:  Gait is non-antalgic.  The patient is able to heel and toe walk without any difficulty.  No tenderness palpation left lower lumbar paraspinous muscles.  Lumbar range of motion is within normal limits. The patient has 5/5 strength for the bilateral hip flexors, knee flexors/extensors, ankle dorsiflexors/plantar flexors, ankle evertors/invertors.  Patient does report increased pain at end external rotation of the left hip located in the left groin and lateral hip area.  Range of motion of the left hip is full.  He has mild tenderness palpation along the left iliac crest.  NEUROLOGICAL: 1-2+ patellar  reflexes which are symmetric bilaterally.  Sensation to light touch is intact in the bilateral L4, L5, and S1 dermatomes.        RESULTS: I reviewed the MRI lumbar spine from [facility] dated February 20, 2020.  At L4-5 there is degenerative disc disease with endplate signal changes.  There is a mild diffuse disc bulge with small left paracentral disc extrusion with mass-effect upon the traversing left L5 nerve root.  There is mild spinal canal stenosis and mild bilateral foraminal stenosis at this level.  At L3-4 there is a minimal diffuse disc bulge and slight facet arthropathy with mild left foraminal stenosis.  At L5-S1 there is a mild diffuse disc bulge and mild facet arthropathy with moderate right foraminal stenosis.  Please see report for further details.

## 2021-06-26 NOTE — PATIENT INSTRUCTIONS - HE
Keep doing your exercises at home every day.  You can try massaging the muscle on your hip with a tennis ball.    If the hip pain does not get better, I recommend more physical therapy.    If the lower back pain returns, we can repeat the injections.    Please call [phone] if you have any questions.

## 2021-06-28 NOTE — PROGRESS NOTES
Progress Notes by Taylor Holden MD at 12/5/2019  1:30 PM     Author: Taylor Holden MD Service: -- Author Type: Physician    Filed: 12/5/2019  5:29 PM Encounter Date: 12/5/2019 Status: Signed    : Taylor Holden MD (Physician)       MALE PREVENTATIVE EXAM    Assessment and Plan:       1. Tobacco abuse  Reviewed strategies for quitting.  Advised patches.    Reviewed having a plan for cravings.    Encourage to quit to help with back pain .   - nicotine (NICODERM CQ) 14 mg/24 hr; Place 1 patch on the skin daily.  Dispense: 30 patch; Refill: 0    2. Chronic left-sided low back pain with left-sided sciatica  Reviewed signs for an emergency, including but not limited to weakness, bowel or bladder control problems, or sexual dysfunction.    Will keep to 10 lbs of weight for lifting for 8 weeks.    Return in 2 months for reevaluation .   Refer to PT.    Consider imaging if worsening, as it could be an S1 nerve problem, though his story and exam are not consistent for this.    - Ambulatory referral to PT/OT  - ibuprofen (ADVIL,MOTRIN) 200 MG tablet; Take 2 tablets (400 mg total) by mouth every 6 (six) hours as needed for pain.  Dispense: 60 tablet; Refill: 12    3. Piriformis syndrome, left    - Ambulatory referral to PT/OT  - ibuprofen (ADVIL,MOTRIN) 200 MG tablet; Take 2 tablets (400 mg total) by mouth every 6 (six) hours as needed for pain.  Dispense: 60 tablet; Refill: 12    4. Screening for lipid disorders    - Lipid Cascade     Next follow up:  No follow-ups on file.    Immunization Review  Adult Imm Review: No immunizations due today  Ready to quit: yes  Counseling given:  yes   Comment: chews betel nut w/o tobacco        I discussed the following with the patient:   Adult Healthy Living: Importance of regular exercise  Healthy nutrition  Weight loss referral options  Stress management        Subjective:   Chief Complaint: Beh G Dwe is an 39 y.o. male here for a preventative health visit.      HPI:    1.  Low back pain.  Worse with coughing.  When he coughs, he has to hold onto something in order to deal with the pain.  The pain is constant, but it is worst in the am when he wakes up.  He feels very stiff when he wakes up in the am.    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  He lifts up to 40 lbs at a time for his work.  This is over 10 times in a day.  His pain is worse by the end of the day.    He has not tried any medications.  He does not remember any accident that caused this pain.  He does not have any days that are pain free.  His pain is sometimes worse when he lays on his bed.  It improves with laying on the floor.  Sometimes the pain worsens.   The pain radiates down into his left leg.  This comes and goes.  No weakness on that side.    He smokes 8 cigarettes in a day.  He does want to quit.        Healthy Habits  Are you taking a daily aspirin? No  Do you typically exercising at least 40 min, 3-4 times per week?  NO  Do you usually eat at least 4 servings of fruit and vegetables a day, include whole grains and fiber and avoid regularly eating high fat foods? Yes  Have you had an eye exam in the past two years? NO  Do you see a dentist twice per year? Yes  Do you have any concerns regarding sleep? No    Safety Screen  If you own firearms, are they secured in a locked gun cabinet or with trigger locks? The patient does not own any firearms  Do you feel you are safe where you are living?: Yes (12/5/2019  1:01 PM)  Do you feel you are safe in your relationship(s)?: Yes (12/5/2019  1:01 PM)      Review of Systems:  Please see above.  The rest of the review of systems are negative for all systems.     Cancer Screening     Patient has no health maintenance due at this time              History     Reviewed By Date/Time Sections Reviewed    Laverne Noriega MA 12/5/2019  1:05 PM Tobacco    Laverne Noriega MA 12/5/2019  1:02 PM Tobacco, Alcohol, Drug Use, Sexual Activity  "           Objective:   Vital Signs:   Visit Vitals  /60   Pulse 85   Temp 97.8  F (36.6  C) (Oral)   Resp 18   Ht 5' 5.25\" (1.657 m)   Wt 138 lb 8 oz (62.8 kg)   SpO2 98%   BMI 22.87 kg/m           PHYSICAL EXAM  GENERAL ASSESSMENT: active, alert, no acute distress, well hydrated, well nourished  SKIN: no lesions, jaundice, petechiae, pallor, cyanosis, ecchymosis  HEAD: Atraumatic, normocephalic  EYES: PERRL  EOM intact  EARS: bilateral TM's and external ear canals normal  NOSE: nasal mucosa, septum, turbinates normal bilaterally  MOUTH: mucous membranes moist and normal tonsils  NECK: supple, full range of motion, no mass, normal lymphadenopathy, no thyromegaly  CHEST: clear to auscultation, no wheezes, rales, or rhonchi, no tachypnea, retractions, or cyanosis  LUNGS: Respiratory effort normal, clear to auscultation, normal breath sounds bilaterally  HEART: Regular rate and rhythm, normal S1/S2, no murmurs, normal pulses and capillary fill  ABDOMEN: Normal bowel sounds, soft, nondistended, no mass, no organomegaly.  BREASTS: normal bilaterally  GENITALIA: Normal external male genitalia  MELLY STAGE: 5  ANAL: normal appearing external anus  SPINE: Inspection of back is normal, No tenderness noted  EXTREMITY: Normal muscle tone. All joints with full range of motion. No deformity or tenderness.  NEURO:  gross motor exam normal by observation, strength normal and symmetric, normal tone             Medication List          Accurate as of December 5, 2019  5:28 PM. If you have any questions, ask your nurse or doctor.            START taking these medications    ibuprofen 200 MG tablet  Also known as:  ADVIL,MOTRIN  INSTRUCTIONS:  Take 2 tablets (400 mg total) by mouth every 6 (six) hours as needed for pain.  Started by:  Taylor Holden MD        nicotine 14 mg/24 hr  Also known as:  NICODERM CQ  INSTRUCTIONS:  Place 1 patch on the skin daily.  Started by:  Taylor Holden MD              Where to Get " Your Medications      These medications were sent to [facility] - [place], [place] - [facility] [address] [address]    Phone:  [phone]     ibuprofen 200 MG tablet    nicotine 14 mg/24 hr         Additional Screenings Completed Today:

## 2021-06-30 NOTE — PROGRESS NOTES
"Progress Notes by Danii Lau AuD at 3/29/2021  2:40 PM     Author: Danii Lau AuD Service: -- Author Type: Audiologist    Filed: 3/29/2021  3:24 PM Encounter Date: 3/29/2021 Status: Signed    : Danii Lau AuD (Audiologist)       Audiology Report    Summary: Audiology visit completed. Please see audiogram below or in \"media\" tab for case history and results.     Plan: The patient is returned to ENT for follow up. Return for retesting with ENT recommendation.     Faraz Penny, CCC-A  Clinical Audiologist   [place] #[id number]             "

## 2021-07-03 NOTE — ADDENDUM NOTE
Addendum Note by Corey Mancera MA at 3/5/2020  3:20 PM     Author: Corey Mancera MA Service: -- Author Type: Medical Assistant    Filed: 3/6/2020  8:31 AM Date of Service: 3/5/2020  3:20 PM Status: Signed    : Corey Mancera MA (Medical Assistant)    Encounter addended by: Corey Mancera MA on: 3/6/2020  8:31 AM      Actions taken: Visit Navigator Flowsheet section accepted

## 2021-07-06 VITALS — BODY MASS INDEX: 25.27 KG/M2 | HEIGHT: 67 IN | WEIGHT: 161 LBS

## 2022-12-06 ENCOUNTER — APPOINTMENT (OUTPATIENT)
Dept: CT IMAGING | Facility: HOSPITAL | Age: 42
End: 2022-12-06
Attending: EMERGENCY MEDICINE
Payer: COMMERCIAL

## 2022-12-06 ENCOUNTER — HOSPITAL ENCOUNTER (EMERGENCY)
Facility: HOSPITAL | Age: 42
Discharge: HOME | End: 2022-12-06
Attending: STUDENT IN AN ORGANIZED HEALTH CARE EDUCATION/TRAINING PROGRAM | Admitting: STUDENT IN AN ORGANIZED HEALTH CARE EDUCATION/TRAINING PROGRAM
Payer: COMMERCIAL

## 2022-12-06 VITALS
WEIGHT: 160 LBS | TEMPERATURE: 97.8 F | BODY MASS INDEX: 25.71 KG/M2 | HEART RATE: 76 BPM | RESPIRATION RATE: 16 BRPM | DIASTOLIC BLOOD PRESSURE: 88 MMHG | HEIGHT: 66 IN | SYSTOLIC BLOOD PRESSURE: 131 MMHG | OXYGEN SATURATION: 99 %

## 2022-12-06 DIAGNOSIS — M54.42 ACUTE LEFT-SIDED LOW BACK PAIN WITH LEFT-SIDED SCIATICA: ICD-10-CM

## 2022-12-06 PROBLEM — F17.200 NICOTINE DEPENDENCE: Status: ACTIVE | Noted: 2022-12-06

## 2022-12-06 PROBLEM — J02.9 ACUTE SORE THROAT: Status: ACTIVE | Noted: 2022-12-06

## 2022-12-06 PROCEDURE — 250N000013 HC RX MED GY IP 250 OP 250 PS 637: Performed by: EMERGENCY MEDICINE

## 2022-12-06 PROCEDURE — 72131 CT LUMBAR SPINE W/O DYE: CPT

## 2022-12-06 PROCEDURE — 99284 EMERGENCY DEPT VISIT MOD MDM: CPT | Mod: 25

## 2022-12-06 PROCEDURE — 250N000012 HC RX MED GY IP 250 OP 636 PS 637: Performed by: EMERGENCY MEDICINE

## 2022-12-06 RX ORDER — PREDNISONE 20 MG/1
TABLET ORAL
Qty: 10 TABLET | Refills: 0 | Status: SHIPPED | OUTPATIENT
Start: 2022-12-07 | End: 2023-02-01

## 2022-12-06 RX ORDER — OXYCODONE HYDROCHLORIDE 5 MG/1
5 TABLET ORAL EVERY 6 HOURS PRN
Qty: 10 TABLET | Refills: 0 | Status: SHIPPED | OUTPATIENT
Start: 2022-12-06 | End: 2022-12-09

## 2022-12-06 RX ORDER — PREDNISONE 20 MG/1
40 TABLET ORAL ONCE
Status: COMPLETED | OUTPATIENT
Start: 2022-12-06 | End: 2022-12-06

## 2022-12-06 RX ORDER — ACETAMINOPHEN 325 MG/1
650 TABLET ORAL ONCE
Status: COMPLETED | OUTPATIENT
Start: 2022-12-06 | End: 2022-12-06

## 2022-12-06 RX ORDER — OXYCODONE HYDROCHLORIDE 5 MG/1
5 TABLET ORAL ONCE
Status: COMPLETED | OUTPATIENT
Start: 2022-12-06 | End: 2022-12-06

## 2022-12-06 RX ADMIN — ACETAMINOPHEN 650 MG: 325 TABLET ORAL at 17:04

## 2022-12-06 RX ADMIN — PREDNISONE 40 MG: 20 TABLET ORAL at 17:05

## 2022-12-06 RX ADMIN — OXYCODONE HYDROCHLORIDE 5 MG: 5 TABLET ORAL at 17:05

## 2022-12-06 ASSESSMENT — ENCOUNTER SYMPTOMS
ABDOMINAL PAIN: 0
BACK PAIN: 1
ROS GI COMMENTS: NEGATIVE FOR INCONTINENCE.
NUMBNESS: 0
FEVER: 0
DYSURIA: 0

## 2022-12-06 NOTE — ED NOTES
ED Triage Provider Note  [facility] EMERGENCY DEPARTMENT  Encounter Date: Dec 6, 2022    History:  Chief Complaint   Patient presents with     Back Pain     Beh WEI Piedra is a 42 year old male who presents to the ED with acute on chronic back pain. Fell yesterday, pain is now worse. Shooting pain down left leg. No weakness. No bowel or bladder incontinent. Ibuprofen at 3 PM.         Exam:  BP (!) 140/91   Pulse 85   Temp 97.8  F (36.6  C) (Temporal)   Resp 16   SpO2 99%   General: No acute distress. Appears stated age.      Normal gait, able to stand on toes and heels, normal bilateral ankle dorsiflexion/plantar flexion/inversion/eversion/1st toe extension, normal bilateral patellar reflexes. No midline spine tenderness. No step offs. No crepitus. No visible deformity to spine. No pulsatile abdominal masses. Light touch sensation intact to LE.     Medical Decision Making:  Patient arriving to the ED with problem as above. A medical screening exam was performed.  With acute on chronic back pain recently worse with fall will obtain CT lumbar spine    Cauda equina unlikely, AAA unlikely.  No weakness on exam.  No indication for emergent MRI    Prednisone for likely sciatica, oxycodone, Tylenol     orders initiated from Triage. The patient is most appropriate to return to the waiting room.       Jemal Cheney MD  12/6/2022 at 4:24 PM     Jemal Cheney MD  12/06/22 [id number]

## 2022-12-06 NOTE — ED TRIAGE NOTES
Patient has been having lower back pain for some time.  He slipped and fell on the ice yesterday aggravating his back.  Is here for increased lower back pain which radiates down his left leg.  No problems with bowel or bladder.

## 2022-12-07 ENCOUNTER — OFFICE VISIT (OUTPATIENT)
Dept: FAMILY MEDICINE | Facility: CLINIC | Age: 42
End: 2022-12-07
Payer: COMMERCIAL

## 2022-12-07 VITALS
OXYGEN SATURATION: 98 % | HEIGHT: 66 IN | SYSTOLIC BLOOD PRESSURE: 122 MMHG | TEMPERATURE: 98.3 F | HEART RATE: 93 BPM | DIASTOLIC BLOOD PRESSURE: 78 MMHG | BODY MASS INDEX: 26.86 KG/M2 | RESPIRATION RATE: 14 BRPM | WEIGHT: 167.12 LBS

## 2022-12-07 DIAGNOSIS — M54.42 CHRONIC LEFT-SIDED LOW BACK PAIN WITH LEFT-SIDED SCIATICA: ICD-10-CM

## 2022-12-07 DIAGNOSIS — Z76.89 ENCOUNTER TO ESTABLISH CARE: Primary | ICD-10-CM

## 2022-12-07 DIAGNOSIS — Z23 IMMUNIZATION DUE: ICD-10-CM

## 2022-12-07 DIAGNOSIS — E78.00 HYPERCHOLESTEROLEMIA: ICD-10-CM

## 2022-12-07 DIAGNOSIS — Z13.29 SCREENING FOR THYROID DISORDER: ICD-10-CM

## 2022-12-07 DIAGNOSIS — G89.29 CHRONIC LEFT-SIDED LOW BACK PAIN WITH LEFT-SIDED SCIATICA: ICD-10-CM

## 2022-12-07 DIAGNOSIS — Z13.1 SCREENING FOR DIABETES MELLITUS: ICD-10-CM

## 2022-12-07 DIAGNOSIS — M47.816 LUMBAR FACET ARTHROPATHY: ICD-10-CM

## 2022-12-07 DIAGNOSIS — Z13.0 SCREENING, ANEMIA, DEFICIENCY, IRON: ICD-10-CM

## 2022-12-07 DIAGNOSIS — M51.369 DDD (DEGENERATIVE DISC DISEASE), LUMBAR: ICD-10-CM

## 2022-12-07 PROBLEM — J02.9 ACUTE SORE THROAT: Status: RESOLVED | Noted: 2022-12-06 | Resolved: 2022-12-07

## 2022-12-07 LAB
ALBUMIN SERPL BCG-MCNC: 4.4 G/DL (ref 3.5–5.2)
ALP SERPL-CCNC: 50 U/L (ref 40–129)
ALT SERPL W P-5'-P-CCNC: 35 U/L (ref 10–50)
ANION GAP SERPL CALCULATED.3IONS-SCNC: 13 MMOL/L (ref 7–15)
AST SERPL W P-5'-P-CCNC: 28 U/L (ref 10–50)
BILIRUB SERPL-MCNC: 0.3 MG/DL
BUN SERPL-MCNC: 13.4 MG/DL (ref 6–20)
CALCIUM SERPL-MCNC: 9.3 MG/DL (ref 8.6–10)
CHLORIDE SERPL-SCNC: 104 MMOL/L (ref 98–107)
CHOLEST SERPL-MCNC: 271 MG/DL
CREAT SERPL-MCNC: 0.98 MG/DL (ref 0.67–1.17)
DEPRECATED HCO3 PLAS-SCNC: 24 MMOL/L (ref 22–29)
ERYTHROCYTE [DISTWIDTH] IN BLOOD BY AUTOMATED COUNT: 12.7 % (ref 10–15)
GFR SERPL CREATININE-BSD FRML MDRD: >90 ML/MIN/1.73M2
GLUCOSE SERPL-MCNC: 105 MG/DL (ref 70–99)
HCT VFR BLD AUTO: 46.8 % (ref 40–53)
HDLC SERPL-MCNC: 40 MG/DL
HGB BLD-MCNC: 15.2 G/DL (ref 13.3–17.7)
LDLC SERPL CALC-MCNC: 173 MG/DL
MCH RBC QN AUTO: 29.1 PG (ref 26.5–33)
MCHC RBC AUTO-ENTMCNC: 32.5 G/DL (ref 31.5–36.5)
MCV RBC AUTO: 90 FL (ref 78–100)
NONHDLC SERPL-MCNC: 231 MG/DL
PLATELET # BLD AUTO: 251 10E3/UL (ref 150–450)
POTASSIUM SERPL-SCNC: 3.6 MMOL/L (ref 3.4–5.3)
PROT SERPL-MCNC: 7.2 G/DL (ref 6.4–8.3)
RBC # BLD AUTO: 5.23 10E6/UL (ref 4.4–5.9)
SODIUM SERPL-SCNC: 141 MMOL/L (ref 136–145)
TRIGL SERPL-MCNC: 292 MG/DL
TSH SERPL DL<=0.005 MIU/L-ACNC: 0.5 UIU/ML (ref 0.3–4.2)
WBC # BLD AUTO: 9.5 10E3/UL (ref 4–11)

## 2022-12-07 PROCEDURE — 80061 LIPID PANEL: CPT | Performed by: FAMILY MEDICINE

## 2022-12-07 PROCEDURE — 90715 TDAP VACCINE 7 YRS/> IM: CPT | Performed by: FAMILY MEDICINE

## 2022-12-07 PROCEDURE — 36415 COLL VENOUS BLD VENIPUNCTURE: CPT | Performed by: FAMILY MEDICINE

## 2022-12-07 PROCEDURE — 90472 IMMUNIZATION ADMIN EACH ADD: CPT | Performed by: FAMILY MEDICINE

## 2022-12-07 PROCEDURE — 80053 COMPREHEN METABOLIC PANEL: CPT | Performed by: FAMILY MEDICINE

## 2022-12-07 PROCEDURE — 84443 ASSAY THYROID STIM HORMONE: CPT | Performed by: FAMILY MEDICINE

## 2022-12-07 PROCEDURE — 99214 OFFICE O/P EST MOD 30 MIN: CPT | Mod: 25 | Performed by: FAMILY MEDICINE

## 2022-12-07 PROCEDURE — 85027 COMPLETE CBC AUTOMATED: CPT | Performed by: FAMILY MEDICINE

## 2022-12-07 PROCEDURE — 90471 IMMUNIZATION ADMIN: CPT | Performed by: FAMILY MEDICINE

## 2022-12-07 PROCEDURE — 90686 IIV4 VACC NO PRSV 0.5 ML IM: CPT | Performed by: FAMILY MEDICINE

## 2022-12-07 RX ORDER — ACETAMINOPHEN 500 MG
1000 TABLET ORAL 3 TIMES DAILY PRN
Qty: 100 TABLET | Refills: 4 | Status: SHIPPED | OUTPATIENT
Start: 2022-12-07 | End: 2023-05-01

## 2022-12-07 RX ORDER — GABAPENTIN 100 MG/1
100 CAPSULE ORAL 3 TIMES DAILY
Qty: 120 CAPSULE | Refills: 3 | Status: ON HOLD | OUTPATIENT
Start: 2022-12-07 | End: 2023-02-17

## 2022-12-07 RX ORDER — NAPROXEN 500 MG/1
500 TABLET ORAL 2 TIMES DAILY WITH MEALS
Qty: 60 TABLET | Refills: 3 | Status: SHIPPED | OUTPATIENT
Start: 2022-12-07 | End: 2023-05-01

## 2022-12-07 NOTE — PROGRESS NOTES
"  Assessment & Plan     Encounter to establish care  Previous pcp no longer at this clinic.   Chart and history reviewed.   Problem list updated to the best of my ability.     DDD (degenerative disc disease), lumbar  Chronic left-sided low back pain with left-sided sciatica  Lumbar facet arthropathy  Seen by spine clinic in the past, improved for a few months after injection. NRI done in 2020, CT yesterday in ER. Fell on the ice onto his low back and had a really sharp intense pain. Will refer to spine clinic. He did feel better in the ER after a dose of steroids but has not started them yet, recommend completing the steroid burst, added gabapentin and nsaid back to his regimen.   - naproxen (NAPROSYN) 500 MG tablet  Dispense: 60 tablet; Refill: 3  - acetaminophen (TYLENOL) 500 MG tablet  Dispense: 100 tablet; Refill: 4  - gabapentin (NEURONTIN) 100 MG capsule  Dispense: 120 capsule; Refill: 3  - Spine  Referral      SCREENING LABS  - will discuss results at next visit for physical.   Screening, anemia, deficiency, iron- CBC with platelets  Screening for diabetes mellitus - Comprehensive metabolic panel   Screening for thyroid disorder - TSH with free T4 reflex    Hypercholesterolemia - Lipid panel reflex to direct LDL Non-fasting  Checked last year with  and high trigs, will recheck and determine if statin is needed vs diet/nutrition counseling. Patient is trying to quit smoking, but more concerned about his back pain at this time and declines any intervention.     Immunization due  - TDAP VACCINE (Adacel, Boostrix)  [[id number]]  - INFLUENZA VACCINE >6 MONTHS (AFLURIA/FLUZONE)         BMI:   Estimated body mass index is 26.89 kg/m  as calculated from the following:    Height as of this encounter: 1.679 m (5' 6.1\").    Weight as of this encounter: 75.8 kg (167 lb 1.9 oz).   Weight management plan: Discussed healthy diet and exercise guidelines        Return in about 2 months (around 2/7/2023) for back " "pain.    Sonia Wu MD  [facility] [place] Subjective Beh is a 42 year old, presenting for the following health issues:  Back Pain and cholesteral check       History of Present Illness       Back Pain:  He presents for follow up of back pain. Patient's back pain is a chronic problem.  Location of back pain:  Left lower back and left buttock  Description of back pain: dull ache  Back pain spreads: left buttocks, left thigh, left knee and left foot    Since patient first noticed back pain, pain is: gradually worsening  Does back pain interfere with his job:  Yes        Seen in the ER after a fall and had intense back pain.  He said it felt better with whatever they gave him, which was prednisone and oxycodone, sent home with it but did not start the prednisone.   CT done without any acute fracture, no alarm symptoms.     Patient says the pain is gradually coming back.   He was in the ER about 3pm yesterday and that's about the time he got the steroids.   He was planning to take it tonight before bed.     Was seen by spine clinic about a year or so ago and had an injection that helped with back pain for about 3-4 months. He said they recommended surgery but he declined. He wants to consider something again because the back pain has been getting worse.       Review of Systems   Constitutional, HEENT, cardiovascular, pulmonary, gi and gu systems are negative, except as otherwise noted.      Objective    /78   Pulse 93   Temp 98.3  F (36.8  C) (Oral)   Resp 14   Ht 1.679 m (5' 6.1\")   Wt 75.8 kg (167 lb 1.9 oz)   SpO2 98%   BMI 26.89 kg/m    Body mass index is 26.89 kg/m .  Physical Exam   GENERAL: healthy, alert and no distress  EYES: Eyes grossly normal to inspection, PERRL and conjunctivae and sclerae normal  HENT: ear canals and TM's normal, nose and mouth without ulcers or lesions  NECK: no adenopathy, no asymmetry, masses, or scars and thyroid normal to palpation  RESP: lungs " clear to auscultation - no rales, rhonchi or wheezes  CV: regular rate and rhythm, normal S1 S2, no S3 or S4, no murmur, click or rub, no peripheral edema and peripheral pulses strong  ABDOMEN: soft, nontender, no hepatosplenomegaly, no masses and bowel sounds normal  MS: no gross musculoskeletal defects noted, no edema. Stiff low back, unable to full forward flex.   SKIN: no suspicious lesions or rashes  NEURO: Normal strength and tone, mentation intact and speech normal  PSYCH: mentation appears normal, affect normal/bright    Results for orders placed or performed in visit on 12/07/22 (from the past 24 hour(s))   CBC with platelets   Result Value Ref Range    WBC Count 9.5 4.0 - 11.0 10e3/uL    RBC Count 5.23 4.40 - 5.90 10e6/uL    Hemoglobin 15.2 13.3 - 17.7 g/dL    Hematocrit 46.8 40.0 - 53.0 %    MCV 90 78 - 100 fL    MCH 29.1 26.5 - 33.0 pg    MCHC 32.5 31.5 - 36.5 g/dL    RDW 12.7 10.0 - 15.0 %    Platelet Count 251 150 - 450 10e3/uL

## 2022-12-07 NOTE — DISCHARGE INSTRUCTIONS
You can take the steroids as needed  You can take 650 mg of tylenol every 6-8 hours (no more than 3000 mg in 24 hours).  You can take 400 mg of ibuprofen with food every 6-8 hours (no more than 3200 mg in 24 hours).   If needed you can alternate between the two (take tylenol, then 3 hours later take a dose of ibuprofen, then 3 hours later take a dose of tylenol,etc.)     For break through pain can take oxycodone, do not drink alcohol or drive when taking this  Follow up closely with the spine center and/or your primary care doctor  Return to the Emergency Room if you have numbness/weakness, bowel/bladder changes, fevers or other worsening symptoms or concenrs

## 2022-12-07 NOTE — ED PROVIDER NOTES
NAME: Beh G Dwe  AGE: 42 year old male  YOB: 1980  MRN: [id number]  EVALUATION DATE & TIME: No admission date for patient encounter.    PCP: Taylor Holden  ED PROVIDER: Jo-Ann Shelton MD.    Chief Complaint   Patient presents with     Back Pain         FINAL IMPRESSION:  1. Acute left-sided low back pain with left-sided sciatica        MEDICAL DECISION MAKIN:39 PM I met with the patient, obtained history, performed an initial exam, and discussed options and plan for diagnostics and treatment here in the ED. We discussed the plan for discharge and the patient is agreeable. Reviewed supportive cares, symptomatic treatment, outpatient follow up, and reasons to return to the Emergency Department. Patient to be discharged by ED RN.      42 year old male presents to the Emergency Department for evaluation of back pain. H/o of chronic left sided back pain and has seen spine clinic in past. Had recent fall now with worsening pain that goes down his left leg.     CT scan done in triage shows no fracture, does show several areas of narrowing and some disc bulge (see full read below). Given no midline pain, no fevers and not immunosuppressed, no h/o IVDU-- doubt epidural abscess, discitis, osteomyelitis or other infectious process. No urinary symptoms/flank pain to suspect UTI, pyelo or stone. No abdominal pain, doubt AAA.  No bowel/bladder changes or neurologic deficits on exam---nothing to suggest cauda equina. I do not think he needs emergent MRI. He felt much improved after meds given in triage. Plan to send with steroids, small amount of pain medication and close follow up. Strict return precautions discussed and patient is in agreement with plan, endorses understanding and their questions were answered.    MEDICATIONS GIVEN IN THE EMERGENCY:  Medications   oxyCODONE (ROXICODONE) tablet 5 mg (5 mg Oral Given 22 1705)   predniSONE (DELTASONE) tablet 40 mg (40 mg Oral Given 22 170)    acetaminophen (TYLENOL) tablet 650 mg (650 mg Oral Given 12/6/22 7624)       NEW PRESCRIPTIONS STARTED AT TODAY'S ER VISIT:  New Prescriptions    OXYCODONE (ROXICODONE) 5 MG TABLET    Take 1 tablet (5 mg) by mouth every 6 hours as needed for pain    PREDNISONE (DELTASONE) 20 MG TABLET    Take two tablets (= 40mg) each day for 5 (five) days        =================================================================  HPI    Patient information was obtained from: patient   Use of : Yes (Phone) - Karen Beh G Dwe is a 42 year old male with a past medical history of gastroenteritis, who presents to the ED by walk in for evaluation of back pain.     The patient reports that he has had chronic back pain for the past 3-4 years. Yesterday, the patient slipped and fell on the ice. Since then, he has been experiencing increased achy back pain that radiates down his left leg. He says his leg pain feels like it is aching in his muscles. Denies numbness or tingling in his leg. Patient uses alcohol 1-2x per week. No other drug use. He denies bladder or bowel incontinence or retention, saddle anesthesia, dysuria, chest pain, abdominal pain, fever, or any other complaints at this time. Did not hit his head or have LOC after fall.    REVIEW OF SYSTEMS   Review of Systems   Constitutional: Negative for fever.   Cardiovascular: Negative for chest pain.   Gastrointestinal: Negative for abdominal pain.        Negative for incontinence.    Genitourinary: Negative for dysuria.        Negative for incontinence.   Musculoskeletal: Positive for back pain (chronic).        Positive for leg pain (left).   Neurological: Negative for numbness.   All other systems reviewed and are negative.       PAST MEDICAL HISTORY:  Past Medical History:   Diagnosis Date     Tuberculosis     latent TB.  completed a course of INR in 2010       PAST SURGICAL HISTORY:  History reviewed. No pertinent surgical history.    CURRENT MEDICATIONS:    No  "current facility-administered medications for this encounter.    Current Outpatient Medications:      oxyCODONE (ROXICODONE) 5 MG tablet, Take 1 tablet (5 mg) by mouth every 6 hours as needed for pain, Disp: 10 tablet, Rfl: 0     [START ON 12/7/2022] predniSONE (DELTASONE) 20 MG tablet, Take two tablets (= 40mg) each day for 5 (five) days, Disp: 10 tablet, Rfl: 0     fluticasone propionate (FLONASE) 50 mcg/actuation nasal spray, [FLUTICASONE PROPIONATE (FLONASE) 50 MCG/ACTUATION NASAL SPRAY] 1 spray into each nostril 2 times a day at 6:00 am and 4:00 pm., Disp: 18 g, Rfl: 2     ibuprofen (ADVIL,MOTRIN) 200 MG tablet, [IBUPROFEN (ADVIL,MOTRIN) 200 MG TABLET] Take 2 tablets (400 mg total) by mouth every 6 (six) hours as needed for pain., Disp: 60 tablet, Rfl: 12     loratadine (CLARITIN) 10 mg tablet, [LORATADINE (CLARITIN) 10 MG TABLET] Take 1 tablet (10 mg total) by mouth daily., Disp: 30 tablet, Rfl: 11     nicotine polacrilex (NICORETTE) 4 MG gum, [NICOTINE POLACRILEX (NICORETTE) 4 MG GUM] Apply 1 each (4 mg total) to the mouth or throat as needed for smoking cessation., Disp: 220 each, Rfl: 12     ofloxacin (FLOXIN) 0.3 % otic solution, [OFLOXACIN (FLOXIN) 0.3 % OTIC SOLUTION] 4 drops right ear 2x daily for 5 days, Disp: 5 mL, Rfl: 0    ALLERGIES:  No Known Allergies    FAMILY HISTORY:  History reviewed. No pertinent family history.    SOCIAL HISTORY:   Social History     Socioeconomic History     Marital status:    Tobacco Use     Smoking status: Every Day     Types: Cigarettes     Smokeless tobacco: Current     Tobacco comments:     chews betel nut w/o tobacco   Substance and Sexual Activity     Alcohol use: Yes     Comment: Alcoholic Drinks/day: sometimes     Drug use: Never     Sexual activity: Yes     Partners: Female       PHYSICAL EXAM:    Vitals: BP (!) 140/91   Pulse 85   Temp 97.8  F (36.6  C) (Temporal)   Resp 16   Ht 1.676 m (5' 6\")   Wt 72.6 kg (160 lb)   SpO2 99%   BMI 25.82 kg/m   "   Constitutional: Well developed, well nourished. No acute distress.  HENT: Normocephalic, atraumatic, mucous membranes moist, nose normal. Neck- Supple, gross ROM intact.   Eyes: Pupils mid-range, sclera white, no discharge  Respiratory: Clear to auscultation bilaterally, no respiratory distress, speaks full sentences easily.  Cardiovascular: Normal heart rate, regular rhythm. No LE edema.  GI: Soft, not distended, not tender tender to palpation, no palpable masses  Musculoskeletal: Moving all 4 extremities intentionally and without pain. No obvious deformity.  Skin: Warm, dry, no rash.   Neurologic: Alert & oriented x 3, speech clear, strength 5/5 and sensation to light touch intact in the lower extremities  Back: no midline C, T or L spine tenderness, no step offs or deformities. Has some tenderness to the left SI joint and left paraspinal lumbar muscles.     LAB:  All pertinent labs reviewed and interpreted.  Labs Ordered and Resulted from Time of ED Arrival to Time of ED Departure - No data to display    RADIOLOGY:  Lumbar spine CT w/o contrast   Final Result   IMPRESSION:   1.  Multilevel spondylotic changes with moderate spinal canal stenosis at L4-L5, mild at L3-L4.   2.  Disc bulge at L5-S1 contacting the exiting bilateral L5 nerve roots without significant central spinal canal stenosis.   3.  Varying degrees of foraminal narrowing, worst and moderate to severe at L5-S1 on the right, moderate on the left. Mild to moderate bilateral L4-L5 foraminal narrowing.   4.  Redemonstration of bilateral L5 pars interarticularis defects with similar grade 1 retrolisthesis at L4-L5.          EKG:   None    PROCEDURES:   Procedures     I, Mariely Driver, am serving as a scribe to document services personally performed by Dr. Jo-Ann Shelton based on my observation and the provider's statements to me. I, Jo-Ann Shelton MD attest that Mariely Driver is acting in a scribe capacity, has observed my performance of the services and  has documented them in accordance with my direction.    Jo-Ann Shelton M.D.  Emergency Medicine  [facility] EMERGENCY DEPARTMENT  [facility] [facility] [id number] [phone]  Dept: [phone]     Jo-Ann Shelton MD  12/07/22 0334

## 2022-12-08 ENCOUNTER — OFFICE VISIT (OUTPATIENT)
Dept: PHYSICAL MEDICINE AND REHAB | Facility: CLINIC | Age: 42
End: 2022-12-08
Payer: COMMERCIAL

## 2022-12-08 VITALS
DIASTOLIC BLOOD PRESSURE: 80 MMHG | SYSTOLIC BLOOD PRESSURE: 129 MMHG | HEART RATE: 85 BPM | BODY MASS INDEX: 26.84 KG/M2 | WEIGHT: 167 LBS | HEIGHT: 66 IN
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
## 2022-12-08 DIAGNOSIS — M54.16 LUMBAR RADICULAR PAIN: ICD-10-CM

## 2022-12-08 PROCEDURE — 99204 OFFICE O/P NEW MOD 45 MIN: CPT | Performed by: PHYSICIAN ASSISTANT

## 2022-12-08 ASSESSMENT — PAIN SCALES - GENERAL: PAINLEVEL: WORST PAIN (10)

## 2022-12-08 NOTE — LETTER
December 8, 2022      Beh G Dwe  [address] [place] [address]        To Whom It May Concern:    Beh G Dwe was seen in our clinic.  He should be excused from work through 12/12/22.  He may return 12/13/22 with the following light duty restrictions: sedentary work only.  Unable to stand/walk.  No lifting more than 10 pounds.  No bending/twisting.        Sincerely,        Nikia Saleh PA-C

## 2022-12-08 NOTE — PROGRESS NOTES
Assessment:   Beh G Dwe is a 42 y.o. y.o. male with past medical history significant for nicotine dependence who presents today for follow-up regarding acute on chronic left low back pain with radiation into the left lower extremity with associated weakness.  Pain flared up December 5, 2022 (3 days ago) after a slip and fall on the ice and it is severe.  My review of a CT lumbar spine is negative for fracture.  At L4-5 there appears to be a disc bulge eccentric to the left where I believe he is trapping the left L5 nerve root.  He had similar findings on an MRI lumbar spine in 2020.  Symptoms follow a left L5 pattern.  Patient has slight weakness in the left EHL and breakaway weakness left ankle dorsiflexors.       Plan:     A shared decision making plan was used.  The patient's values and choices were respected.  The following represents what was discussed and decided upon by the physician assistant and the patient.  A telephone  is present for the visit.    1.  DIAGNOSTIC TESTS:  - I reviewed the CT lumbar spine from December 6.   - I reviewed the MRI lumbar spine from February 2020.  - If symptoms persist I would recommend obtaining an updated MRI lumbar spine to better characterize.  To my eye there appears to be a disc bulge eccentric to the left at L4-5.    2.  PHYSICAL THERAPY: Patient attended 5 sessions of physical therapy ending February 20, 2020.  - I entered a new referral to physical therapy.    3.  MEDICATIONS: No medications were ordered today.  Patient has received 2 prescriptions from the emergency department and 3 prescriptions from his primary care provider in the past 48 hours.  - Patient will complete his prednisone course.  - Patient has oxycodone which he could take as needed although he does not feel it is very helpful.  - Patient has not picked up his prescription for gabapentin.  He will take 100 mg 3 times daily.  We could titrate his dose higher, depending on his response.  -  After he completes the prednisone he should begin naproxen 5 and a milligrams twice daily.  - Patient should take Tylenol 1000 mg 3 times daily.    4.  INTERVENTIONS:   -I offer the patient left L4-5, L5-S1 transforaminal epidural steroid injections.  Patient indicated he would like to proceed and an order was placed.  -If leg pain improves but he continues to have left axial low back pain returns, would recommend repeating the left L4-5, L5-S1 facet joint injections.    5.  WORK: Patient works as a  for seniors, taking them to adult .  He states that he has not been able to work because he cannot tolerate standing and he has to get in and out of the vehicle.  I provided a work note indicating that he will remain off of work through December 12.  On December 13 he can go back with light duty restrictions.  He can only do sedentary work.  He cannot do standing/walking work.  He cannot lift more than 10 pounds.  He should not bend and twist.    6.  FOLLOW-UP: Patient will follow up with me 2 weeks after his left L4-5, L5-S1 transforaminal epidural steroid injections.  If he has questions or concerns in the meantime, he should not hesitate to call.  Subjective:     Beh G Dwe is a 42 y.o. male who presents today for follow-up regarding an acute flare of chronic low back pain with radiation into the left lower extremity.  Patient had facet joint injections last year which provided 60% relief of his pain.  He states that he continued to have some mild back pain but it was manageable up until 3 days ago.  Patient was standing near his car when he slipped on ice and fell.  He landed on his bottom.  He had immediate pain but he was able to work that day.  As the day went on his pain got more and more severe.  That night he could not sleep so he presented to the emergency department the next day.  A CT was negative for fracture.  He was discharged home with prescriptions for prednisone and oxycodone.  He is  taking his medications that he does not feel like they are helping.  He saw his primary care provider yesterday.  They added gabapentin, naproxen, and Tylenol.  He has not picked up those medications yet.      Patient complains of left-sided low back pain.  Pain radiates into the left buttock, down the left posterolateral thigh, into the lateral calf, ending at the lateral ankle.  He denies numbness or tingling.  He states his left leg feels weak.  He rates his pain today as a 10 out of 10.  Pain is aggravated with standing and alleviated with lying down.  He denies any loss of bowel or bladder control.  Denies recent fevers.    Patient attended 5 sessions of physical therapy ending February 20, 2020.  He is taking prednisone 40 mg daily and oxycodone as needed but he does not feel like these are helping his pain.      Review of Systems:  Positive for weakness, pain much worse at night, trip/stumble/falls.  Negative for numbness/tingling, loss of bowel/bladder control, inability to urinate, headache, difficulty swallowing, difficulty with hand skills, fevers, unintentional weight loss.     Objective:   CONSTITUTIONAL:  Vital signs as above.  Patient appears to be in severe pain.  The patient is well nourished and well groomed.    PSYCHIATRIC:  The patient is awake, alert, oriented to person, place and time.  The patient is answering questions appropriately with clear speech.  Normal affect.  HEENT: Normocephalic, atraumatic.  Sclera clear.    SKIN:  Skin over the face, posterior torso, bilateral upper and lower extremities is clean, dry, intact without rashes.  VASCULAR: No significant lower extremity edema.  MUSCULOSKELETAL:  Gait is severely antalgic, favoring the left.  The patient is able to rise onto toes and heels bilaterally but it is very painful.  No tenderness palpation left lower lumbar paraspinous muscles.  4/5 strength left EHL and breakaway weakness left ankle dorsi flexors.  5/5 strength bilateral hip  flexors, knee flexors/extensors, right ankle dorsiflexors, bilateral ankle plantar flexors, right EHL.  NEUROLOGICAL: 2+ patellar and Achilles reflexes which are symmetric bilaterally.  Sensation to light touch is intact in the bilateral L4, L5, and S1 dermatomes.        RESULTS:   I reviewed the CT lumbar spine from [facility] dated December 6, 2022.  This shows bilateral L5 pars defects with grade 1 retrolisthesis L4-5.  At L4-5 there is a disc bulge with moderate spinal canal stenosis and mild to moderate bilateral foraminal stenosis.  At L5-S1 there is a disc bulge and mild facet arthropathy with moderate to severe right and moderate left foraminal stenosis.  Findings are similar compared with MRI from February 2020.    I reviewed the MRI lumbar spine from [facility] dated February 20, 2020.  At L4-5 there is degenerative disc disease with endplate signal changes.  There is a mild diffuse disc bulge with small left paracentral disc extrusion with mass-effect upon the traversing left L5 nerve root.  There is mild spinal canal stenosis and mild bilateral foraminal stenosis at this level.  At L3-4 there is a minimal diffuse disc bulge and slight facet arthropathy with mild left foraminal stenosis.  At L5-S1 there is a mild diffuse disc bulge and mild facet arthropathy with moderate right foraminal stenosis.  Please see report for further details.

## 2022-12-08 NOTE — PATIENT INSTRUCTIONS
Left L4/5, L5/S1 epidural steroid injection has been ordered today.      Please note that this injection uses cortisone.  The cortisone may somewhat weaken the immune system.  It is unknown how much the immune system is weakened.  It is unknown if it is weakened to the point that you may be more likely to get the COVID-19 virus, or if you do get the COVID-19 virus, if you would be sicker than you would have been if you had not had the cortisone injection.  If you do not wish to proceed with the injection, please let the nurse/physician know and do NOT schedule the injection.    Please note that since your immune system is weakened from the cortisone, having any vaccine/shot may be less effective if you have this vaccine within 2 weeks from your cortisone injection.  It is advised to wait 2 weeks after your cortisone injection to have any vaccine (or if you have a vaccine first, wait 2 weeks before you have the cortisone injection).    Please schedule this injection at least 1 week  from now to allow time for insurance prior authorization.  On the day of your injection, you cannot be sick or taking antibiotics.  If you become sick and are prescribed, please call the clinic so your injection can be rescheduled for once you have completed your antibiotics.  You will need to bring a  with you for your injection.   If you have any questions or concerns prior to your injection, please do not hesitate to call the nurse navigation line at [phone] or contact Nikia Saleh through [employer].     An order for physicaltherapy has been provided today.  Someone will call you to schedule physical therapy or you can call [phone] to schedule physical therapy.  It will be very important for you to do your physical therapy exercises on aregular basis to decrease your pain and prevent future flares of pain.

## 2022-12-08 NOTE — LETTER
12/8/2022         RE: Beh G Dwe  [address] [place] [address]        Dear Colleague,    Thank you for referring your patient, Beh G Dwe, to the [facility]. Please see a copy of my visit note below.    Assessment:   Beh G Dwe is a 42 y.o. y.o. male with past medical history significant for nicotine dependence who presents today for follow-up regarding acute on chronic left low back pain with radiation into the left lower extremity with associated weakness.  Pain flared up December 5, 2022 (3 days ago) after a slip and fall on the ice and it is severe.  My review of a CT lumbar spine is negative for fracture.  At L4-5 there appears to be a disc bulge eccentric to the left where I believe he is trapping the left L5 nerve root.  He had similar findings on an MRI lumbar spine in 2020.  Symptoms follow a left L5 pattern.  Patient has slight weakness in the left EHL and breakaway weakness left ankle dorsiflexors.       Plan:     A shared decision making plan was used.  The patient's values and choices were respected.  The following represents what was discussed and decided upon by the physician assistant and the patient.  A telephone  is present for the visit.    1.  DIAGNOSTIC TESTS:  - I reviewed the CT lumbar spine from December 6.   - I reviewed the MRI lumbar spine from February 2020.  - If symptoms persist I would recommend obtaining an updated MRI lumbar spine to better characterize.  To my eye there appears to be a disc bulge eccentric to the left at L4-5.    2.  PHYSICAL THERAPY: Patient attended 5 sessions of physical therapy ending February 20, 2020.  - I entered a new referral to physical therapy.    3.  MEDICATIONS: No medications were ordered today.  Patient has received 2 prescriptions from the emergency department and 3 prescriptions from his primary care provider in the past 48 hours.  - Patient will complete his prednisone course.  - Patient has  oxycodone which he could take as needed although he does not feel it is very helpful.  - Patient has not picked up his prescription for gabapentin.  He will take 100 mg 3 times daily.  We could titrate his dose higher, depending on his response.  - After he completes the prednisone he should begin naproxen 5 and a milligrams twice daily.  - Patient should take Tylenol 1000 mg 3 times daily.    4.  INTERVENTIONS:   -I offer the patient left L4-5, L5-S1 transforaminal epidural steroid injections.  Patient indicated he would like to proceed and an order was placed.  -If leg pain improves but he continues to have left axial low back pain returns, would recommend repeating the left L4-5, L5-S1 facet joint injections.    5.  WORK: Patient works as a  for seniors, taking them to adult .  He states that he has not been able to work because he cannot tolerate standing and he has to get in and out of the vehicle.  I provided a work note indicating that he will remain off of work through December 12.  On December 13 he can go back with light duty restrictions.  He can only do sedentary work.  He cannot do standing/walking work.  He cannot lift more than 10 pounds.  He should not bend and twist.    6.  FOLLOW-UP: Patient will follow up with me 2 weeks after his left L4-5, L5-S1 transforaminal epidural steroid injections.  If he has questions or concerns in the meantime, he should not hesitate to call.  Subjective:     Beh G Dwe is a 42 y.o. male who presents today for follow-up regarding an acute flare of chronic low back pain with radiation into the left lower extremity.  Patient had facet joint injections last year which provided 60% relief of his pain.  He states that he continued to have some mild back pain but it was manageable up until 3 days ago.  Patient was standing near his car when he slipped on ice and fell.  He landed on his bottom.  He had immediate pain but he was able to work that day.  As the day  went on his pain got more and more severe.  That night he could not sleep so he presented to the emergency department the next day.  A CT was negative for fracture.  He was discharged home with prescriptions for prednisone and oxycodone.  He is taking his medications that he does not feel like they are helping.  He saw his primary care provider yesterday.  They added gabapentin, naproxen, and Tylenol.  He has not picked up those medications yet.      Patient complains of left-sided low back pain.  Pain radiates into the left buttock, down the left posterolateral thigh, into the lateral calf, ending at the lateral ankle.  He denies numbness or tingling.  He states his left leg feels weak.  He rates his pain today as a 10 out of 10.  Pain is aggravated with standing and alleviated with lying down.  He denies any loss of bowel or bladder control.  Denies recent fevers.    Patient attended 5 sessions of physical therapy ending February 20, 2020.  He is taking prednisone 40 mg daily and oxycodone as needed but he does not feel like these are helping his pain.      Review of Systems:  Positive for weakness, pain much worse at night, trip/stumble/falls.  Negative for numbness/tingling, loss of bowel/bladder control, inability to urinate, headache, difficulty swallowing, difficulty with hand skills, fevers, unintentional weight loss.     Objective:   CONSTITUTIONAL:  Vital signs as above.  Patient appears to be in severe pain.  The patient is well nourished and well groomed.    PSYCHIATRIC:  The patient is awake, alert, oriented to person, place and time.  The patient is answering questions appropriately with clear speech.  Normal affect.  HEENT: Normocephalic, atraumatic.  Sclera clear.    SKIN:  Skin over the face, posterior torso, bilateral upper and lower extremities is clean, dry, intact without rashes.  VASCULAR: No significant lower extremity edema.  MUSCULOSKELETAL:  Gait is severely antalgic, favoring the left.   The patient is able to rise onto toes and heels bilaterally but it is very painful.  No tenderness palpation left lower lumbar paraspinous muscles.  4/5 strength left EHL and breakaway weakness left ankle dorsi flexors.  5/5 strength bilateral hip flexors, knee flexors/extensors, right ankle dorsiflexors, bilateral ankle plantar flexors, right EHL.  NEUROLOGICAL: 2+ patellar and Achilles reflexes which are symmetric bilaterally.  Sensation to light touch is intact in the bilateral L4, L5, and S1 dermatomes.        RESULTS:   I reviewed the CT lumbar spine from [facility] dated December 6, 2022.  This shows bilateral L5 pars defects with grade 1 retrolisthesis L4-5.  At L4-5 there is a disc bulge with moderate spinal canal stenosis and mild to moderate bilateral foraminal stenosis.  At L5-S1 there is a disc bulge and mild facet arthropathy with moderate to severe right and moderate left foraminal stenosis.  Findings are similar compared with MRI from February 2020.    I reviewed the MRI lumbar spine from [facility] dated February 20, 2020.  At L4-5 there is degenerative disc disease with endplate signal changes.  There is a mild diffuse disc bulge with small left paracentral disc extrusion with mass-effect upon the traversing left L5 nerve root.  There is mild spinal canal stenosis and mild bilateral foraminal stenosis at this level.  At L3-4 there is a minimal diffuse disc bulge and slight facet arthropathy with mild left foraminal stenosis.  At L5-S1 there is a mild diffuse disc bulge and mild facet arthropathy with moderate right foraminal stenosis.  Please see report for further details.        Again, thank you for allowing me to participate in the care of your patient.        Sincerely,        Nikia Saleh PA-C

## 2022-12-09 ENCOUNTER — HOSPITAL ENCOUNTER (EMERGENCY)
Facility: HOSPITAL | Age: 42
Discharge: HOME | End: 2022-12-10
Attending: EMERGENCY MEDICINE | Admitting: EMERGENCY MEDICINE
Payer: COMMERCIAL

## 2022-12-09 DIAGNOSIS — M54.10 RADICULAR LEG PAIN: ICD-10-CM

## 2022-12-09 PROCEDURE — 99285 EMERGENCY DEPT VISIT HI MDM: CPT | Mod: 25

## 2022-12-09 PROCEDURE — 96374 THER/PROPH/DIAG INJ IV PUSH: CPT

## 2022-12-09 PROCEDURE — 96361 HYDRATE IV INFUSION ADD-ON: CPT

## 2022-12-10 ENCOUNTER — APPOINTMENT (OUTPATIENT)
Dept: MRI IMAGING | Facility: HOSPITAL | Age: 42
End: 2022-12-10
Attending: EMERGENCY MEDICINE
Payer: COMMERCIAL

## 2022-12-10 VITALS
HEART RATE: 75 BPM | WEIGHT: 160 LBS | DIASTOLIC BLOOD PRESSURE: 90 MMHG | HEIGHT: 67 IN | TEMPERATURE: 97.9 F | BODY MASS INDEX: 25.11 KG/M2 | SYSTOLIC BLOOD PRESSURE: 135 MMHG | OXYGEN SATURATION: 100 % | RESPIRATION RATE: 16 BRPM

## 2022-12-10 PROCEDURE — 72148 MRI LUMBAR SPINE W/O DYE: CPT

## 2022-12-10 PROCEDURE — 258N000003 HC RX IP 258 OP 636: Performed by: EMERGENCY MEDICINE

## 2022-12-10 PROCEDURE — 250N000011 HC RX IP 250 OP 636: Performed by: EMERGENCY MEDICINE

## 2022-12-10 PROCEDURE — 250N000013 HC RX MED GY IP 250 OP 250 PS 637: Performed by: EMERGENCY MEDICINE

## 2022-12-10 RX ORDER — GABAPENTIN 300 MG/1
300 CAPSULE ORAL ONCE
Status: COMPLETED | OUTPATIENT
Start: 2022-12-10 | End: 2022-12-10

## 2022-12-10 RX ORDER — GABAPENTIN 300 MG/1
300 CAPSULE ORAL 3 TIMES DAILY
Qty: 30 CAPSULE | Refills: 0 | Status: SHIPPED | OUTPATIENT
Start: 2022-12-10 | End: 2023-02-01

## 2022-12-10 RX ORDER — MORPHINE SULFATE 4 MG/ML
4 INJECTION, SOLUTION INTRAMUSCULAR; INTRAVENOUS ONCE
Status: COMPLETED | OUTPATIENT
Start: 2022-12-10 | End: 2022-12-10

## 2022-12-10 RX ORDER — HYDROCODONE BITARTRATE AND ACETAMINOPHEN 5; 325 MG/1; MG/1
1 TABLET ORAL EVERY 6 HOURS PRN
Qty: 8 TABLET | Refills: 0 | Status: SHIPPED | OUTPATIENT
Start: 2022-12-10 | End: 2022-12-13

## 2022-12-10 RX ADMIN — SODIUM CHLORIDE 1000 ML: 9 INJECTION, SOLUTION INTRAVENOUS at 00:52

## 2022-12-10 RX ADMIN — GABAPENTIN 300 MG: 300 CAPSULE ORAL at 02:22

## 2022-12-10 RX ADMIN — MORPHINE SULFATE 4 MG: 4 INJECTION INTRAVENOUS at 00:49

## 2022-12-10 ASSESSMENT — ACTIVITIES OF DAILY LIVING (ADL): ADLS_ACUITY_SCORE: 35

## 2022-12-10 NOTE — ED TRIAGE NOTES
Pt reports that he fell last Monday. He fell ice and hurt his lower back. Pt was seen here for back pain but reports that his pain is not any better. Pain also goes down his left leg. Reports not being able to sleep for the past 4 days.

## 2022-12-10 NOTE — ED PROVIDER NOTES
EMERGENCY DEPARTMENT ENCOUnter      NAME: Beh G Dwe  AGE: 42 year old male  YOB: 1980  MRN: [id number]  EVALUATION DATE & TIME: 2022 11:51 PM    PCP: Sonia Wu    ED PROVIDER: Harvey Arroyo DO      Chief Complaint   Patient presents with     Back Pain     Leg Pain         FINAL IMPRESSION:  1. Radicular leg pain          ED COURSE & MEDICAL DECISION MAKIN:14 AM Patient was placed in room 33. I met with the patient, obtained history, performed an initial exam, and discussed options and plan for diagnostics and treatment here in the ED.       The patient presented to the emergency department today with complaints of low back pain and left leg pain.  He was seen recently for the same symptoms and had a CT at that time showing no significant acute process.  His pain is worsened since then.  He has pain with any movement of the low back.  No focal neurologic deficits.  MRI today reveals evidence of L5 nerve impingement on the left which is likely the cause of his symptoms.  He is feeling better after pain medications.  Plan will be for discharge home with gabapentin and instructions to follow-up closely with the spine clinic.  He is comfortable with this plan.      Medical Decision Making    History:    Supplemental history from: N/A    External Record(s) reviewed: Documented in HPI, if applicable.    Work Up:    Chart documentation includes differential considered and any EKGs or imaging interpreted by provider.    In additional to work up documented, I considered the following work up: See chart documentation, if applicable.    External consultation:    Discussion of management with another provider: See chart documentation, if applicable    Complicating factors:    Care impacted by chronic illness: None    Care affected by social determinants of health: N/A    Disposition considerations: Discharge. I prescribed additional prescription strength medication(s) as charted. I considered  admission, but discharged patient after reassuring labs and/or imaging.        At the conclusion of the encounter I discussed the results of all of the tests and the disposition. The questions were answered. The patient or family acknowledged understanding and was agreeable with the care plan.         MEDICATIONS GIVEN IN THE EMERGENCY:  Medications   gabapentin (NEURONTIN) capsule 300 mg (has no administration in time range)   0.9% sodium chloride BOLUS (1,000 mLs Intravenous New Bag 12/10/22 0052)   morphine (PF) injection 4 mg (4 mg Intravenous Given 12/10/22 0049)       NEW PRESCRIPTIONS STARTED AT TODAY'S ER VISIT  New Prescriptions    GABAPENTIN (NEURONTIN) 300 MG CAPSULE    Take 1 capsule (300 mg) by mouth 3 times daily for 10 days    HYDROCODONE-ACETAMINOPHEN (NORCO) 5-325 MG TABLET    Take 1 tablet by mouth every 6 hours as needed for pain          =================================================================    HPI        Beh G Dwe is a 42 year old male with no pertinent history who presents to this ED by walk in for evaluation of back pain.     Phone  was used to obtained patient history (Chelo).     Per chart review, patient presented to [facility] ED on 12/6 for evaluation of chronic back pain with pain radiating down his left leg after slipping and falling on the ice. Back pain was worse since fall. CT showed the following result:  1.  Multilevel spondylotic changes with moderate spinal canal stenosis at L4-L5, mild at L3-L4.  2.  Disc bulge at L5-S1 contacting the exiting bilateral L5 nerve roots without significant central spinal canal stenosis.  3.  Varying degrees of foraminal narrowing, worst and moderate to severe at L5-S1 on the right, moderate on the left. Mild to moderate bilateral L4-L5 foraminal narrowing.  4.  Redemonstration of bilateral L5 pars interarticularis defects with similar grade 1 retrolisthesis at L4-L5.  Patient was discharged with steroids and pain medication,  as well as with plan to follow up with primary care.    A few days ago, the patient slipped on ice and fell. He was seen in the ED soon after for evaluation of back pain with intermittent pain radiating down his left leg. He has been taking the pain medications prescribed to him at this visit, but they have not been helping. In the ED, he reports that the pain has been worsening. He is also experiencing urinary retention and decrease urine output. He is not able to stand to urinate and has to sit. Patient denies any other complaints at this time.     REVIEW OF SYSTEMS     Constitutional:  Denies fever or chills  HENT:  Denies sore throat   Respiratory:  Denies cough or shortness of breath   Cardiovascular:  Denies chest pain or palpitations  GI:  Endorses urinary retention and decrease urine output. Denies abdominal pain, nausea, or vomiting  Musculoskeletal:  Endorses back pain and leg pain.   Skin:  Denies rash   Neurologic:  Denies headache, focal weakness or sensory changes    All other systems reviewed and are negative      PAST MEDICAL HISTORY:  Past Medical History:   Diagnosis Date     Tuberculosis     latent TB.  completed a course of INR in 2010       PAST SURGICAL HISTORY:  History reviewed. No pertinent surgical history.        CURRENT MEDICATIONS:    gabapentin (NEURONTIN) 300 MG capsule  HYDROcodone-acetaminophen (NORCO) 5-325 MG tablet  acetaminophen (TYLENOL) 500 MG tablet  fluticasone propionate (FLONASE) 50 mcg/actuation nasal spray  gabapentin (NEURONTIN) 100 MG capsule  naproxen (NAPROSYN) 500 MG tablet  predniSONE (DELTASONE) 20 MG tablet        ALLERGIES:  No Known Allergies    FAMILY HISTORY:  History reviewed. No pertinent family history.    SOCIAL HISTORY:   Social History     Socioeconomic History     Marital status:      Spouse name: None     Number of children: None     Years of education: None     Highest education level: None   Tobacco Use     Smoking status: Every Day     Types:  "Cigarettes     Smokeless tobacco: Current     Tobacco comments:     chews betel nut w/o tobacco   Substance and Sexual Activity     Alcohol use: Yes     Comment: Alcoholic Drinks/day: sometimes     Drug use: Never     Sexual activity: Yes     Partners: Female       VITALS:  Patient Vitals for the past 24 hrs:   BP Temp Temp src Pulse Resp SpO2 Height Weight   12/09/22 2346 122/77 97.9  F (36.6  C) Oral 98 16 98 % 1.702 m (5' 7\") 72.6 kg (160 lb)       PHYSICAL EXAM    Constitutional:  Well developed, Well nourished,  HENT:  Normocephalic, Atraumatic, Bilateral external ears normal, Oropharynx moist, Nose normal.   Neck:  Normal range of motion, No meningismus, No stridor.   Eyes:  EOMI, Conjunctiva normal, No discharge.   Respiratory:  Normal breath sounds, No respiratory distress, No wheezing, No chest tenderness.   Cardiovascular:  Normal heart rate, Normal rhythm, No murmurs  GI:  Soft, No tenderness, No guarding, No CVA tenderness.   Musculoskeletal:   No tenderness to palpation or major deformities noted.  Pain with any movement of the low back  Integument:  Warm, Dry, No erythema, No rash.   Neurologic:  Alert & oriented x 3, Normal motor function, Normal sensory function, No focal deficits noted.   Psychiatric:  Affect normal, Judgment normal, Mood normal.            RADIOLOGY:  I have independently reviewed and interpreted the above imaging, pending the final radiology read.  MR Lumbar Spine w/o Contrast   Final Result   CONCLUSION:   1.  Moderate lumbar spondylosis. No high-grade canal narrowing.      2.  Progressive left paracentral extrusion at L4-L5 results in left lateral recess narrowing exerting mass effect on the descending left L5 nerve. Correlate for left L5 radicular symptoms.      3.  Marked progressive right L5-S1 foraminal narrowing.      4.  Multilevel degenerative disc disease and facet arthropathy.              I, Mariely Driver, am serving as a scribe to document services personally " performed by Dr. Arroyo based on my observation and the provider's statements to me. I, Harvey Arroyo, DO attest that Mariely Driver is acting in a scribe capacity, has observed my performance of the services and has documented them in accordance with my direction.    Harvey Arroyo, DO  Emergency Medicine  [facility] EMERGENCY DEPARTMENT  [address] [place] [id number]  [phone]  Dept: [phone]     Harvey Arroyo MD  12/10/22 0222

## 2022-12-10 NOTE — DISCHARGE INSTRUCTIONS
Your MRI tonight shows signs of nerve compression in your low back.  Follow-up with the spine center in the next several days for further treatment and return to the ER for any worsening symptoms or other concerns.

## 2023-01-04 ENCOUNTER — RADIOLOGY INJECTION OFFICE VISIT (OUTPATIENT)
Dept: PHYSICAL MEDICINE AND REHAB | Facility: CLINIC | Age: 43
End: 2023-01-04
Attending: PHYSICIAN ASSISTANT
Payer: COMMERCIAL

## 2023-01-04 VITALS
RESPIRATION RATE: 16 BRPM | SYSTOLIC BLOOD PRESSURE: 134 MMHG | OXYGEN SATURATION: 97 % | DIASTOLIC BLOOD PRESSURE: 78 MMHG | HEART RATE: 88 BPM | TEMPERATURE: 97.8 F

## 2023-01-04 DIAGNOSIS — M54.16 LUMBAR RADICULAR PAIN: ICD-10-CM

## 2023-01-04 PROCEDURE — 64483 NJX AA&/STRD TFRM EPI L/S 1: CPT | Mod: LT | Performed by: PAIN MEDICINE

## 2023-01-04 PROCEDURE — 64484 NJX AA&/STRD TFRM EPI L/S EA: CPT | Mod: LT | Performed by: PAIN MEDICINE

## 2023-01-04 RX ORDER — DEXAMETHASONE SODIUM PHOSPHATE 10 MG/ML
INJECTION, SOLUTION INTRAMUSCULAR; INTRAVENOUS
Status: COMPLETED | OUTPATIENT
Start: 2023-01-04 | End: 2023-01-04

## 2023-01-04 RX ORDER — LIDOCAINE HYDROCHLORIDE 10 MG/ML
INJECTION, SOLUTION EPIDURAL; INFILTRATION; INTRACAUDAL; PERINEURAL
Status: COMPLETED | OUTPATIENT
Start: 2023-01-04 | End: 2023-01-04

## 2023-01-04 RX ADMIN — LIDOCAINE HYDROCHLORIDE 4 ML: 10 INJECTION, SOLUTION EPIDURAL; INFILTRATION; INTRACAUDAL; PERINEURAL at 10:05

## 2023-01-04 RX ADMIN — DEXAMETHASONE SODIUM PHOSPHATE 20 MG: 10 INJECTION, SOLUTION INTRAMUSCULAR; INTRAVENOUS at 10:05

## 2023-01-04 ASSESSMENT — PAIN SCALES - GENERAL: PAINLEVEL: MODERATE PAIN (4)

## 2023-01-04 NOTE — PATIENT INSTRUCTIONS
DISCHARGE INSTRUCTIONS    During office hours (8:00 a.m.- 4:00 p.m.) questions or concerns may be answered  by calling Spine Center Navigation Nurses at  [phone].  Messages received after hours will be returned the following business day.      In the case of an emergency, please dial 911 or seek assistance at the nearest Emergency Room/Urgent Care facility.     All Patients:    You may experience an increase in your symptoms for the first 2 days (It may take anywhere between 2 days- 2 weeks for the steroid to have maximum effect).    You may use ice on the injection site, as frequently as 20 minutes each hour if needed.    You may take your pain medicine.    You may continue taking your regular medication after your injection. If you have had a Medial Branch Block you may resume pain medication once your pain diary is completed.    You may shower. No swimming, tub bath or hot tub for 48 hours.  You may remove your bandaid/bandage as soon as you are home.    You may resume light activities, as tolerated.    Resume your usual diet as tolerated.    It is strongly advised that you do not drive for 1-3 hours post injection.    If you have had oral sedation:  Do not drive for 8 hours post injection.      If you have had IV sedation:  Do not drive for 24 hours post injection.  Do not operate hazardous machinery or make important personal/business decisions for 24 hours.      POSSIBLE STEROID SIDE EFFECTS (If steroid/cortisone was used for your procedure)    -If you experience these symptoms, it should only last for a short period    Swelling of the legs              Skin redness (flushing)     Mouth (oral) irritation   Blood sugar (glucose) levels            Sweats                    Mood changes  Headache  Sleeplessness  Weakened immune system for up to 14 days, which could increase the risk of kimberly the COVID-19 virus and/or experiencing more severe symptoms of the disease, if exposed.  Decreased  effectiveness of the flu vaccine if given within 2 weeks of the steroid.         POSSIBLE PROCEDURE SIDE EFFECTS  -Call the Spine Center if you are concerned  Increased Pain           Increased numbness/tingling      Nausea/Vomiting          Bruising/bleeding at site      Redness or swelling                                              Difficulty walking      Weakness           Fever greater than 100.5    *In the event of a severe headache after an epidural steroid injection that is relieved by lying down, please call the [facility] to speak with a clinical staff member*

## 2023-01-17 NOTE — PROGRESS NOTES
Assessment:   Beh G Dwe is a 42 y.o. y.o. male with past medical history significant for nicotine dependence who presents today for follow-up regarding acute on chronic left low back pain with radiation into the left lower extremity with associated numbness, tingling, weakness.  My review of and MRI lumbar spine from [facility] dated December 9, 2022 shows that at L4-5 there is a caudally dissecting disc extrusion with left lateral recess stenosis and mass-effect on the left L5 nerve root.Patient is status post left L4-5, L5-S1 transforaminal epidural steroid injections January 4, 2023 which did not provide any relief of his pain.  For the past several days his pain is actually gotten worse.  On exam he had weakness in left EHL.  He had a sensory deficit left L5 dermatome.  Symptoms are consistent with left L5 radiculopathy.       Plan:     A shared decision making plan was used.  The patient's values and choices were respected.  The following represents what was discussed and decided upon by the physician assistant and the patient.  A telephone  is present for the visit.    1.  DIAGNOSTIC TESTS:  - I reviewed the MRI lumbar spine.  No further diagnostic tests were ordered.    2.  PHYSICAL THERAPY: Patient attended 5 sessions of physical therapy ending February 20, 2020.  - Patient would not tolerate physical therapy due to the severity of his pain.    3.  MEDICATIONS:  - I prescribed pregabalin 50 mg 3 times daily.  - Patient will take pregabalin instead of gabapentin.  He is taking 300 mg 3 times daily but he does not feel like it is helping and it is causing some dizziness.  - Patient has failed to have any relief of his pain with Tylenol, naproxen, hydrocodone, oxycodone, tramadol, nabumetone, prednisone.  No additional opiates prescribed today.    4.  INTERVENTIONS: No additional interventions were ordered.    5.  REFERRALS: Patient is scheduled to see neurosurgery on January 26, 2023.    6.   FOLLOW-UP: Patient is going to follow-up with me as needed.  We will await recommendations from neurosurgery.  If he has questions or concerns, he should not hesitate to call.  Subjective:     Beh G Dwe is a 42 y.o. male who presents today for follow-up regarding an acute flare of chronic low back pain with radiation into the left lower extremity.  Patient is following up after left L4-5, L5-S1 transforaminal epidural steroid injections January 4, 2023.  Patient reports no improvement in his pain.  For the past several days his pain has been more severe.  Denies any injury or event to cause the increased pain.      Patient complains of left low back pain.  Pain radiates into the left buttock, down the left posterolateral thigh, into the lateral calf, wrapping around into the anterior ankle, dorsal foot, ending in the great toe.  He has numbness and tingling in the same distribution as his pain.  He feels weakness in the left leg with walking.  He denies loss of bowel or bladder control.  Denies recent fevers or chills.  He rates his pain today as a 10 out of 10.  Pain is aggravated with standing, sitting, walking.  Pain is alleviated with lying down.  He is not able to sleep because of the pain.    Patient attended 5 sessions of physical therapy ending February 20, 2020.  He is taking tramadol 50 mg 3 times daily, nabumetone 500 to 1000 mg 3 times daily, and gabapentin 300 mg 3 times daily.  These medicines are not helpful.  He is having dizziness with the gabapentin.  Oxycodone, hydrocodone, naproxen, Tylenol, and prednisone all failed to provide relief previously.      Review of Systems:  Positive for numbness/tingling, weakness, pain much worse at night.  Negative for loss of bowel/bladder control, inability to urinate, headache, trip/stumble/falls, difficulty swallowing, difficulty with hand skills, fevers, unintentional weight loss.     Objective:   CONSTITUTIONAL:  Vital signs as above.  Patient appears to be  in severe pain.  The patient is well nourished and well groomed.    PSYCHIATRIC:  The patient is awake, alert, oriented to person, place and time.  The patient is answering questions appropriately with clear speech.  Normal affect.  HEENT: Normocephalic, atraumatic.  Sclera clear.    SKIN:  Skin over the face, posterior torso, bilateral upper and lower extremities is clean, dry, intact without rashes.  VASCULAR: No significant lower extremity edema.  MUSCULOSKELETAL:  Gait is deferred.  The patient is able to rise tender to palpation palpation left lower lumbar paraspinous muscles.  4-/5 strength left EHL.  5/5 strength bilateral hip flexors, knee flexors/extensors, bilateral ankle dorsiflexors, bilateral ankle plantar flexors, right EHL.  NEUROLOGICAL: 2+ patellar and Achilles reflexes which are symmetric bilaterally.  Diminished sensation left L5 dermatome.      RESULTS:   I reviewed the MRI lumbar spine from [facility] dated December 9, 2022.  At L4-5 there is a caudally dissecting disc extrusion with left lateral recess stenosis and mass-effect on the left L5 nerve root.

## 2023-01-18 ENCOUNTER — OFFICE VISIT (OUTPATIENT)
Dept: PHYSICAL MEDICINE AND REHAB | Facility: CLINIC | Age: 43
End: 2023-01-18
Payer: COMMERCIAL

## 2023-01-18 VITALS — DIASTOLIC BLOOD PRESSURE: 82 MMHG | SYSTOLIC BLOOD PRESSURE: 124 MMHG | TEMPERATURE: 97.8 F | HEART RATE: 112 BPM
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
## 2023-01-18 DIAGNOSIS — M54.16 LUMBAR RADICULOPATHY: Primary | ICD-10-CM

## 2023-01-18 DIAGNOSIS — M54.16 LUMBAR RADICULAR PAIN: ICD-10-CM

## 2023-01-18 DIAGNOSIS — M51.26 LUMBAR DISC HERNIATION: Primary | ICD-10-CM

## 2023-01-18 PROCEDURE — 99204 OFFICE O/P NEW MOD 45 MIN: CPT | Performed by: PHYSICAL MEDICINE & REHABILITATION

## 2023-01-18 RX ORDER — NABUMETONE 500 MG/1
500-1000 TABLET, FILM COATED ORAL 2 TIMES DAILY PRN
Qty: 90 TABLET | Refills: 1 | Status: SHIPPED | OUTPATIENT
Start: 2023-01-18 | End: 2023-05-01

## 2023-01-18 RX ORDER — TRAMADOL HYDROCHLORIDE 50 MG/1
50 TABLET ORAL 3 TIMES DAILY PRN
Qty: 20 TABLET | Refills: 0 | Status: SHIPPED | OUTPATIENT
Start: 2023-01-18 | End: 2023-01-25

## 2023-01-18 ASSESSMENT — PAIN SCALES - GENERAL: PAINLEVEL: WORST PAIN (10)

## 2023-01-18 NOTE — LETTER
1/18/2023         RE: Beh G Dwe  [address] [place] [address]        Dear Colleague,    Thank you for referring your patient, Beh G Dwe, to the [facility]. Please see a copy of my visit note below.    Assessment/Plan:      Beh was seen today for back pain.    Diagnoses and all orders for this visit:    Lumbar disc herniation  -     nabumetone (RELAFEN) 500 MG tablet; Take 1-2 tablets (500-1,000 mg) by mouth 2 times daily as needed for moderate pain (4-6)  -     traMADol (ULTRAM) 50 MG tablet; Take 1 tablet (50 mg) by mouth 3 times daily as needed for severe pain (7-10)  -     Neurosurgery Referral; Future    Lumbar radicular pain  -     nabumetone (RELAFEN) 500 MG tablet; Take 1-2 tablets (500-1,000 mg) by mouth 2 times daily as needed for moderate pain (4-6)  -     traMADol (ULTRAM) 50 MG tablet; Take 1 tablet (50 mg) by mouth 3 times daily as needed for severe pain (7-10)  -     Neurosurgery Referral; Future    Fall, subsequent encounter  -     Neurosurgery Referral; Future         Assessment: Pleasant 42 year old male with past medical history significant for nicotine dependence with:    1.  Acute on chronic lumbar spine pain with left lower extremity radicular pain and associated weakness.  Initial flare December, 2022 after fall on ice.  CT was negative for fracture.  MRI of the lumbar spine reveals degenerative disc disease L4-5 L5-S1 with a left paracentral disc herniation compressing left L5 nerve in the lateral recess.   L5 radicular symptoms.  Increased pain following a left L4-5 and L5-S1 TFESI January 4, 2023.   Pain is remaining severe with left lower extremity radicular pain and weakness despite pain medication.           Discussion:    1.  Discussed the diagnosis and treatment options with the patient and his significant other.  He is having severe pain and has to lay down during the exam and interview several times.  We discussed the disc herniation discussed  medication management he has failed conservative management with injections.  Would recommend surgical evaluation and he agrees.    2.  Recommend neurosurgical evaluation referral was placed.    3.  Trial nabumetone in place of naproxen 500-1000 mg twice daily as needed for pain.    4.  We will trial tramadol 50 mg 3 times daily as needed, #20.  No refills.  Has reportedly had oxycodone in the past which was not helpful for him.   checked and shows hydrocodone prescribed December 10 And oxycodone December 7.    5.  He was taking gabapentin 300 mg 3 times daily and took 1 this morning but causes dizziness.  Instruct him to take 1 more this evening and then may discontinue.    6.  Follow-up with Nikia Saleh 1 week to assess pain management.  It was our pleasure caring for your patient today, if there any questions or concerns please do not hesitate to contact us.      Subjective:   Patient ID: Beh G Dwe is a 42 year old male.    History of Present Illness:  Patient presented today for follow-up of lumbar spine pain.  He had an appointment in 2 days scheduled with Nikia Saleh however due to increased pain he presented today as a walk-in.      His pain remains severe left-sided low back pain left lower extremity pain paresthesias down the lateral leg to the dorsal left foot.  Pain is a 10/10 today.  Worse with sitting standing lying down.  Better with massage.  There was a question if he fell today in the parking lot due to pain and weakness of the left leg but he did not fall.  He is taking gabapentin 300 mg 3 times daily but this causes dizziness.  He took 1 this morning.  Had an injection nearly 2 weeks ago lumbar spine left L4-5 L5-S1 TFESI.  Increased pain since that time.  No fevers or chills.  No increased weakness but does feel cramping and weakness through his leg.  Telephone  was present today.    I reviewed old notes and has been to the emergency department.  Has been on oxycodone and hydrocodone  in the past oxycodone was reportedly not helpful for him.    Imaging: MRI December 9, 2022 imaging personally reviewed for medical decision-making purposes.  She reveals degenerative disc disease mild T2 signal loss L3-4 or moderate disc height loss and signal loss L4 4-5 and L5-S1 broad-based disc bulge with left paracentral disc herniated component L4-5 resulting in left lateral recess stenosis.  Compression of the left L5 nerve.    Review of Systems: Pertinent positives: Paresthesias and weakness left leg.  Has had a fall.  Denies bowel or bladder incontinence, headache, fevers, unintentional weight loss.         Prior interventions:  1.  Left L4-5 and L5-S1 TFESI.      Past Medical History:   Diagnosis Date     Tuberculosis     latent TB.  completed a course of INR in 2010       The following portions of the patient's history were reviewed and updated as appropriate: allergies, current medications, past family history, past medical history, past social history, past surgical history and problem list.           Objective:   Physical Exam:    /82   Pulse 112   Temp 97.8  F (36.6  C) (Oral)   There is no height or weight on file to calculate BMI.      General: Alert and oriented with normal affect. Attention, knowledge, memory, and language are intact with telephone .  No acute distress although sits up and lays down throughout the examination and interview.  Eyes: Sclerae are clear.  Respirations: Unlabored. CV: No lower extremity edema.     Gait:  Nonantalgic  Positive seated straight leg raise on the left  Sensation is decreased to light touch left lateral malleolus and dorsal left foot  Reflexes are   2+ patellar and Achilles with downgoing toes.    Manual muscle testing reveals:  Right /Left out of 5     5/5 hip flexors  5/5 knee flexors  5/5 knee extensors  5/5 ankle plantar flexors  5/5- ankle dorsiflexors  5/4  [facility]        Again, thank you for allowing me to participate in the care of your  patient.        Sincerely,        Pavan Love, DO

## 2023-01-18 NOTE — PATIENT INSTRUCTIONS
Nabumetone (which is an anti-inflammatory) medication is prescribed today. Take 1-2 tablets 2 times a day as needed for pain. This medication should be taken with food and water to prevent any stomach upset. Do not take ibuprofen/Advil/Motrin/Aleve/naproxen while you take Nabumetone. Please call if you have any side effects.   Stop Naproxen  Take one more dose of gabapentin tonight at bedtime for pain and then you may discontinue if it is making you dizzy    Tramadol  was prescribed for you today Please lock this medication up when you are not taking it. Do not share this medication with other people. Do not increase the dose without permission from your physician. Do not drink alcohol while you take this medication as this can lead to death. Do not take other pain medications without approval from your physician or this can also lead to death. If you need a refill of this medication, you must come in to clinic by appointment. Please call if you have any questions on how to take this medication.   See Neurosurgery to discuss surgical options

## 2023-01-18 NOTE — PROGRESS NOTES
Assessment/Plan:      Beh was seen today for back pain.    Diagnoses and all orders for this visit:    Lumbar disc herniation  -     nabumetone (RELAFEN) 500 MG tablet; Take 1-2 tablets (500-1,000 mg) by mouth 2 times daily as needed for moderate pain (4-6)  -     traMADol (ULTRAM) 50 MG tablet; Take 1 tablet (50 mg) by mouth 3 times daily as needed for severe pain (7-10)  -     Neurosurgery Referral; Future    Lumbar radicular pain  -     nabumetone (RELAFEN) 500 MG tablet; Take 1-2 tablets (500-1,000 mg) by mouth 2 times daily as needed for moderate pain (4-6)  -     traMADol (ULTRAM) 50 MG tablet; Take 1 tablet (50 mg) by mouth 3 times daily as needed for severe pain (7-10)  -     Neurosurgery Referral; Future    Fall, subsequent encounter  -     Neurosurgery Referral; Future         Assessment: Pleasant 42 year old male with past medical history significant for nicotine dependence with:    1.  Acute on chronic lumbar spine pain with left lower extremity radicular pain and associated weakness.  Initial flare December, 2022 after fall on ice.  CT was negative for fracture.  MRI of the lumbar spine reveals degenerative disc disease L4-5 L5-S1 with a left paracentral disc herniation compressing left L5 nerve in the lateral recess.   L5 radicular symptoms.  Increased pain following a left L4-5 and L5-S1 TFESI January 4, 2023.   Pain is remaining severe with left lower extremity radicular pain and weakness despite pain medication.           Discussion:    1.  Discussed the diagnosis and treatment options with the patient and his significant other.  He is having severe pain and has to lay down during the exam and interview several times.  We discussed the disc herniation discussed medication management he has failed conservative management with injections.  Would recommend surgical evaluation and he agrees.    2.  Recommend neurosurgical evaluation referral was placed.    3.  Trial nabumetone in place of naproxen  500-1000 mg twice daily as needed for pain.    4.  We will trial tramadol 50 mg 3 times daily as needed, #20.  No refills.  Has reportedly had oxycodone in the past which was not helpful for him.  [facility] checked and shows hydrocodone prescribed December 10 And oxycodone December 7.    5.  He was taking gabapentin 300 mg 3 times daily and took 1 this morning but causes dizziness.  Instruct him to take 1 more this evening and then may discontinue.    6.  Follow-up with Niika Saleh 1 week to assess pain management.  It was our pleasure caring for your patient today, if there any questions or concerns please do not hesitate to contact us.      Subjective:   Patient ID: Beh G Dwe is a 42 year old male.    History of Present Illness:  Patient presented today for follow-up of lumbar spine pain.  He had an appointment in 2 days scheduled with Nikia Saleh however due to increased pain he presented today as a walk-in.      His pain remains severe left-sided low back pain left lower extremity pain paresthesias down the lateral leg to the dorsal left foot.  Pain is a 10/10 today.  Worse with sitting standing lying down.  Better with massage.  There was a question if he fell today in the parking lot due to pain and weakness of the left leg but he did not fall.  He is taking gabapentin 300 mg 3 times daily but this causes dizziness.  He took 1 this morning.  Had an injection nearly 2 weeks ago lumbar spine left L4-5 L5-S1 TFESI.  Increased pain since that time.  No fevers or chills.  No increased weakness but does feel cramping and weakness through his leg.  Telephone  was present today.    I reviewed old notes and has been to the emergency department.  Has been on oxycodone and hydrocodone in the past oxycodone was reportedly not helpful for him.    Imaging: MRI December 9, 2022 imaging personally reviewed for medical decision-making purposes.  She reveals degenerative disc disease mild T2 signal loss L3-4 or moderate disc  height loss and signal loss L4 4-5 and L5-S1 broad-based disc bulge with left paracentral disc herniated component L4-5 resulting in left lateral recess stenosis.  Compression of the left L5 nerve.    Review of Systems: Pertinent positives: Paresthesias and weakness left leg.  Has had a fall.  Denies bowel or bladder incontinence, headache, fevers, unintentional weight loss.         Prior interventions:  1.  Left L4-5 and L5-S1 TFESI.      Past Medical History:   Diagnosis Date     Tuberculosis     latent TB.  completed a course of INR in 2010       The following portions of the patient's history were reviewed and updated as appropriate: allergies, current medications, past family history, past medical history, past social history, past surgical history and problem list.           Objective:   Physical Exam:    /82   Pulse 112   Temp 97.8  F (36.6  C) (Oral)   There is no height or weight on file to calculate BMI.      General: Alert and oriented with normal affect. Attention, knowledge, memory, and language are intact with telephone .  No acute distress although sits up and lays down throughout the examination and interview.  Eyes: Sclerae are clear.  Respirations: Unlabored. CV: No lower extremity edema.     Gait:  Nonantalgic  Positive seated straight leg raise on the left  Sensation is decreased to light touch left lateral malleolus and dorsal left foot  Reflexes are   2+ patellar and Achilles with downgoing toes.    Manual muscle testing reveals:  Right /Left out of 5     5/5 hip flexors  5/5 knee flexors  5/5 knee extensors  5/5 ankle plantar flexors  5/5- ankle dorsiflexors  5/4  EHL

## 2023-01-20 ENCOUNTER — OFFICE VISIT (OUTPATIENT)
Dept: PHYSICAL MEDICINE AND REHAB | Facility: CLINIC | Age: 43
End: 2023-01-20
Payer: COMMERCIAL

## 2023-01-20 VITALS
DIASTOLIC BLOOD PRESSURE: 80 MMHG | HEIGHT: 67 IN | WEIGHT: 160 LBS | BODY MASS INDEX: 25.11 KG/M2 | SYSTOLIC BLOOD PRESSURE: 133 MMHG | HEART RATE: 100 BPM

## 2023-01-20 DIAGNOSIS — M51.26 LUMBAR DISC HERNIATION: Primary | ICD-10-CM

## 2023-01-20 DIAGNOSIS — M54.16 LUMBAR RADICULAR PAIN: ICD-10-CM

## 2023-01-20 PROCEDURE — 99214 OFFICE O/P EST MOD 30 MIN: CPT | Performed by: PHYSICIAN ASSISTANT

## 2023-01-20 RX ORDER — PREGABALIN 50 MG/1
50 CAPSULE ORAL 3 TIMES DAILY
Qty: 90 CAPSULE | Refills: 0 | Status: SHIPPED | OUTPATIENT
Start: 2023-01-20 | End: 2023-05-01

## 2023-01-20 ASSESSMENT — PAIN SCALES - GENERAL: PAINLEVEL: WORST PAIN (10)

## 2023-01-20 NOTE — LETTER
1/20/2023         RE: Beh G Dwe  [address] [place] [address]        Dear Colleague,    Thank you for referring your patient, Beh G Dwe, to the [facility]UROSUR[facility]ERY. Please see a copy of my visit note below.    Assessment:   Beh G Dwe is a 42 y.o. y.o. male with past medical history significant for nicotine dependence who presents today for follow-up regarding acute on chronic left low back pain with radiation into the left lower extremity with associated numbness, tingling, weakness.  My review of and MRI lumbar spine from [facility] dated December 9, 2022 shows that at L4-5 there is a caudally dissecting disc extrusion with left lateral recess stenosis and mass-effect on the left L5 nerve root.Patient is status post left L4-5, L5-S1 transforaminal epidural steroid injections January 4, 2023 which did not provide any relief of his pain.  For the past several days his pain is actually gotten worse.  On exam he had weakness in left EHL.  He had a sensory deficit left L5 dermatome.  Symptoms are consistent with left L5 radiculopathy.       Plan:     A shared decision making plan was used.  The patient's values and choices were respected.  The following represents what was discussed and decided upon by the physician assistant and the patient.  A telephone  is present for the visit.    1.  DIAGNOSTIC TESTS:  - I reviewed the MRI lumbar spine.  No further diagnostic tests were ordered.    2.  PHYSICAL THERAPY: Patient attended 5 sessions of physical therapy ending February 20, 2020.  - Patient would not tolerate physical therapy due to the severity of his pain.    3.  MEDICATIONS:  - I prescribed pregabalin 50 mg 3 times daily.  - Patient will take pregabalin instead of gabapentin.  He is taking 300 mg 3 times daily but he does not feel like it is helping and it is causing some dizziness.  - Patient has failed to have any relief of his pain with Tylenol, naproxen, hydrocodone,  oxycodone, tramadol, nabumetone, prednisone.  No additional opiates prescribed today.    4.  INTERVENTIONS: No additional interventions were ordered.    5.  REFERRALS: Patient is scheduled to see neurosurgery on January 26, 2023.    6.  FOLLOW-UP: Patient is going to follow-up with me as needed.  We will await recommendations from neurosurgery.  If he has questions or concerns, he should not hesitate to call.  Subjective:     Beh G Dwe is a 42 y.o. male who presents today for follow-up regarding an acute flare of chronic low back pain with radiation into the left lower extremity.  Patient is following up after left L4-5, L5-S1 transforaminal epidural steroid injections January 4, 2023.  Patient reports no improvement in his pain.  For the past several days his pain has been more severe.  Denies any injury or event to cause the increased pain.      Patient complains of left low back pain.  Pain radiates into the left buttock, down the left posterolateral thigh, into the lateral calf, wrapping around into the anterior ankle, dorsal foot, ending in the great toe.  He has numbness and tingling in the same distribution as his pain.  He feels weakness in the left leg with walking.  He denies loss of bowel or bladder control.  Denies recent fevers or chills.  He rates his pain today as a 10 out of 10.  Pain is aggravated with standing, sitting, walking.  Pain is alleviated with lying down.  He is not able to sleep because of the pain.    Patient attended 5 sessions of physical therapy ending February 20, 2020.  He is taking tramadol 50 mg 3 times daily, nabumetone 500 to 1000 mg 3 times daily, and gabapentin 300 mg 3 times daily.  These medicines are not helpful.  He is having dizziness with the gabapentin.  Oxycodone, hydrocodone, naproxen, Tylenol, and prednisone all failed to provide relief previously.      Review of Systems:  Positive for numbness/tingling, weakness, pain much worse at night.  Negative for loss of  bowel/bladder control, inability to urinate, headache, trip/stumble/falls, difficulty swallowing, difficulty with hand skills, fevers, unintentional weight loss.     Objective:   CONSTITUTIONAL:  Vital signs as above.  Patient appears to be in severe pain.  The patient is well nourished and well groomed.    PSYCHIATRIC:  The patient is awake, alert, oriented to person, place and time.  The patient is answering questions appropriately with clear speech.  Normal affect.  HEENT: Normocephalic, atraumatic.  Sclera clear.    SKIN:  Skin over the face, posterior torso, bilateral upper and lower extremities is clean, dry, intact without rashes.  VASCULAR: No significant lower extremity edema.  MUSCULOSKELETAL:  Gait is deferred.  The patient is able to rise tender to palpation palpation left lower lumbar paraspinous muscles.  4-/5 strength left EHL.  5/5 strength bilateral hip flexors, knee flexors/extensors, bilateral ankle dorsiflexors, bilateral ankle plantar flexors, right EHL.  NEUROLOGICAL: 2+ patellar and Achilles reflexes which are symmetric bilaterally.  Diminished sensation left L5 dermatome.      RESULTS:   I reviewed the MRI lumbar spine from [facility] dated December 9, 2022.  At L4-5 there is a caudally dissecting disc extrusion with left lateral recess stenosis and mass-effect on the left L5 nerve root.        Again, thank you for allowing me to participate in the care of your patient.        Sincerely,        Nikia Saleh PA-C

## 2023-01-25 ENCOUNTER — HOSPITAL ENCOUNTER (OUTPATIENT)
Dept: RADIOLOGY | Facility: HOSPITAL | Age: 43
Discharge: HOME | End: 2023-01-25
Attending: SURGERY | Admitting: SURGERY
Payer: COMMERCIAL

## 2023-01-25 DIAGNOSIS — M54.16 LUMBAR RADICULOPATHY: ICD-10-CM

## 2023-01-25 PROCEDURE — 72110 X-RAY EXAM L-2 SPINE 4/>VWS: CPT

## 2023-01-26 ENCOUNTER — LAB (OUTPATIENT)
Dept: LAB | Facility: HOSPITAL | Age: 43
End: 2023-01-26
Payer: COMMERCIAL

## 2023-01-26 ENCOUNTER — PREP FOR PROCEDURE (OUTPATIENT)
Dept: NEUROSURGERY | Facility: CLINIC | Age: 43
End: 2023-01-26

## 2023-01-26 ENCOUNTER — OFFICE VISIT (OUTPATIENT)
Dept: NEUROSURGERY | Facility: CLINIC | Age: 43
End: 2023-01-26
Attending: PHYSICAL MEDICINE & REHABILITATION
Payer: COMMERCIAL

## 2023-01-26 VITALS
DIASTOLIC BLOOD PRESSURE: 84 MMHG | SYSTOLIC BLOOD PRESSURE: 139 MMHG | BODY MASS INDEX: 25.11 KG/M2 | OXYGEN SATURATION: 97 % | HEART RATE: 101 BPM | WEIGHT: 160 LBS | HEIGHT: 67 IN

## 2023-01-26 DIAGNOSIS — M54.16 LUMBAR RADICULOPATHY: Primary | ICD-10-CM

## 2023-01-26 DIAGNOSIS — Z01.818 PRE-OP TESTING: ICD-10-CM

## 2023-01-26 DIAGNOSIS — M51.26 LUMBAR DISC HERNIATION: ICD-10-CM

## 2023-01-26 DIAGNOSIS — M54.16 LUMBAR RADICULOPATHY: ICD-10-CM

## 2023-01-26 DIAGNOSIS — Z01.818 PRE-OP TESTING: Primary | ICD-10-CM

## 2023-01-26 LAB
ANION GAP SERPL CALCULATED.3IONS-SCNC: 9 MMOL/L (ref 7–15)
APTT PPP: 26 SECONDS (ref 22–38)
BUN SERPL-MCNC: 10.3 MG/DL (ref 6–20)
CALCIUM SERPL-MCNC: 9.8 MG/DL (ref 8.6–10)
CHLORIDE SERPL-SCNC: 105 MMOL/L (ref 98–107)
CREAT SERPL-MCNC: 0.9 MG/DL (ref 0.67–1.17)
DEPRECATED HCO3 PLAS-SCNC: 28 MMOL/L (ref 22–29)
ERYTHROCYTE [DISTWIDTH] IN BLOOD BY AUTOMATED COUNT: 13.2 % (ref 10–15)
GFR SERPL CREATININE-BSD FRML MDRD: >90 ML/MIN/1.73M2
GLUCOSE SERPL-MCNC: 78 MG/DL (ref 70–99)
HCT VFR BLD AUTO: 46.1 % (ref 40–53)
HGB BLD-MCNC: 15.1 G/DL (ref 13.3–17.7)
INR PPP: 0.92 (ref 0.85–1.15)
MCH RBC QN AUTO: 29.7 PG (ref 26.5–33)
MCHC RBC AUTO-ENTMCNC: 32.8 G/DL (ref 31.5–36.5)
MCV RBC AUTO: 91 FL (ref 78–100)
PLATELET # BLD AUTO: 266 10E3/UL (ref 150–450)
POTASSIUM SERPL-SCNC: 3.8 MMOL/L (ref 3.4–5.3)
RBC # BLD AUTO: 5.08 10E6/UL (ref 4.4–5.9)
SODIUM SERPL-SCNC: 142 MMOL/L (ref 136–145)
WBC # BLD AUTO: 8.4 10E3/UL (ref 4–11)

## 2023-01-26 PROCEDURE — 85730 THROMBOPLASTIN TIME PARTIAL: CPT

## 2023-01-26 PROCEDURE — 85027 COMPLETE CBC AUTOMATED: CPT

## 2023-01-26 PROCEDURE — 80048 BASIC METABOLIC PNL TOTAL CA: CPT

## 2023-01-26 PROCEDURE — 85610 PROTHROMBIN TIME: CPT

## 2023-01-26 PROCEDURE — 99204 OFFICE O/P NEW MOD 45 MIN: CPT | Performed by: SURGERY

## 2023-01-26 PROCEDURE — 36415 COLL VENOUS BLD VENIPUNCTURE: CPT

## 2023-01-26 NOTE — PATIENT INSTRUCTIONS
Left lumbar 4-lumbar 5 hemilaminectomy, medial facetectomy, foraminotomy, and microdiscectomy      Please review COMPLETE information about your proposed surgery, pre-operative requirements, post-operative course and expectations - available in a folder provided to you in clinic!    Your surgery scheduler will call you within 3 business days to begin the process of scheduling your surgery and appointments.     Pre-Operative    Pre-operative physical / Lab work with primary care physician within 30 days of surgical date. We prefer the pre-op exam to be done 2 weeks prior to surgery. We also prefer pre-op lab work be done at [facility] or [facility] outpatient lab, 2 weeks prior to surgery.     If all pre-op appointments/test are not completed prior to your surgery date, you will be asked to reschedule your surgery.           As part of preparation for your upcoming procedure your primary care doctor may order a test to rule out a COVID-19 infection. This is no longer a requirement prior to surgery.     Readiness Visits    Prior to surgery, you may have a telephone or in person readiness visit with our RN team to discuss your upcomming surgery, results of your pre-op physical, and lab work.   If you will require a collar/neck brace after surgery, you will be fitted for one at your readiness visit prior to surgery (scheduled by the surgery scheduler).     Shower procedure    Hibiclens shower: Use one packet the night before surgery and one packet the morning of surgery for a whole body shower. Avoid face, hair, and genitals.      Eating/Drinking    Stop all solid foods 8 hours before surgery.  Stop all clear liquids 2 hours prior to arrival time     Clear liquids include water, clear juice, black coffee, or clear tea without milk, Gatorade, clear soda.     Medications - please refer to the pre-operative medication instructions sheet in your folder    Hold Aspirin, NSAIDs (Advil/Ibuprofen, Indocin,  Naproxen,Nuprin,Relafen/Nabumetone, Diclofenac,Meloxicam, Aleve, Celebrex) and all vitamins and supplements x 7-10 days prior to surgical date  You can take Tylenol (Acetaminophen) for pain up until the date of your surgery   Do not exceed 3,000 mg per day   Any other medications prescribed, please discuss with your primary care provider at your pre-operative physical. Please discuss when/if it is safe for you to stop taking blood thinners with your primary care provider.   We will NOT provide pain medications prior to surgery. We will prescribe post-op pain medications for up to 6 weeks after surgery.       FMLA/Short-term disability    If you are currently employed, you will likely need to be off work for 4-6 weeks for post-op recovery and healing.  Please fax any FMLA/short term disability paperwork to [phone], mail it into the clinic, drop it off in person, or send via a [employer] message.   You may also call our clinic with the date in which you'd like to return to work, and we can provide a work letter to your employer  We will support Short-Term Disability up to 12 weeks, beginning the date of your surgery. We do NOT support Long-Term Disability/Social Security Benefits.     Pain Management after surgery    Dealing with pain    As your body heals, you might feel a stabbing, burning, or aching pain. You may also have some numbness.  Everyone feels pain differently, we may ask you to rate your pain using a pain scale. This will let us know how much pain you feel.   Keep in mind that medicine won't take away all of your pain. It helps to try other ways to relax and ease pain.     Things to help with pain    After surgery, we will give you medicine for your pain. These medications work well, but they can make you drowsy, itchy, or sick to your stomach, and constipated. Try to take narcotics with food if they cause nausea.   For mild to moderate pain, you can take medication such as Tylenol or Ibuprofen. These  can be used with narcotics and muscle relaxants. *If you have had a fusion: do NOT use NSAIDs for 6 months after surgery.   Do NOT drive while taking narcotic pain medication  Do NOT drink alcohol while using narcotic pain medication  You can utilize ice as needed (no longer than 20 minutes at one time) you may apply this over your covered incision  Utilize heat for muscle spasms, do not apply heat over your incision  If a muscle relaxer is prescribed, please do NOT take it at the same time as your narcotic pain medication. Take them at least 90 minutes apart.   You may also use pain cream/patches on sore muscles. Do NOT apply these on your incision. Patches may be cut in 1/2 if needed.     *After your surgery, if you will be staying in-patient, a nursing team will be monitoring you closely throughout your stay and communicate your health status to your surgeon and other providers.  You will be seen by Advanced Practice Providers (e.g., nurse practitioners, clinic nurse specialists, and physician assistants) who will check on you regularly to assess the status of your surgical recovery.     Incision Care    Look at your incision site every day. You  may need a mirror, or family member to help you.   Do not submerge your incision in water such as pools, hot tubs, baths for at least 6 weeks or until incision is healed  You may get your incision wet in the shower. Allow water and soap to run over incision, and gently pat dry.   Remove the dressing the day after you are discharged from the hospital. Keep the incision clean and dry at all times. This may require several bandage changes.   Contact us right away if you have:   Fever over 101 degrees farenheit  Green or yellow drainage (pus) from your incision or increased bloody drainage   Redness, swelling, or warmth at your surgery site   Notify the clinic [phone].    Activity Restrictions    For the first 6 weeks, no lifting,pushing, or pulling > 5-10 pounds, no  bending, twisting.  Use the stairs in moderation   Walking: Walking is the best way to start exercise after surgery. Take short frequent walks. You may gradually increase the distance as tolerated. If you feel pain, decrease your activity, but DO NOT stop walking. Walking will help you regain strength.  Avoid prolonged positioning for longer than 30 minutes (change positions frequently while awake)  No contact sports until after follow up visit  No high impact activities such as; running/jogging, snowmobile or 4 gomez riding or any other recreational vehicles until deemed safe by your surgeon/care team.   Please call the clinic if you develop any of the following symptoms:  Swelling and/or warmth in one or both legs  Pain or tenderness in your leg, ankle, foot, or arm   Red or discolored/pale skin     Post-Op Follow Up Appointments    We will call you to schedule these appointments after your discharge from the hospital.   Incision assessment within 2 weeks with a Registered Nurse   6 week post-op with a Nurse Practitioner/Physician Assistant. Your surgeon will be available on this day.

## 2023-01-26 NOTE — LETTER
1/26/2023         RE: Beh G Dwe  [address]  [place] [address]        Dear Colleague,    Thank you for referring your patient, Beh G Dwe, to the [facility] SPINE AND NEUROSURGERY. Please see a copy of my visit note below.    NEUROSURGERY CONSULTATION NOTE      Neurosurgery was asked to see this patient by Pavan Love DO for evaluation of lumbar radiuclopathy.       CONSULTATION ASSESSMENT AND PLAN:    41 yo male who presents with low back and left leg pain, numbness, tingling and weakness. Lumbar xray shows bilateral L5 pars defects and retrolisthesis of lumbar 3-4 and lumbar 4-5 as well as anterolisthesis of lumbar 5-sacral 1 all of which do not have significant motion on dynamic xray's. MRI of lumbar spine shows left paracentral disc extrusion which causes left lateral recess stenosis and L5 nerve impingement. Also has right lumbar 5-sacral 1 foraminal narrowing. Recommend left lumbar 4-lumbar 5 hemilaminectomy, medial facetectomy, foraminotomy, and microdiscectomy. Risks and benefits of lumbar decompression discussed including but not limited to infection, hematoma, nerve damage including paralysis, recurrent disc herniation, post op radiculitis, durotomy, risks associated with the use of general anesthesia, blood clots in the lungs or legs. He agreed to proceed.     Marlena Zuniga MD      HPI:  41 yo male who presents with low back and left leg pain, numbness, tingling and weakness. Chronic for many years. Pain and numbness and tingling travel down posterolateral leg. Weakness is diffuse and pain related. Denies focal weakness. Has tried multiple conservative management including physical therapy, medications, and injections without long-term relief.     Past Medical History:   Diagnosis Date     Tuberculosis     latent TB.  completed a course of INR in 2010       No past surgical history on file.    REVIEW OF SYSTEMS:  See ROS form under media     MEDICATIONS:  Current Outpatient  "Medications   Medication Sig Dispense Refill     acetaminophen (TYLENOL) 500 MG tablet Take 2 tablets (1,000 mg) by mouth 3 times daily as needed for pain 100 tablet 4     fluticasone propionate (FLONASE) 50 mcg/actuation nasal spray [FLUTICASONE PROPIONATE (FLONASE) 50 MCG/ACTUATION NASAL SPRAY] 1 spray into each nostril 2 times a day at 6:00 am and 4:00 pm. 18 g 2     gabapentin (NEURONTIN) 100 MG capsule Take 1 capsule (100 mg) by mouth 3 times daily 120 capsule 3     gabapentin (NEURONTIN) 300 MG capsule Take 1 capsule (300 mg) by mouth 3 times daily for 10 days 30 capsule 0     nabumetone (RELAFEN) 500 MG tablet Take 1-2 tablets (500-1,000 mg) by mouth 2 times daily as needed for moderate pain (4-6) 90 tablet 1     naproxen (NAPROSYN) 500 MG tablet Take 1 tablet (500 mg) by mouth 2 times daily (with meals) 60 tablet 3     predniSONE (DELTASONE) 20 MG tablet Take two tablets (= 40mg) each day for 5 (five) days 10 tablet 0     pregabalin (LYRICA) 50 MG capsule Take 1 capsule (50 mg) by mouth 3 times daily 90 capsule 0         ALLERGIES/SENSITIVITIES:     No Known Allergies    PERTINENT SOCIAL HISTORY:   Social History     Socioeconomic History     Marital status:    Tobacco Use     Smoking status: Every Day     Types: Cigarettes     Smokeless tobacco: Current     Tobacco comments:     chews betel nut w/o tobacco   Substance and Sexual Activity     Alcohol use: Yes     Comment: Alcoholic Drinks/day: sometimes     Drug use: Never     Sexual activity: Yes     Partners: Female         FAMILY HISTORY:  No family history on file.     PHYSICAL EXAM:   Constitutional: /84   Pulse 101   Ht 5' 7\" (1.702 m)   Wt 160 lb (72.6 kg)   SpO2 97%   BMI 25.06 kg/m       Mental Status: A & O in no acute distress.  Affect is appropriate.  Speech is fluent.  Recent and remote memory are intact.  Attention span and concentration are normal.     Motor: Normal bulk and tone all muscle groups of upper and lower " extremities.    Strength: 5/5 except left EHL DF 4/5 and HF appears pain limited 4/5     Sensory: Sensation intact bilaterally to light touch except diminished in the left L5 distribution     Coordination antalgic gait      Reflexes; supinator, biceps, triceps, knee/ ankle jerk intact 2+/4. No borjas's    IMAGING:  I personally reviewed all radiographic images       Cc:   Sonia Wu Tac               Again, thank you for allowing me to participate in the care of your patient.        Sincerely,        Marlena Zuniga MD

## 2023-01-26 NOTE — NURSING NOTE
Neurosurgery consultation was requested by: Dr. Love   Pain: low back pain   Radicular Pain is present: left posterior leg pain that wraps around knee and down anterior shin into foot   Lhermitte sign: no   Motor complaints: left leg weakness  Sensory complaints: numbness in left leg   Gait and balance issues: yes   Bowel or bladder issues: denies   Duration of SX is: 5-6 years, but feel recently and pain became worse after  The symptoms are worse with: constant   The symptoms are better with: nothing   Injury: denies   Severity is: chronic   Patient has tried the following conservative measures: yes and gave relief for short period of time.   HOMAR score is:  RADHA Rahman

## 2023-01-26 NOTE — PROGRESS NOTES
NEUROSURGERY CONSULTATION NOTE      Neurosurgery was asked to see this patient by Pavna Love DO for evaluation of lumbar radiuclopathy.       CONSULTATION ASSESSMENT AND PLAN:    43 yo male who presents with low back and left leg pain, numbness, tingling and weakness. Lumbar xray shows bilateral L5 pars defects and retrolisthesis of lumbar 3-4 and lumbar 4-5 as well as anterolisthesis of lumbar 5-sacral 1 all of which do not have significant motion on dynamic xray's. MRI of lumbar spine shows left paracentral disc extrusion which causes left lateral recess stenosis and L5 nerve impingement. Also has right lumbar 5-sacral 1 foraminal narrowing. Recommend left lumbar 4-lumbar 5 hemilaminectomy, medial facetectomy, foraminotomy, and microdiscectomy. Risks and benefits of lumbar decompression discussed including but not limited to infection, hematoma, nerve damage including paralysis, recurrent disc herniation, post op radiculitis, durotomy, risks associated with the use of general anesthesia, blood clots in the lungs or legs. He agreed to proceed.     Marlena Zuniga MD      HPI:  43 yo male who presents with low back and left leg pain, numbness, tingling and weakness. Chronic for many years. Pain and numbness and tingling travel down posterolateral leg. Weakness is diffuse and pain related. Denies focal weakness. Has tried multiple conservative management including physical therapy, medications, and injections without long-term relief.     Past Medical History:   Diagnosis Date     Tuberculosis     latent TB.  completed a course of INR in 2010       No past surgical history on file.    REVIEW OF SYSTEMS:  See ROS form under media     MEDICATIONS:  Current Outpatient Medications   Medication Sig Dispense Refill     acetaminophen (TYLENOL) 500 MG tablet Take 2 tablets (1,000 mg) by mouth 3 times daily as needed for pain 100 tablet 4     fluticasone propionate (FLONASE) 50 mcg/actuation nasal spray  "[FLUTICASONE PROPIONATE (FLONASE) 50 MCG/ACTUATION NASAL SPRAY] 1 spray into each nostril 2 times a day at 6:00 am and 4:00 pm. 18 g 2     gabapentin (NEURONTIN) 100 MG capsule Take 1 capsule (100 mg) by mouth 3 times daily 120 capsule 3     gabapentin (NEURONTIN) 300 MG capsule Take 1 capsule (300 mg) by mouth 3 times daily for 10 days 30 capsule 0     nabumetone (RELAFEN) 500 MG tablet Take 1-2 tablets (500-1,000 mg) by mouth 2 times daily as needed for moderate pain (4-6) 90 tablet 1     naproxen (NAPROSYN) 500 MG tablet Take 1 tablet (500 mg) by mouth 2 times daily (with meals) 60 tablet 3     predniSONE (DELTASONE) 20 MG tablet Take two tablets (= 40mg) each day for 5 (five) days 10 tablet 0     pregabalin (LYRICA) 50 MG capsule Take 1 capsule (50 mg) by mouth 3 times daily 90 capsule 0         ALLERGIES/SENSITIVITIES:     No Known Allergies    PERTINENT SOCIAL HISTORY:   Social History     Socioeconomic History     Marital status:    Tobacco Use     Smoking status: Every Day     Types: Cigarettes     Smokeless tobacco: Current     Tobacco comments:     chews betel nut w/o tobacco   Substance and Sexual Activity     Alcohol use: Yes     Comment: Alcoholic Drinks/day: sometimes     Drug use: Never     Sexual activity: Yes     Partners: Female         FAMILY HISTORY:  No family history on file.     PHYSICAL EXAM:   Constitutional: /84   Pulse 101   Ht 5' 7\" (1.702 m)   Wt 160 lb (72.6 kg)   SpO2 97%   BMI 25.06 kg/m       Mental Status: A & O in no acute distress.  Affect is appropriate.  Speech is fluent.  Recent and remote memory are intact.  Attention span and concentration are normal.     Motor: Normal bulk and tone all muscle groups of upper and lower extremities.    Strength: 5/5 except left EHL DF 4/5 and HF appears pain limited 4/5     Sensory: Sensation intact bilaterally to light touch except diminished in the left L5 distribution     Coordination antalgic gait      Reflexes; " supinator, biceps, triceps, knee/ ankle jerk intact 2+/4. No borjas's    IMAGING:  I personally reviewed all radiographic images       Cc:   Sonia Wu Tac

## 2023-01-30 ENCOUNTER — TELEPHONE (OUTPATIENT)
Dept: NEUROSURGERY | Facility: CLINIC | Age: 43
End: 2023-01-30
Payer: COMMERCIAL

## 2023-01-30 NOTE — TELEPHONE ENCOUNTER
Patient is scheduled for surgery on 2/17/23. I gave the patient surgery details using an , and the patient verbalized understanding.

## 2023-02-01 ENCOUNTER — OFFICE VISIT (OUTPATIENT)
Dept: FAMILY MEDICINE | Facility: CLINIC | Age: 43
End: 2023-02-01
Payer: COMMERCIAL

## 2023-02-01 VITALS
DIASTOLIC BLOOD PRESSURE: 78 MMHG | OXYGEN SATURATION: 100 % | WEIGHT: 164 LBS | TEMPERATURE: 97.9 F | RESPIRATION RATE: 16 BRPM | SYSTOLIC BLOOD PRESSURE: 138 MMHG | HEART RATE: 90 BPM | HEIGHT: 66 IN | BODY MASS INDEX: 26.36 KG/M2

## 2023-02-01 DIAGNOSIS — G89.29 CHRONIC LEFT-SIDED LOW BACK PAIN WITH LEFT-SIDED SCIATICA: ICD-10-CM

## 2023-02-01 DIAGNOSIS — K64.8 INTERNAL HEMORRHOID: ICD-10-CM

## 2023-02-01 DIAGNOSIS — M51.369 DDD (DEGENERATIVE DISC DISEASE), LUMBAR: ICD-10-CM

## 2023-02-01 DIAGNOSIS — E78.5 HYPERLIPIDEMIA LDL GOAL <130: ICD-10-CM

## 2023-02-01 DIAGNOSIS — F17.210 CIGARETTE NICOTINE DEPENDENCE WITHOUT COMPLICATION: ICD-10-CM

## 2023-02-01 DIAGNOSIS — M47.816 LUMBAR FACET ARTHROPATHY: ICD-10-CM

## 2023-02-01 DIAGNOSIS — Z01.818 PREOP EXAMINATION: Primary | ICD-10-CM

## 2023-02-01 DIAGNOSIS — M54.42 CHRONIC LEFT-SIDED LOW BACK PAIN WITH LEFT-SIDED SCIATICA: ICD-10-CM

## 2023-02-01 PROCEDURE — 99214 OFFICE O/P EST MOD 30 MIN: CPT | Performed by: FAMILY MEDICINE

## 2023-02-01 RX ORDER — POLYETHYLENE GLYCOL 3350 17 G
2 POWDER IN PACKET (EA) ORAL
Qty: 100 LOZENGE | Refills: 3 | Status: SHIPPED | OUTPATIENT
Start: 2023-02-01 | End: 2023-05-01

## 2023-02-01 RX ORDER — ATORVASTATIN CALCIUM 20 MG/1
20 TABLET, FILM COATED ORAL AT BEDTIME
Qty: 90 TABLET | Refills: 3 | Status: SHIPPED | OUTPATIENT
Start: 2023-02-01 | End: 2023-05-01

## 2023-02-01 NOTE — PROGRESS NOTES
[address]  [place] [address]  Phone: [phone]  Fax: [phone]  Primary Provider: Thien Chaudhari  Pre-op Performing Provider:    THIEN CHAUDHARI  VIDEO,       PREOPERATIVE EVALUATION:  Today's date: 2/1/2023    Beh G Dwe is a 42 year old male who presents for a preoperative evaluation.    Surgical Information:  Surgery/Procedure: left lumbar 4-lumbar 5 hemilaminectomy, medial facetectomy, foraminotomy, and microdiscectomy.  Surgery Location: [facility]  Surgeon: Dr. Marlena Zuniga  Surgery Date: 02/17/2023  Time of Surgery: 7:30am  Where patient plans to recover: At home with family  Fax number for surgical facility: Note does not need to be faxed, will be available electronically in Epic.    Type of Anesthesia Anticipated: General    Assessment & Plan     The proposed surgical procedure is considered LOW risk.    Preop examination  DDD (degenerative disc disease), lumbar  Chronic left-sided low back pain with left-sided sciatica  Lumbar facet arthropathy  Ok to proceed without further testing. Labs done by neurosurgery.   All questions answered.   Hold naproxen 4 days prior to surgery, continue all other medicaitons.     Internal hemorrhoid  1 year of blood in stool - streaking on the side, intermittent. Visible hemorrhoid. Visible and palpable on exam, <1cm. Will start with symptom treatment, if no improvement consider colorectal referral.   - phenylephrine-cocoa butter (PREPARATION H) 0.25-88.44 % suppository  Dispense: 24 suppository; Refill: 3  - phenylephrine-shark liver oil-mineral oil-petrolatum (PREPARATION H) 0.25-3-14-71.9 % rectal ointment  Dispense: 57 g; Refill: 3    Cigarette nicotine dependence without complication  10 cigarettes per day. Can consider wellbutrin to help with cessation, but I hesitate to start new medicine less than 2 weeks before his surgery. He has some improvement with NRT, will trial that for now, revisit  "wellbutrin vs chantix at next visit in 2-3 months.   - nicotine (COMMIT) 2 MG lozenge  Dispense: 100 lozenge; Refill: 3    Hyperlipidemia LDL goal <130  ascvd risk score >11.5%. patient would like to consider a statin, will start today and follow up in 2-3 months.   - atorvastatin (LIPITOR) 20 MG tablet  Dispense: 90 tablet; Refill: 3            Medication Instructions:   - naproxen (Aleve, Naprosyn): HOLD 4 days before surgery.     RECOMMENDATION:  APPROVAL GIVEN to proceed with proposed procedure, without further diagnostic evaluation.      Subjective     HPI related to upcoming procedure:   From neurosurgery notes:   \"43 yo male who presents with low back and left leg pain, numbness, tingling and weakness. Lumbar xray shows bilateral L5 pars defects and retrolisthesis of lumbar 3-4 and lumbar 4-5 as well as anterolisthesis of lumbar 5-sacral 1 all of which do not have significant motion on dynamic xray's. MRI of lumbar spine shows left paracentral disc extrusion which causes left lateral recess stenosis and L5 nerve impingement. Also has right lumbar 5-sacral 1 foraminal narrowing. Recommend left lumbar 4-lumbar 5 hemilaminectomy, medial facetectomy, foraminotomy, and microdiscectomy. Risks and benefits of lumbar decompression discussed\"      Preop Questions 2/1/2023   1. Have you ever had a heart attack or stroke? No   2. Have you ever had surgery on your heart or blood vessels, such as a stent placement, a coronary artery bypass, or surgery on an artery in your head, neck, heart, or legs? No   3. Do you have chest pain with activity? No   4. Do you have a history of  heart failure? No   5. Do you currently have a cold, bronchitis or symptoms of other infection? No   6. Do you have a cough, shortness of breath, or wheezing? No   7. Do you or anyone in your family have previous history of blood clots? No   8. Do you or does anyone in your family have a serious bleeding problem such as prolonged bleeding following " surgeries or cuts? No   9. Have you ever had problems with anemia or been told to take iron pills? No   10. Have you had any abnormal blood loss such as black, tarry or bloody stools? No   11. Have you ever had a blood transfusion? No   12. Are you willing to have a blood transfusion if it is medically needed before, during, or after your surgery? Yes   13. Have you or any of your relatives ever had problems with anesthesia? No   14. Do you have sleep apnea, excessive snoring or daytime drowsiness? No   15. Do you have any artifical heart valves or other implanted medical devices like a pacemaker, defibrillator, or continuous glucose monitor? No   16. Do you have artificial joints? No   17. Are you allergic to latex? No     Health Care Directive:  Patient does not have a Health Care Directive or Living Will: Discussed advance care planning with patient; however, patient declined at this time.    Preoperative Review of :   reviewed - no record of controlled substances prescribed.          Review of Systems  CONSTITUTIONAL: NEGATIVE for fever, chills, change in weight  INTEGUMENTARY/SKIN: NEGATIVE for worrisome rashes, moles or lesions  EYES: NEGATIVE for vision changes or irritation  ENT/MOUTH: NEGATIVE for ear, mouth and throat problems  RESP: NEGATIVE for significant cough or SOB  CV: NEGATIVE for chest pain, palpitations or peripheral edema  GI: NEGATIVE for nausea, abdominal pain, heartburn, or change in bowel habits  : NEGATIVE for frequency, dysuria, or hematuria  MUSCULOSKELETAL: NEGATIVE for significant arthralgias or myalgia  NEURO: NEGATIVE for weakness, dizziness or paresthesias  ENDOCRINE: NEGATIVE for temperature intolerance, skin/hair changes  HEME: NEGATIVE for bleeding problems  PSYCHIATRIC: NEGATIVE for changes in mood or affect    Patient Active Problem List    Diagnosis Date Noted     DDD (degenerative disc disease), lumbar 02/01/2023     Priority: Medium     Chronic left-sided low back  pain with left-sided sciatica 02/01/2023     Priority: Medium     Lumbar facet arthropathy 02/01/2023     Priority: Medium     Nicotine Dependence      Priority: Medium     Created by [facility] Annotation: Apr 2 2010  5:14PM -  ,  : smokes 5 cig/day          Past Medical History:   Diagnosis Date     Tuberculosis     latent TB.  completed a course of INR in 2010     History reviewed. No pertinent surgical history.  Current Outpatient Medications   Medication Sig Dispense Refill     acetaminophen (TYLENOL) 500 MG tablet Take 2 tablets (1,000 mg) by mouth 3 times daily as needed for pain 100 tablet 4     atorvastatin (LIPITOR) 20 MG tablet Take 1 tablet (20 mg) by mouth At Bedtime 90 tablet 3     fluticasone propionate (FLONASE) 50 mcg/actuation nasal spray [FLUTICASONE PROPIONATE (FLONASE) 50 MCG/ACTUATION NASAL SPRAY] 1 spray into each nostril 2 times a day at 6:00 am and 4:00 pm. 18 g 2     gabapentin (NEURONTIN) 100 MG capsule Take 1 capsule (100 mg) by mouth 3 times daily 120 capsule 3     nabumetone (RELAFEN) 500 MG tablet Take 1-2 tablets (500-1,000 mg) by mouth 2 times daily as needed for moderate pain (4-6) 90 tablet 1     naproxen (NAPROSYN) 500 MG tablet Take 1 tablet (500 mg) by mouth 2 times daily (with meals) 60 tablet 3     nicotine (COMMIT) 2 MG lozenge Place 1 lozenge (2 mg) inside cheek every hour as needed for smoking cessation 100 lozenge 3     phenylephrine-cocoa butter (PREPARATION H) 0.25-88.44 % suppository Place 1 suppository rectally as needed for hemorrhoids or itching 24 suppository 3     phenylephrine-shark liver oil-mineral oil-petrolatum (PREPARATION H) 0.25-3-14-71.9 % rectal ointment Place rectally 2 times daily as needed for hemorrhoids 57 g 3     pregabalin (LYRICA) 50 MG capsule Take 1 capsule (50 mg) by mouth 3 times daily 90 capsule 0       No Known Allergies     Social History     Tobacco Use     Smoking status: Every Day     Types: Cigarettes     Smokeless  "tobacco: Current     Tobacco comments:     chews betel nut w/o tobacco   Substance Use Topics     Alcohol use: Yes     Comment: Alcoholic Drinks/day: sometimes       History   Drug Use Unknown         Objective     /78   Pulse 90   Temp 97.9  F (36.6  C) (Oral)   Resp 16   Ht 1.671 m (5' 5.79\")   Wt 74.4 kg (164 lb)   SpO2 100%   BMI 26.64 kg/m      Physical Exam    GENERAL APPEARANCE: healthy, alert and no distress     EYES: EOMI,  PERRL     HENT: ear canals and TM's normal and nose and mouth without ulcers or lesions     NECK: no adenopathy, no asymmetry, masses, or scars and thyroid normal to palpation     RESP: lungs clear to auscultation - no rales, rhonchi or wheezes     CV: regular rates and rhythm, normal S1 S2, no S3 or S4 and no murmur, click or rub     ABDOMEN:  soft, nontender, no HSM or masses and bowel sounds normal     MS: extremities normal- no gross deformities noted, no evidence of inflammation in joints, FROM in all extremities.     SKIN: no suspicious lesions or rashes     NEURO: Normal strength and tone, sensory exam grossly normal, mentation intact and speech normal     PSYCH: mentation appears normal. and affect normal/bright     LYMPHATICS: No cervical adenopathy    Recent Labs   Lab Test 01/26/23  0915 12/07/22  1223   HGB 15.1 15.2    251   INR 0.92  --     141   POTASSIUM 3.8 3.6   CR 0.90 0.98        Diagnostics:  Recent Results (from the past 504 hour(s))   CBC with platelets    Collection Time: 01/26/23  9:15 AM   Result Value Ref Range    WBC Count 8.4 4.0 - 11.0 10e3/uL    RBC Count 5.08 4.40 - 5.90 10e6/uL    Hemoglobin 15.1 13.3 - 17.7 g/dL    Hematocrit 46.1 40.0 - 53.0 %    MCV 91 78 - 100 fL    MCH 29.7 26.5 - 33.0 pg    MCHC 32.8 31.5 - 36.5 g/dL    RDW 13.2 10.0 - 15.0 %    Platelet Count 266 150 - 450 10e3/uL   Basic metabolic panel  (Ca, Cl, CO2, Creat, Gluc, K, Na, BUN)    Collection Time: 01/26/23  9:15 AM   Result Value Ref Range    Sodium 142 136 " - 145 mmol/L    Potassium 3.8 3.4 - 5.3 mmol/L    Chloride 105 98 - 107 mmol/L    Carbon Dioxide (CO2) 28 22 - 29 mmol/L    Anion Gap 9 7 - 15 mmol/L    Urea Nitrogen 10.3 6.0 - 20.0 mg/dL    Creatinine 0.90 0.67 - 1.17 mg/dL    Calcium 9.8 8.6 - 10.0 mg/dL    Glucose 78 70 - 99 mg/dL    GFR Estimate >90 >60 mL/min/1.73m2   INR    Collection Time: 01/26/23  9:15 AM   Result Value Ref Range    INR 0.92 0.85 - 1.15   Partial thromboplastin time    Collection Time: 01/26/23  9:15 AM   Result Value Ref Range    aPTT 26 22 - 38 Seconds       No results found for this or any previous visit (from the past 24 hour(s)).   No EKG required, no history of coronary heart disease, significant arrhythmia, peripheral arterial disease or other structural heart disease.    Revised Cardiac Risk Index (RCRI):  The patient has the following serious cardiovascular risks for perioperative complications:   - No serious cardiac risks = 0 points     RCRI Interpretation: 0 points: Class I (very low risk - 0.4% complication rate)           Signed Electronically by: Sonia Wu MD  Copy of this evaluation report is provided to requesting physician.

## 2023-02-01 NOTE — H&P (VIEW-ONLY)
[address]  [place] [address]  Phone: [phone]  Fax: [phone]  Primary Provider: Thien Chaudhari  Pre-op Performing Provider:    THIEN CHAUDHARI  VIDEO,       PREOPERATIVE EVALUATION:  Today's date: 2/1/2023    Beh G Dwe is a 42 year old male who presents for a preoperative evaluation.    Surgical Information:  Surgery/Procedure: left lumbar 4-lumbar 5 hemilaminectomy, medial facetectomy, foraminotomy, and microdiscectomy.  Surgery Location: [facility]  Surgeon: Dr. Marlena Zuniga  Surgery Date: 02/17/2023  Time of Surgery: 7:30am  Where patient plans to recover: At home with family  Fax number for surgical facility: Note does not need to be faxed, will be available electronically in Epic.    Type of Anesthesia Anticipated: General    Assessment & Plan     The proposed surgical procedure is considered LOW risk.    Preop examination  DDD (degenerative disc disease), lumbar  Chronic left-sided low back pain with left-sided sciatica  Lumbar facet arthropathy  Ok to proceed without further testing. Labs done by neurosurgery.   All questions answered.   Hold naproxen 4 days prior to surgery, continue all other medicaitons.     Internal hemorrhoid  1 year of blood in stool - streaking on the side, intermittent. Visible hemorrhoid. Visible and palpable on exam, <1cm. Will start with symptom treatment, if no improvement consider colorectal referral.   - phenylephrine-cocoa butter (PREPARATION H) 0.25-88.44 % suppository  Dispense: 24 suppository; Refill: 3  - phenylephrine-shark liver oil-mineral oil-petrolatum (PREPARATION H) 0.25-3-14-71.9 % rectal ointment  Dispense: 57 g; Refill: 3    Cigarette nicotine dependence without complication  10 cigarettes per day. Can consider wellbutrin to help with cessation, but I hesitate to start new medicine less than 2 weeks before his surgery. He has some improvement with NRT, will trial that for now, revisit  "wellbutrin vs chantix at next visit in 2-3 months.   - nicotine (COMMIT) 2 MG lozenge  Dispense: 100 lozenge; Refill: 3    Hyperlipidemia LDL goal <130  ascvd risk score >11.5%. patient would like to consider a statin, will start today and follow up in 2-3 months.   - atorvastatin (LIPITOR) 20 MG tablet  Dispense: 90 tablet; Refill: 3            Medication Instructions:   - naproxen (Aleve, Naprosyn): HOLD 4 days before surgery.     RECOMMENDATION:  APPROVAL GIVEN to proceed with proposed procedure, without further diagnostic evaluation.      Subjective     HPI related to upcoming procedure:   From neurosurgery notes:   \"41 yo male who presents with low back and left leg pain, numbness, tingling and weakness. Lumbar xray shows bilateral L5 pars defects and retrolisthesis of lumbar 3-4 and lumbar 4-5 as well as anterolisthesis of lumbar 5-sacral 1 all of which do not have significant motion on dynamic xray's. MRI of lumbar spine shows left paracentral disc extrusion which causes left lateral recess stenosis and L5 nerve impingement. Also has right lumbar 5-sacral 1 foraminal narrowing. Recommend left lumbar 4-lumbar 5 hemilaminectomy, medial facetectomy, foraminotomy, and microdiscectomy. Risks and benefits of lumbar decompression discussed\"      Preop Questions 2/1/2023   1. Have you ever had a heart attack or stroke? No   2. Have you ever had surgery on your heart or blood vessels, such as a stent placement, a coronary artery bypass, or surgery on an artery in your head, neck, heart, or legs? No   3. Do you have chest pain with activity? No   4. Do you have a history of  heart failure? No   5. Do you currently have a cold, bronchitis or symptoms of other infection? No   6. Do you have a cough, shortness of breath, or wheezing? No   7. Do you or anyone in your family have previous history of blood clots? No   8. Do you or does anyone in your family have a serious bleeding problem such as prolonged bleeding following " surgeries or cuts? No   9. Have you ever had problems with anemia or been told to take iron pills? No   10. Have you had any abnormal blood loss such as black, tarry or bloody stools? No   11. Have you ever had a blood transfusion? No   12. Are you willing to have a blood transfusion if it is medically needed before, during, or after your surgery? Yes   13. Have you or any of your relatives ever had problems with anesthesia? No   14. Do you have sleep apnea, excessive snoring or daytime drowsiness? No   15. Do you have any artifical heart valves or other implanted medical devices like a pacemaker, defibrillator, or continuous glucose monitor? No   16. Do you have artificial joints? No   17. Are you allergic to latex? No     Health Care Directive:  Patient does not have a Health Care Directive or Living Will: Discussed advance care planning with patient; however, patient declined at this time.    Preoperative Review of :   reviewed - no record of controlled substances prescribed.          Review of Systems  CONSTITUTIONAL: NEGATIVE for fever, chills, change in weight  INTEGUMENTARY/SKIN: NEGATIVE for worrisome rashes, moles or lesions  EYES: NEGATIVE for vision changes or irritation  ENT/MOUTH: NEGATIVE for ear, mouth and throat problems  RESP: NEGATIVE for significant cough or SOB  CV: NEGATIVE for chest pain, palpitations or peripheral edema  GI: NEGATIVE for nausea, abdominal pain, heartburn, or change in bowel habits  : NEGATIVE for frequency, dysuria, or hematuria  MUSCULOSKELETAL: NEGATIVE for significant arthralgias or myalgia  NEURO: NEGATIVE for weakness, dizziness or paresthesias  ENDOCRINE: NEGATIVE for temperature intolerance, skin/hair changes  HEME: NEGATIVE for bleeding problems  PSYCHIATRIC: NEGATIVE for changes in mood or affect    Patient Active Problem List    Diagnosis Date Noted     DDD (degenerative disc disease), lumbar 02/01/2023     Priority: Medium     Chronic left-sided low back  pain with left-sided sciatica 02/01/2023     Priority: Medium     Lumbar facet arthropathy 02/01/2023     Priority: Medium     Nicotine Dependence      Priority: Medium     Created by [facility] Annotation: Apr 2 2010  5:14PM -  ,  : smokes 5 cig/day          Past Medical History:   Diagnosis Date     Tuberculosis     latent TB.  completed a course of INR in 2010     History reviewed. No pertinent surgical history.  Current Outpatient Medications   Medication Sig Dispense Refill     acetaminophen (TYLENOL) 500 MG tablet Take 2 tablets (1,000 mg) by mouth 3 times daily as needed for pain 100 tablet 4     atorvastatin (LIPITOR) 20 MG tablet Take 1 tablet (20 mg) by mouth At Bedtime 90 tablet 3     fluticasone propionate (FLONASE) 50 mcg/actuation nasal spray [FLUTICASONE PROPIONATE (FLONASE) 50 MCG/ACTUATION NASAL SPRAY] 1 spray into each nostril 2 times a day at 6:00 am and 4:00 pm. 18 g 2     gabapentin (NEURONTIN) 100 MG capsule Take 1 capsule (100 mg) by mouth 3 times daily 120 capsule 3     nabumetone (RELAFEN) 500 MG tablet Take 1-2 tablets (500-1,000 mg) by mouth 2 times daily as needed for moderate pain (4-6) 90 tablet 1     naproxen (NAPROSYN) 500 MG tablet Take 1 tablet (500 mg) by mouth 2 times daily (with meals) 60 tablet 3     nicotine (COMMIT) 2 MG lozenge Place 1 lozenge (2 mg) inside cheek every hour as needed for smoking cessation 100 lozenge 3     phenylephrine-cocoa butter (PREPARATION H) 0.25-88.44 % suppository Place 1 suppository rectally as needed for hemorrhoids or itching 24 suppository 3     phenylephrine-shark liver oil-mineral oil-petrolatum (PREPARATION H) 0.25-3-14-71.9 % rectal ointment Place rectally 2 times daily as needed for hemorrhoids 57 g 3     pregabalin (LYRICA) 50 MG capsule Take 1 capsule (50 mg) by mouth 3 times daily 90 capsule 0       No Known Allergies     Social History     Tobacco Use     Smoking status: Every Day     Types: Cigarettes     Smokeless  "tobacco: Current     Tobacco comments:     chews betel nut w/o tobacco   Substance Use Topics     Alcohol use: Yes     Comment: Alcoholic Drinks/day: sometimes       History   Drug Use Unknown         Objective     /78   Pulse 90   Temp 97.9  F (36.6  C) (Oral)   Resp 16   Ht 1.671 m (5' 5.79\")   Wt 74.4 kg (164 lb)   SpO2 100%   BMI 26.64 kg/m      Physical Exam    GENERAL APPEARANCE: healthy, alert and no distress     EYES: EOMI,  PERRL     HENT: ear canals and TM's normal and nose and mouth without ulcers or lesions     NECK: no adenopathy, no asymmetry, masses, or scars and thyroid normal to palpation     RESP: lungs clear to auscultation - no rales, rhonchi or wheezes     CV: regular rates and rhythm, normal S1 S2, no S3 or S4 and no murmur, click or rub     ABDOMEN:  soft, nontender, no HSM or masses and bowel sounds normal     MS: extremities normal- no gross deformities noted, no evidence of inflammation in joints, FROM in all extremities.     SKIN: no suspicious lesions or rashes     NEURO: Normal strength and tone, sensory exam grossly normal, mentation intact and speech normal     PSYCH: mentation appears normal. and affect normal/bright     LYMPHATICS: No cervical adenopathy    Recent Labs   Lab Test 01/26/23  0915 12/07/22  1223   HGB 15.1 15.2    251   INR 0.92  --     141   POTASSIUM 3.8 3.6   CR 0.90 0.98        Diagnostics:  Recent Results (from the past 504 hour(s))   CBC with platelets    Collection Time: 01/26/23  9:15 AM   Result Value Ref Range    WBC Count 8.4 4.0 - 11.0 10e3/uL    RBC Count 5.08 4.40 - 5.90 10e6/uL    Hemoglobin 15.1 13.3 - 17.7 g/dL    Hematocrit 46.1 40.0 - 53.0 %    MCV 91 78 - 100 fL    MCH 29.7 26.5 - 33.0 pg    MCHC 32.8 31.5 - 36.5 g/dL    RDW 13.2 10.0 - 15.0 %    Platelet Count 266 150 - 450 10e3/uL   Basic metabolic panel  (Ca, Cl, CO2, Creat, Gluc, K, Na, BUN)    Collection Time: 01/26/23  9:15 AM   Result Value Ref Range    Sodium 142 136 " - 145 mmol/L    Potassium 3.8 3.4 - 5.3 mmol/L    Chloride 105 98 - 107 mmol/L    Carbon Dioxide (CO2) 28 22 - 29 mmol/L    Anion Gap 9 7 - 15 mmol/L    Urea Nitrogen 10.3 6.0 - 20.0 mg/dL    Creatinine 0.90 0.67 - 1.17 mg/dL    Calcium 9.8 8.6 - 10.0 mg/dL    Glucose 78 70 - 99 mg/dL    GFR Estimate >90 >60 mL/min/1.73m2   INR    Collection Time: 01/26/23  9:15 AM   Result Value Ref Range    INR 0.92 0.85 - 1.15   Partial thromboplastin time    Collection Time: 01/26/23  9:15 AM   Result Value Ref Range    aPTT 26 22 - 38 Seconds       No results found for this or any previous visit (from the past 24 hour(s)).   No EKG required, no history of coronary heart disease, significant arrhythmia, peripheral arterial disease or other structural heart disease.    Revised Cardiac Risk Index (RCRI):  The patient has the following serious cardiovascular risks for perioperative complications:   - No serious cardiac risks = 0 points     RCRI Interpretation: 0 points: Class I (very low risk - 0.4% complication rate)           Signed Electronically by: Sonia Wu MD  Copy of this evaluation report is provided to requesting physician.

## 2023-02-16 ENCOUNTER — TELEPHONE (OUTPATIENT)
Dept: NEUROSURGERY | Facility: CLINIC | Age: 43
End: 2023-02-16
Payer: COMMERCIAL

## 2023-02-16 ENCOUNTER — ANESTHESIA EVENT (OUTPATIENT)
Dept: SURGERY | Facility: CLINIC | Age: 43
End: 2023-02-16
Payer: COMMERCIAL

## 2023-02-16 NOTE — TELEPHONE ENCOUNTER
Called patient, discussed surgery, post-op course, expectations, follow up plan.    Reviewed H&P from 02/01/2023 - cleared for surgery  Labs - WNL    MRI done on 12/09/2022 - in [facility]    To OR as planned.     Check in - 0530    Nothing to eat or drink after midnight the night before surgery.     Reviewed with patient: Arrive 2 hours prior to scheduled surgery.    Bring all pertinent films to hospital the day of surgery.     Continue to refrain from NSAIDS (Ibuprofen, Aleve, Naprosyn), ASA, Over the counter herbal medications or supplements, anti-coagulants and blood thinners.     Patient confirmed they have help/assistance in place at home upon discharge    Instructions: using a washcloth and a bottle of provided Hibiclens, wash your body, avoiding your face and genitals. Preferably, shower the night before surgery and the morning of surgery using a half a bottle each time for your whole body shower.        Patient was informed that we will provide up to 6 weeks prescription of pain medication for post-operative pain.      Instructed patient about the following: After your surgery, if you will be staying in-patient, a nursing provider team will be monitoring you closely throughout your stay and communicate your health status to your surgeon and other providers.  You will be seen by Advanced Practice Providers (e.g., nurse practitioners, clinic nurse specialist, and physician assistants) who will check on you regularly to assess the status of your surgery.     Cris Young RN, CNRN

## 2023-02-17 ENCOUNTER — HOSPITAL ENCOUNTER (OUTPATIENT)
Facility: CLINIC | Age: 43
Discharge: HOME | End: 2023-02-17
Attending: SURGERY | Admitting: SURGERY
Payer: COMMERCIAL

## 2023-02-17 ENCOUNTER — ANESTHESIA (OUTPATIENT)
Dept: SURGERY | Facility: CLINIC | Age: 43
End: 2023-02-17
Payer: COMMERCIAL

## 2023-02-17 ENCOUNTER — APPOINTMENT (OUTPATIENT)
Dept: RADIOLOGY | Facility: CLINIC | Age: 43
End: 2023-02-17
Attending: NURSE PRACTITIONER
Payer: COMMERCIAL

## 2023-02-17 VITALS
OXYGEN SATURATION: 97 % | HEIGHT: 66 IN | HEART RATE: 83 BPM | WEIGHT: 164 LBS | SYSTOLIC BLOOD PRESSURE: 103 MMHG | RESPIRATION RATE: 11 BRPM | TEMPERATURE: 97 F | BODY MASS INDEX: 26.36 KG/M2 | DIASTOLIC BLOOD PRESSURE: 67 MMHG

## 2023-02-17 DIAGNOSIS — G89.29 CHRONIC LEFT-SIDED LOW BACK PAIN WITH LEFT-SIDED SCIATICA: ICD-10-CM

## 2023-02-17 DIAGNOSIS — M54.42 CHRONIC LEFT-SIDED LOW BACK PAIN WITH LEFT-SIDED SCIATICA: ICD-10-CM

## 2023-02-17 DIAGNOSIS — M47.816 LUMBAR FACET ARTHROPATHY: Primary | ICD-10-CM

## 2023-02-17 PROCEDURE — 272N000001 HC OR GENERAL SUPPLY STERILE: Performed by: SURGERY

## 2023-02-17 PROCEDURE — 258N000003 HC RX IP 258 OP 636: Performed by: ANESTHESIOLOGY

## 2023-02-17 PROCEDURE — 250N000013 HC RX MED GY IP 250 OP 250 PS 637: Performed by: NURSE PRACTITIONER

## 2023-02-17 PROCEDURE — 250N000005 HC OR RX SURGIFLO HEMOSTATIC MATRIX 10ML 199102S OPNP: Performed by: SURGERY

## 2023-02-17 PROCEDURE — 250N000025 HC SEVOFLURANE, PER MIN: Performed by: SURGERY

## 2023-02-17 PROCEDURE — 999N000063 XR CROSSTABLE LATERAL LUMBAR SPINE PORTABLE

## 2023-02-17 PROCEDURE — 250N000009 HC RX 250: Performed by: NURSE ANESTHETIST, CERTIFIED REGISTERED

## 2023-02-17 PROCEDURE — 250N000009 HC RX 250: Performed by: NURSE PRACTITIONER

## 2023-02-17 PROCEDURE — 258N000003 HC RX IP 258 OP 636: Performed by: NURSE ANESTHETIST, CERTIFIED REGISTERED

## 2023-02-17 PROCEDURE — 250N000009 HC RX 250: Performed by: ANESTHESIOLOGY

## 2023-02-17 PROCEDURE — 250N000011 HC RX IP 250 OP 636: Performed by: ANESTHESIOLOGY

## 2023-02-17 PROCEDURE — 250N000013 HC RX MED GY IP 250 OP 250 PS 637: Performed by: ANESTHESIOLOGY

## 2023-02-17 PROCEDURE — 250N000011 HC RX IP 250 OP 636: Performed by: NURSE PRACTITIONER

## 2023-02-17 PROCEDURE — 63030 LAMOT DCMPRN NRV RT 1 LMBR: CPT | Mod: LT | Performed by: SURGERY

## 2023-02-17 PROCEDURE — 360N000077 HC SURGERY LEVEL 4, PER MIN: Performed by: SURGERY

## 2023-02-17 PROCEDURE — 250N000009 HC RX 250: Performed by: SURGERY

## 2023-02-17 PROCEDURE — 710N000010 HC RECOVERY PHASE 1, LEVEL 2, PER MIN: Performed by: SURGERY

## 2023-02-17 PROCEDURE — 710N000012 HC RECOVERY PHASE 2, PER MINUTE: Performed by: SURGERY

## 2023-02-17 PROCEDURE — 63030 LAMOT DCMPRN NRV RT 1 LMBR: CPT | Mod: AS | Performed by: NURSE PRACTITIONER

## 2023-02-17 PROCEDURE — 370N000017 HC ANESTHESIA TECHNICAL FEE, PER MIN: Performed by: SURGERY

## 2023-02-17 PROCEDURE — 999N000141 HC STATISTIC PRE-PROCEDURE NURSING ASSESSMENT: Performed by: SURGERY

## 2023-02-17 PROCEDURE — 250N000011 HC RX IP 250 OP 636: Performed by: NURSE ANESTHETIST, CERTIFIED REGISTERED

## 2023-02-17 PROCEDURE — 2894A VOIDCORRECT: CPT | Mod: 59 | Performed by: SURGERY

## 2023-02-17 RX ORDER — ONDANSETRON 4 MG/1
4 TABLET, ORALLY DISINTEGRATING ORAL EVERY 30 MIN PRN
Status: DISCONTINUED | OUTPATIENT
Start: 2023-02-17 | End: 2023-02-17 | Stop reason: HOSPADM

## 2023-02-17 RX ORDER — ACETAMINOPHEN 325 MG/1
975 TABLET ORAL ONCE
Status: COMPLETED | OUTPATIENT
Start: 2023-02-17 | End: 2023-02-17

## 2023-02-17 RX ORDER — LIDOCAINE 40 MG/G
CREAM TOPICAL
Status: DISCONTINUED | OUTPATIENT
Start: 2023-02-17 | End: 2023-02-17 | Stop reason: HOSPADM

## 2023-02-17 RX ORDER — PROPOFOL 10 MG/ML
INJECTION, EMULSION INTRAVENOUS PRN
Status: DISCONTINUED | OUTPATIENT
Start: 2023-02-17 | End: 2023-02-17

## 2023-02-17 RX ORDER — CEFAZOLIN SODIUM/WATER 2 G/20 ML
2 SYRINGE (ML) INTRAVENOUS SEE ADMIN INSTRUCTIONS
Status: DISCONTINUED | OUTPATIENT
Start: 2023-02-17 | End: 2023-02-17 | Stop reason: HOSPADM

## 2023-02-17 RX ORDER — ONDANSETRON 2 MG/ML
4 INJECTION INTRAMUSCULAR; INTRAVENOUS EVERY 30 MIN PRN
Status: DISCONTINUED | OUTPATIENT
Start: 2023-02-17 | End: 2023-02-17 | Stop reason: HOSPADM

## 2023-02-17 RX ORDER — FENTANYL CITRATE 50 UG/ML
100 INJECTION, SOLUTION INTRAMUSCULAR; INTRAVENOUS ONCE
Status: COMPLETED | OUTPATIENT
Start: 2023-02-17 | End: 2023-02-17

## 2023-02-17 RX ORDER — SODIUM CHLORIDE, SODIUM LACTATE, POTASSIUM CHLORIDE, CALCIUM CHLORIDE 600; 310; 30; 20 MG/100ML; MG/100ML; MG/100ML; MG/100ML
INJECTION, SOLUTION INTRAVENOUS CONTINUOUS
Status: DISCONTINUED | OUTPATIENT
Start: 2023-02-17 | End: 2023-02-17 | Stop reason: HOSPADM

## 2023-02-17 RX ORDER — FAMOTIDINE 20 MG/1
20 TABLET, FILM COATED ORAL ONCE
Status: COMPLETED | OUTPATIENT
Start: 2023-02-17 | End: 2023-02-17

## 2023-02-17 RX ORDER — BUPIVACAINE HYDROCHLORIDE AND EPINEPHRINE 2.5; 5 MG/ML; UG/ML
INJECTION, SOLUTION EPIDURAL; INFILTRATION; INTRACAUDAL; PERINEURAL PRN
Status: DISCONTINUED | OUTPATIENT
Start: 2023-02-17 | End: 2023-02-17 | Stop reason: HOSPADM

## 2023-02-17 RX ORDER — HYDROMORPHONE HCL IN WATER/PF 6 MG/30 ML
0.2 PATIENT CONTROLLED ANALGESIA SYRINGE INTRAVENOUS EVERY 5 MIN PRN
Status: DISCONTINUED | OUTPATIENT
Start: 2023-02-17 | End: 2023-02-17 | Stop reason: HOSPADM

## 2023-02-17 RX ORDER — HYDROMORPHONE HCL IN WATER/PF 6 MG/30 ML
0.4 PATIENT CONTROLLED ANALGESIA SYRINGE INTRAVENOUS EVERY 5 MIN PRN
Status: DISCONTINUED | OUTPATIENT
Start: 2023-02-17 | End: 2023-02-17 | Stop reason: HOSPADM

## 2023-02-17 RX ORDER — CYCLOBENZAPRINE HCL 5 MG
5 TABLET ORAL 3 TIMES DAILY PRN
Qty: 30 TABLET | Refills: 0 | Status: SHIPPED | OUTPATIENT
Start: 2023-02-17 | End: 2023-05-01

## 2023-02-17 RX ORDER — CEFAZOLIN SODIUM/WATER 2 G/20 ML
2 SYRINGE (ML) INTRAVENOUS
Status: COMPLETED | OUTPATIENT
Start: 2023-02-17 | End: 2023-02-17

## 2023-02-17 RX ORDER — FENTANYL CITRATE 50 UG/ML
25 INJECTION, SOLUTION INTRAMUSCULAR; INTRAVENOUS EVERY 5 MIN PRN
Status: DISCONTINUED | OUTPATIENT
Start: 2023-02-17 | End: 2023-02-17 | Stop reason: HOSPADM

## 2023-02-17 RX ORDER — OXYCODONE HYDROCHLORIDE 5 MG/1
5 TABLET ORAL EVERY 4 HOURS PRN
Qty: 30 TABLET | Refills: 0 | Status: SHIPPED | OUTPATIENT
Start: 2023-02-17 | End: 2023-05-01

## 2023-02-17 RX ORDER — KETAMINE HYDROCHLORIDE 10 MG/ML
INJECTION INTRAMUSCULAR; INTRAVENOUS PRN
Status: DISCONTINUED | OUTPATIENT
Start: 2023-02-17 | End: 2023-02-17

## 2023-02-17 RX ORDER — MAGNESIUM HYDROXIDE 1200 MG/15ML
LIQUID ORAL PRN
Status: DISCONTINUED | OUTPATIENT
Start: 2023-02-17 | End: 2023-02-17 | Stop reason: HOSPADM

## 2023-02-17 RX ORDER — FENTANYL CITRATE 50 UG/ML
100 INJECTION, SOLUTION INTRAMUSCULAR; INTRAVENOUS
Status: DISCONTINUED | OUTPATIENT
Start: 2023-02-17 | End: 2023-02-17 | Stop reason: HOSPADM

## 2023-02-17 RX ORDER — OXYCODONE HYDROCHLORIDE 5 MG/1
10 TABLET ORAL EVERY 4 HOURS PRN
Status: DISCONTINUED | OUTPATIENT
Start: 2023-02-17 | End: 2023-02-17 | Stop reason: HOSPADM

## 2023-02-17 RX ORDER — DEXAMETHASONE SODIUM PHOSPHATE 10 MG/ML
10 INJECTION, SOLUTION INTRAMUSCULAR; INTRAVENOUS ONCE
Status: COMPLETED | OUTPATIENT
Start: 2023-02-17 | End: 2023-02-17

## 2023-02-17 RX ORDER — OXYCODONE HYDROCHLORIDE 5 MG/1
5 TABLET ORAL EVERY 4 HOURS PRN
Status: DISCONTINUED | OUTPATIENT
Start: 2023-02-17 | End: 2023-02-17 | Stop reason: HOSPADM

## 2023-02-17 RX ORDER — FENTANYL CITRATE 50 UG/ML
50 INJECTION, SOLUTION INTRAMUSCULAR; INTRAVENOUS EVERY 5 MIN PRN
Status: DISCONTINUED | OUTPATIENT
Start: 2023-02-17 | End: 2023-02-17 | Stop reason: HOSPADM

## 2023-02-17 RX ADMIN — MIDAZOLAM 1 MG: 1 INJECTION INTRAMUSCULAR; INTRAVENOUS at 07:28

## 2023-02-17 RX ADMIN — HYDROMORPHONE HYDROCHLORIDE 0.5 MG: 1 INJECTION, SOLUTION INTRAMUSCULAR; INTRAVENOUS; SUBCUTANEOUS at 08:10

## 2023-02-17 RX ADMIN — HYDROMORPHONE HYDROCHLORIDE 0.5 MG: 1 INJECTION, SOLUTION INTRAMUSCULAR; INTRAVENOUS; SUBCUTANEOUS at 08:06

## 2023-02-17 RX ADMIN — PHENYLEPHRINE HYDROCHLORIDE 100 MCG: 10 INJECTION INTRAVENOUS at 08:18

## 2023-02-17 RX ADMIN — FAMOTIDINE 20 MG: 20 TABLET ORAL at 06:34

## 2023-02-17 RX ADMIN — PROPOFOL 180 MG: 10 INJECTION, EMULSION INTRAVENOUS at 07:38

## 2023-02-17 RX ADMIN — Medication 2 G: at 07:28

## 2023-02-17 RX ADMIN — KETAMINE HYDROCHLORIDE 30 MG: 10 INJECTION, SOLUTION INTRAMUSCULAR; INTRAVENOUS at 08:03

## 2023-02-17 RX ADMIN — DEXMEDETOMIDINE HYDROCHLORIDE 0.5 MCG/KG/HR: 100 INJECTION, SOLUTION INTRAVENOUS at 07:40

## 2023-02-17 RX ADMIN — FENTANYL CITRATE 100 MCG: 50 INJECTION, SOLUTION INTRAMUSCULAR; INTRAVENOUS at 07:37

## 2023-02-17 RX ADMIN — MIDAZOLAM 1 MG: 1 INJECTION INTRAMUSCULAR; INTRAVENOUS at 07:38

## 2023-02-17 RX ADMIN — SUGAMMADEX 200 MG: 100 INJECTION, SOLUTION INTRAVENOUS at 09:15

## 2023-02-17 RX ADMIN — SODIUM CHLORIDE, POTASSIUM CHLORIDE, SODIUM LACTATE AND CALCIUM CHLORIDE: 600; 310; 30; 20 INJECTION, SOLUTION INTRAVENOUS at 08:16

## 2023-02-17 RX ADMIN — SODIUM CHLORIDE, POTASSIUM CHLORIDE, SODIUM LACTATE AND CALCIUM CHLORIDE: 600; 310; 30; 20 INJECTION, SOLUTION INTRAVENOUS at 06:44

## 2023-02-17 RX ADMIN — PHENYLEPHRINE HYDROCHLORIDE 0.5 MCG/KG/MIN: 10 INJECTION INTRAVENOUS at 08:18

## 2023-02-17 RX ADMIN — OXYCODONE HYDROCHLORIDE 10 MG: 5 TABLET ORAL at 11:25

## 2023-02-17 RX ADMIN — ACETAMINOPHEN 975 MG: 325 TABLET ORAL at 06:34

## 2023-02-17 RX ADMIN — FENTANYL CITRATE 50 MCG: 50 INJECTION, SOLUTION INTRAMUSCULAR; INTRAVENOUS at 10:03

## 2023-02-17 RX ADMIN — ROCURONIUM BROMIDE 50 MG: 10 INJECTION, SOLUTION INTRAVENOUS at 07:38

## 2023-02-17 RX ADMIN — DEXAMETHASONE SODIUM PHOSPHATE 10 MG: 10 INJECTION, SOLUTION INTRAMUSCULAR; INTRAVENOUS at 07:38

## 2023-02-17 RX ADMIN — LIDOCAINE HYDROCHLORIDE 20 MG: 10 INJECTION, SOLUTION INFILTRATION; PERINEURAL at 07:37

## 2023-02-17 ASSESSMENT — ACTIVITIES OF DAILY LIVING (ADL)
ADLS_ACUITY_SCORE: 35

## 2023-02-17 NOTE — INTERVAL H&P NOTE
"I have reviewed the surgical (or preoperative) H&P that is linked to this encounter, and examined the patient. There are no significant changes    Clinical Conditions Present on Arrival:  Clinically Significant Risk Factors Present on Admission                    # Overweight: Estimated body mass index is 26.45 kg/m  as calculated from the following:    Height as of this encounter: 5' 6.02\" (1.677 m).    Weight as of this encounter: 164 lb (74.4 kg).       "

## 2023-02-17 NOTE — ANESTHESIA PREPROCEDURE EVALUATION
Anesthesia Pre-Procedure Evaluation    Patient: Beh G Dwe   MRN: [id number] : 1980        Procedure : Procedure(s):  left lumbar 4-lumbar 5 hemilaminectomy, medial facetectomy, foraminotomy, and microdiscectomy.          Past Medical History:   Diagnosis Date     Tuberculosis     latent TB.  completed a course of INR in       History reviewed. No pertinent surgical history.   No Known Allergies   Social History     Tobacco Use     Smoking status: Every Day     Types: Cigarettes     Smokeless tobacco: Former     Tobacco comments:     chews betel nut w/o tobacco, trying to quit   Substance Use Topics     Alcohol use: Yes     Comment: Alcoholic Drinks/day: sometimes      Wt Readings from Last 1 Encounters:   23 74.4 kg (164 lb)        Anesthesia Evaluation   Pt has not had prior anesthetic         ROS/MED HX  ENT/Pulmonary:  - neg pulmonary ROS     Neurologic:  - neg neurologic ROS     Cardiovascular:  - neg cardiovascular ROS     METS/Exercise Tolerance: >4 METS    Hematologic:  - neg hematologic  ROS     Musculoskeletal:   (+) arthritis,     GI/Hepatic:  - neg GI/hepatic ROS     Renal/Genitourinary:  - neg Renal ROS     Endo:  - neg endo ROS     Psychiatric/Substance Use:  - neg psychiatric ROS     Infectious Disease:  - neg infectious disease ROS     Malignancy:  - neg malignancy ROS     Other:  - neg other ROS    (+) , H/O Chronic Pain,        Physical Exam    Airway  airway exam normal      Mallampati: II   TM distance: > 3 FB   Neck ROM: full   Mouth opening: > 3 cm    Respiratory Devices and Support         Dental    unable to assess        Cardiovascular   cardiovascular exam normal          Pulmonary   pulmonary exam normal                OUTSIDE LABS:  CBC:   Lab Results   Component Value Date    WBC 8.4 2023    WBC 9.5 2022    HGB 15.1 2023    HGB 15.2 2022    HCT 46.1 2023    HCT 46.8 2022     2023     2022     BMP:   Lab  Results   Component Value Date     01/26/2023     12/07/2022    POTASSIUM 3.8 01/26/2023    POTASSIUM 3.6 12/07/2022    CHLORIDE 105 01/26/2023    CHLORIDE 104 12/07/2022    CO2 28 01/26/2023    CO2 24 12/07/2022    BUN 10.3 01/26/2023    BUN 13.4 12/07/2022    CR 0.90 01/26/2023    CR 0.98 12/07/2022    GLC 78 01/26/2023     (H) 12/07/2022     COAGS:   Lab Results   Component Value Date    PTT 26 01/26/2023    INR 0.92 01/26/2023     POC: No results found for: BGM, HCG, HCGS  HEPATIC:   Lab Results   Component Value Date    ALBUMIN 4.4 12/07/2022    PROTTOTAL 7.2 12/07/2022    ALT 35 12/07/2022    AST 28 12/07/2022    ALKPHOS 50 12/07/2022    BILITOTAL 0.3 12/07/2022     OTHER:   Lab Results   Component Value Date    DECLAN 9.8 01/26/2023    TSH 0.50 12/07/2022       Anesthesia Plan    ASA Status:  2      Anesthesia Type: General.     - Airway: ETT   Induction: Intravenous.   Maintenance: Balanced.        Consents    Anesthesia Plan(s) and associated risks, benefits, and realistic alternatives discussed. Questions answered and patient/representative(s) expressed understanding.    - Discussed:     - Discussed with:  Patient,          Postoperative Care    Pain management: IV analgesics, Neuraxial analgesia.   PONV prophylaxis: Ondansetron (or other 5HT-3), Dexamethasone or Solumedrol     Comments:                Abhijit Bright MD

## 2023-02-17 NOTE — ANESTHESIA POSTPROCEDURE EVALUATION
Patient: Beh G Dwe    Procedure: Procedure(s):  left lumbar 4-lumbar 5 hemilaminectomy, medial facetectomy, foraminotomy, and microdiscectomy.       Anesthesia Type:  General    Note:  Disposition: Outpatient   Postop Pain Control: Uneventful            Sign Out: Well controlled pain   PONV: No   Neuro/Psych: Uneventful            Sign Out: Acceptable/Baseline neuro status   Airway/Respiratory: Uneventful            Sign Out: Acceptable/Baseline resp. status   CV/Hemodynamics: Uneventful            Sign Out: Acceptable CV status; No obvious hypovolemia; No obvious fluid overload   Other NRE: NONE   DID A NON-ROUTINE EVENT OCCUR? No           Last vitals:  Vitals Value Taken Time   /59 02/17/23 0955   Temp 36.4  C (97.5  F) 02/17/23 0930   Pulse 76 02/17/23 0959   Resp 11 02/17/23 0959   SpO2 98 % 02/17/23 0959   Vitals shown include unvalidated device data.    Electronically Signed By: Abhijit Bright MD  February 17, 2023  10:01 AM

## 2023-02-17 NOTE — ANESTHESIA PROCEDURE NOTES
Airway       Patient location during procedure: OR       Procedure Start/Stop Times: 2/17/2023 7:40 AM  Staff -        CRNA: Cosmo Bahena APRN CRNA       Performed By: CRNA  Consent for Airway        Urgency: elective  Indications and Patient Condition       Indications for airway management: mayur-procedural       Induction type:intravenous       Mask difficulty assessment: 1 - vent by mask    Final Airway Details       Final airway type: endotracheal airway       Successful airway: ETT - single  Endotracheal Airway Details        ETT size (mm): 7.5       Cuffed: yes       Cuff volume (mL): 10       Successful intubation technique: direct laryngoscopy       DL Blade Type: [place] 2       Grade View of Cords: 1       Adjucts: stylet       Position: Right       Measured from: lips       Secured at (cm): 21       Bite block used: Soft    Post intubation assessment        Placement verified by: capnometry, equal breath sounds and chest rise        Number of attempts at approach: 1       Number of other approaches attempted: 0       Secured with: silk tape       Ease of procedure: easy       Dentition: Unchanged       Dental guard used and removed. Dental Guard Type: Proguard Red.    Medication(s) Administered   Medication Administration Time: 2/17/2023 7:40 AM

## 2023-02-17 NOTE — ANESTHESIA CARE TRANSFER NOTE
Patient: Beh G Dwe    Procedure: Procedure(s):  left lumbar 4-lumbar 5 hemilaminectomy, medial facetectomy, foraminotomy, and microdiscectomy.       Diagnosis: Lumbar radiculopathy [M54.16]  Lumbar disc herniation [M51.26]  Diagnosis Additional Information: No value filed.    Anesthesia Type:   General     Note:    Oropharynx: oropharynx clear of all foreign objects and spontaneously breathing  Level of Consciousness: drowsy  Oxygen Supplementation: face mask  Level of Supplemental Oxygen (L/min / FiO2): 8  Independent Airway: airway patency satisfactory and stable    Vital Signs Stable: post-procedure vital signs reviewed and stable  Report to RN Given: handoff report given  Patient transferred to: PACU    Handoff Report: Identifed the Patient, Identified the Reponsible Provider, Reviewed the pertinent medical history, Discussed the surgical course, Reviewed Intra-OP anesthesia mangement and issues during anesthesia, Set expectations for post-procedure period and Allowed opportunity for questions and acknowledgement of understanding      Vitals:  Vitals Value Taken Time   /76 02/17/23 0931   Temp 36.4  C (97.5  F) 02/17/23 0930   Pulse 76 02/17/23 0932   Resp 0 02/17/23 0932   SpO2 98 % 02/17/23 0932   Vitals shown include unvalidated device data.    Electronically Signed By: LISA Le CRNA  February 17, 2023  9:34 AM

## 2023-02-17 NOTE — BRIEF OP NOTE
[facility] Brief Operative Note    Pre-operative diagnosis: Lumbar radiculopathy [M54.16]  Lumbar disc herniation [M51.26]   Post-operative diagnosis same   Procedure: Procedure(s):  left lumbar 4-lumbar 5 hemilaminectomy, medial facetectomy, foraminotomy, and microdiscectomy.   Surgeon(s): Surgeon(s) and Role:     * Marlena Zuniga MD - Primary     * aCdy Garcia NP - Assisting   Estimated blood loss: 10 cc    Specimens: * No specimens in log *   Findings: Moderate disc herniation with partial calcification that had a soft caudal extension

## 2023-02-17 NOTE — PHARMACY-ADMISSION MEDICATION HISTORY
Pharmacist completed medication history with the patient while in the [facility].  Prior to admission (PTA) med list completed and updated in the electronic medical record (EMR).  Pharmacy Note - Admission Medication History  Pertinent Provider Information: patient reminded to not use naproxen and nabumatone at the same time (both NSAIDS)   ______________________________________________________________________  Prior To Admission (PTA) med list completed and updated in EMR.     Medications Prior to Admission   Medication Sig Dispense Refill Last Dose     acetaminophen (TYLENOL) 500 MG tablet Take 2 tablets (1,000 mg) by mouth 3 times daily as needed for pain 100 tablet 4 2/15/2023     atorvastatin (LIPITOR) 20 MG tablet Take 1 tablet (20 mg) by mouth At Bedtime 90 tablet 3 2/15/2023     fluticasone propionate (FLONASE) 50 mcg/actuation nasal spray [FLUTICASONE PROPIONATE (FLONASE) 50 MCG/ACTUATION NASAL SPRAY] 1 spray into each nostril 2 times a day at 6:00 am and 4:00 pm. 18 g 2 More than a month     nabumetone (RELAFEN) 500 MG tablet Take 1-2 tablets (500-1,000 mg) by mouth 2 times daily as needed for moderate pain (4-6) 90 tablet 1 2/13/2023     naproxen (NAPROSYN) 500 MG tablet Take 1 tablet (500 mg) by mouth 2 times daily (with meals) 60 tablet 3 More than a month     nicotine (COMMIT) 2 MG lozenge Place 1 lozenge (2 mg) inside cheek every hour as needed for smoking cessation 100 lozenge 3 2/12/2023     phenylephrine-cocoa butter (PREPARATION H) 0.25-88.44 % suppository Place 1 suppository rectally as needed for hemorrhoids or itching 24 suppository 3 More than a month     phenylephrine-shark liver oil-mineral oil-petrolatum (PREPARATION H) 0.25-3-14-71.9 % rectal ointment Place rectally 2 times daily as needed for hemorrhoids 57 g 3 More than a month     pregabalin (LYRICA) 50 MG capsule Take 1 capsule (50 mg) by mouth 3 times daily 90 capsule 0 2/16/2023         Information source(s): Patient and  CareEverywhere/SureScripts  Patient was asked about OTC/herbal products specifically.  PTA med list reflects this.  Based on the pharmacist s assessment, the PTA med list information appears reliable  Allergies were reviewed, assessed, and updated with the patient.    Medications available for use during hospital stay: none   Thank you for the opportunity to participate in the care of this patient.    The patient was asked about OTC/herbal products specifically and the PTA med list reflects the patient's response.    Allergies were reviewed and assessed with the patient, responses were updated in the EMR.    Thank you for the opportunity to participate in the care of this patient.    Meliton Simmons RP 2/17/2023 7:36 AM

## 2023-02-20 NOTE — OP NOTE
NEUROSURGERY OPERATIVE REPORT     DATE OF SERVICE: 2/17/2023    PREOPERATIVE DIAGNOSES:  Lumbar radiculopathy  Lumbar disc herniation     POSTOPERATIVE DIAGNOSES:  Same    PROCEDURES:  1.  Left lumbar 4-lumbar 5 hemilaminectomy, medial facetectomy, foraminotomy, and microdiscectomy  2.  Use of operating room microscope.    SURGEON:  Marlena Zuniga MD    ASSISTANT: Mikhail Garcia CNP    INDICATIONS:  Beh G Dwe is a 41 yo male who presents with low back and left leg pain, numbness, tingling and weakness. Lumbar xray shows bilateral L5 pars defects and retrolisthesis of lumbar 3-4 and lumbar 4-5 as well as anterolisthesis of lumbar 5-sacral 1 all of which do not have significant motion on dynamic xray's. MRI of lumbar spine shows left paracentral disc extrusion which causes left lateral recess stenosis and L5 nerve impingement. Also has right lumbar 5-sacral 1 foraminal narrowing. Recommend left lumbar 4-lumbar 5 hemilaminectomy, medial facetectomy, foraminotomy, and microdiscectomy. Risks and benefits of lumbar decompression discussed including but not limited to infection, hematoma, nerve damage including paralysis, recurrent disc herniation, post op radiculitis, durotomy, risks associated with the use of general anesthesia, blood clots in the lungs or legs. He agreed to proceed.     PROCEDURE:  After obtaining informed consent, the patient was brought to the operating room with pneumatic stockings in place.  IV antibiotics was administered.  He was intubated under general  endotracheal anesthesia.  He was turned into the radiolucent laminectomy frame with all pressure points well padded.   He was prepped and draped in the usual sterile fashion.  A preincisional infiltration of local anesthetic was performed along the midline and the incision was opened sharply and extended down to the fascia.  The fascia was opened in one layer with monopolar electrocautery.  A subperiosteal dissection was undertaken to expose  the lamina at left lumbar 4-5.  We verified levels with a lateral x-ray.      Once levels were verified, we then proceeded to remove the inferior portion of the lamina of left lumbar 4 and the superior portion of the lamina of lumbar 5  with a Midas drill.  We drilled down to the ligamentum elevated the ligamentum from the underlying thecal sac and removed it with a combination of Kerrisons and curettes.  We brought in the operating room microscope for this and all microdissection.  We next drilled a partial medial facetectomy and foraminotomies opening up the lateral recess and expose the underlying impinged nerve root. We exposed the annulus of the disc space identified the nerve and cut the annulus as needed to evacuate the disc bulge to release impingement on the nerve.  We used a combination of pituitaries and curettes to remove sufficient disc.  We used blunt hooks under the thecal sac and in the foramen to verify that there was no further disc material to milk out into the dissection site.  Once the nerve was completely decompressed we used a small ball tip probe to verify no further pressure either in the canal or in the foramina.  The lateral recess was nicely decompressed.       An assistant was needed for positioning, retraction and closure.     We then proceeded to copiously irrigate the incision with antibiotic-containing saline and achieved hemostasis.   The incision was closed in layers with interrupted 0 Vicryl to approximate the muscle and fascia, inverted 2-0 Vicryl to approximate the subcutaneous tissues and Monocryl to approximate the skin edges.  Sponge and needle counts were correct prior to closure x2.  Sterile dressings were placed.      Patient tolerated the procedure well, was taken to the recovery room where he was extubated and found to be at his neurological baseline with no new deficits appreciated.    Estimated blood loss: 10 ml    Specimens: none    Findings:Moderate disc herniation  with partial calcification that had a soft caudal extension       Marlena Zuniga MD    Cc: Sonia Wu Tac

## 2023-03-02 ENCOUNTER — OFFICE VISIT (OUTPATIENT)
Dept: NEUROSURGERY | Facility: CLINIC | Age: 43
End: 2023-03-02
Payer: COMMERCIAL

## 2023-03-02 VITALS — RESPIRATION RATE: 18 BRPM | SYSTOLIC BLOOD PRESSURE: 120 MMHG | HEART RATE: 72 BPM | DIASTOLIC BLOOD PRESSURE: 81 MMHG

## 2023-03-02 DIAGNOSIS — M54.16 LUMBAR RADICULOPATHY: Primary | ICD-10-CM

## 2023-03-02 PROCEDURE — 99207 PR NO CHARGE NURSE ONLY: CPT

## 2023-03-02 RX ORDER — CYCLOBENZAPRINE HCL 5 MG
5 TABLET ORAL 3 TIMES DAILY PRN
Qty: 30 TABLET | Refills: 0 | Status: SHIPPED | OUTPATIENT
Start: 2023-03-02 | End: 2023-05-01

## 2023-03-02 RX ORDER — NAPROXEN 500 MG/1
500 TABLET ORAL 2 TIMES DAILY WITH MEALS
Qty: 20 TABLET | Refills: 0 | Status: SHIPPED | OUTPATIENT
Start: 2023-03-02 | End: 2023-03-24

## 2023-03-02 RX ORDER — LIDOCAINE 4 G/G
1 PATCH TOPICAL EVERY 24 HOURS
Qty: 10 PATCH | Refills: 0 | Status: SHIPPED | OUTPATIENT
Start: 2023-03-02 | End: 2023-05-01

## 2023-03-02 NOTE — PROGRESS NOTES
"Beh Dwe is status post Left lumbar 4-lumbar 5 hemilaminectomy, medial facetectomy, foraminotomy, and microdiscectomy on 02/17/2023 with Dr. Zuniga.  Preoperatively presented with low back and left leg pain, numbness, tingling and weakness.  Today he returns in follow up for a wound check. He is accompanied by his SO. The  was used throughout the entire appointment. Beh reports no leg pain, but ongoing \"muscle\" pain in his back. Denies sensory or motor disturbance in LE. Takes Flexeril - requested a refill. Gait and balance are stable,  eRx Lidocaine patches and naproxen.    Surgical wound WNL - CDI, no signs of infection or skin breakdown.  Incision well-healed: good skin approximation, no redness or visible/palpable edema, no tenderness to palpation.  PT. AF, denies fever, chills or sweats.  Pt. reports that the symptoms are improved from pre-op.    Cris Young RN, CNRN             "

## 2023-03-24 DIAGNOSIS — M54.16 LUMBAR RADICULOPATHY: ICD-10-CM

## 2023-03-24 RX ORDER — NAPROXEN 500 MG/1
TABLET ORAL
Qty: 20 TABLET | Refills: 0 | Status: SHIPPED | OUTPATIENT
Start: 2023-03-24 | End: 2023-05-01

## 2023-03-30 ENCOUNTER — OFFICE VISIT (OUTPATIENT)
Dept: NEUROSURGERY | Facility: CLINIC | Age: 43
End: 2023-03-30
Payer: COMMERCIAL

## 2023-03-30 VITALS — SYSTOLIC BLOOD PRESSURE: 120 MMHG | OXYGEN SATURATION: 93 % | HEART RATE: 86 BPM | DIASTOLIC BLOOD PRESSURE: 80 MMHG

## 2023-03-30 DIAGNOSIS — M54.16 LUMBAR RADICULOPATHY: Primary | ICD-10-CM

## 2023-03-30 DIAGNOSIS — M51.26 LUMBAR DISC HERNIATION: ICD-10-CM

## 2023-03-30 PROCEDURE — 99024 POSTOP FOLLOW-UP VISIT: CPT | Performed by: NURSE PRACTITIONER

## 2023-03-30 RX ORDER — CYCLOBENZAPRINE HCL 5 MG
5 TABLET ORAL 3 TIMES DAILY PRN
Qty: 30 TABLET | Refills: 1 | Status: SHIPPED | OUTPATIENT
Start: 2023-03-30 | End: 2023-05-01

## 2023-03-30 RX ORDER — NAPROXEN 250 MG/1
250 TABLET ORAL 2 TIMES DAILY PRN
Qty: 60 TABLET | Refills: 1 | Status: SHIPPED | OUTPATIENT
Start: 2023-03-30 | End: 2023-05-01

## 2023-03-30 ASSESSMENT — PAIN SCALES - GENERAL: PAINLEVEL: SEVERE PAIN (7)

## 2023-03-30 NOTE — NURSING NOTE
"Beh G Dwe is a 43 year old male who presents for:  Chief Complaint   Patient presents with     Surgical Followup     Pain in lower back, left side        Initial Vitals:  /80   Pulse 86   SpO2 93%  Estimated body mass index is 26.45 kg/m  as calculated from the following:    Height as of 2/17/23: 5' 6.02\" (1.677 m).    Weight as of 2/17/23: 164 lb (74.4 kg).. There is no height or weight on file to calculate BSA. BP completed using cuff size: regular  Severe Pain (7)        Dhruv Doss    "

## 2023-03-30 NOTE — PATIENT INSTRUCTIONS
You can take cyclobenzaprine as needed for muscle spasm while you get back to therapy  We will give you naprosyn for pain, you can take both just make sure you take them with food.        Otherwise gradually increase activity as tolerated, add back to lifting restriction 5 pounds per week.     Refer to PT and we will see you back in 8 weeks for follow up, sooner if you have issues.   If you are feeling well, you can cancel that appointment.       *Follow up 8 weeks from now with DOUGLAS on any day, favor Dr. Zuniga day with appointment in person*

## 2023-03-30 NOTE — LETTER
3/30/2023         RE: Beh G Dwe  [address]  [place] [address]        Dear Colleague,    Thank you for referring your patient, Beh G Dwe, to the [facility]. Please see a copy of my visit note below.    Neurosurgery Progress Note  04/03/23    A/P: Beh G Dwe is a 43 year old male who is s/p Left lumbar 4-lumbar 5 hemilaminectomy, medial /facetectomy, foraminotomy, and microdiscectomy on 02/17/2023 with Dr. Zuniga. On 2/17/2023.    Improvement in leg pain, still with some low back pain and tightness. No new neuro deficits. No PT.     Will plan refer to PT, follow up appointment scheduled - suspect improvement over time, low back tightness/soreness not unexpected. If doing well, can cancel appt.     Plan:  1. Refer to PT  2. Can gently increase activity as tolerated  3. Follow up 6-8 weeks to check symptom progression, sooner if needed. Can cancel if doing well.   4. Appt scheduled prior to leaving today    Appt was conducted with use of  for duration.     S: Leg is feeling better, still with quite a bit of back tightness. Has not been taking anything for pain.   PMH: No interval change  PSH: No interval change    ROS:  A full 14 point review of systems was otherwise completed and is negative aside from that mentioned above in the HPI    O:  /80   Pulse 86   SpO2 93%   General: Awake, alert, NAD  HEENT: AT/NC, EOMI, face symmetric, oral mucosa moist and pink  Heart: RRR: Nonlabored respirations without accessory muscle use.  Abd: S, NT, ND  Neuro: Awake, alert, speech is clear and content is appropriate. MAEW x 4 with full strength in b/l LE. Sensation is intact totemperature and LT.   Coordination: gait normal   Musculoskeletal: Negative straight leg raise bl  Psych: Appropriatemood and affect, pleasant, cooperative, alert and oriented x 3  Skin: Incision C/D/I - well healed.     Labs: personally reviewed   Lab Results   Component Value Date      01/26/2023     12/07/2022    CO2 28 01/26/2023    CO2 24 12/07/2022    BUN 10.3 01/26/2023    BUN 13.4 12/07/2022     Recent Labs   Lab Test 01/26/23  0915 12/07/22  1223   WBC 8.4 9.5   HGB 15.1 15.2   HCT 46.1 46.8   MCV 91 90    251     Recent Labs   Lab Test 01/26/23  0915   INR 0.92   PTT 26         I personally visualized all imaging. None new.     Cady Garcia, CNP  [facility]  O: [phone]            Again, thank you for allowing me to participate in the care of your patient.        Sincerely,        Cady Garcia NP

## 2023-04-03 NOTE — PROGRESS NOTES
Neurosurgery Progress Note  04/03/23    A/P: Beh G Dwe is a 43 year old male who is s/p Left lumbar 4-lumbar 5 hemilaminectomy, medial /facetectomy, foraminotomy, and microdiscectomy on 02/17/2023 with Dr. Zuniga. On 2/17/2023.    Improvement in leg pain, still with some low back pain and tightness. No new neuro deficits. No PT.     Will plan refer to PT, follow up appointment scheduled - suspect improvement over time, low back tightness/soreness not unexpected. If doing well, can cancel appt.     Plan:  1. Refer to PT  2. Can gently increase activity as tolerated  3. Follow up 6-8 weeks to check symptom progression, sooner if needed. Can cancel if doing well.   4. Appt scheduled prior to leaving today    Appt was conducted with use of  for duration.     S: Leg is feeling better, still with quite a bit of back tightness. Has not been taking anything for pain.   PMH: No interval change  PSH: No interval change    ROS:  A full 14 point review of systems was otherwise completed and is negative aside from that mentioned above in the HPI    O:  /80   Pulse 86   SpO2 93%   General: Awake, alert, NAD  HEENT: AT/NC, EOMI, face symmetric, oral mucosa moist and pink  Heart: RRR: Nonlabored respirations without accessory muscle use.  Abd: S, NT, ND  Neuro: Awake, alert, speech is clear and content is appropriate. MAEW x 4 with full strength in b/l LE. Sensation is intact totemperature and LT.   Coordination: gait normal   Musculoskeletal: Negative straight leg raise bl  Psych: Appropriatemood and affect, pleasant, cooperative, alert and oriented x 3  Skin: Incision C/D/I - well healed.     Labs: personally reviewed   Lab Results   Component Value Date     01/26/2023     12/07/2022    CO2 28 01/26/2023    CO2 24 12/07/2022    BUN 10.3 01/26/2023    BUN 13.4 12/07/2022     Recent Labs   Lab Test 01/26/23  0915 12/07/22  1223   WBC 8.4 9.5   HGB 15.1 15.2   HCT 46.1 46.8   MCV 91 90    251      Recent Labs   Lab Test 01/26/23  0915   INR 0.92   PTT 26         I personally visualized all imaging. None new.     Cady Garcia CNP  [facility]  O: [phone]

## 2023-04-27 ENCOUNTER — HOSPITAL ENCOUNTER (OUTPATIENT)
Dept: PHYSICAL THERAPY | Facility: REHABILITATION | Age: 43
Discharge: HOME | End: 2023-04-27
Attending: NURSE PRACTITIONER
Payer: COMMERCIAL

## 2023-04-27 DIAGNOSIS — M51.26 LUMBAR DISC HERNIATION: ICD-10-CM

## 2023-04-27 DIAGNOSIS — M54.16 LUMBAR RADICULOPATHY: ICD-10-CM

## 2023-04-27 PROCEDURE — 97161 PT EVAL LOW COMPLEX 20 MIN: CPT | Mod: GP | Performed by: PHYSICAL THERAPIST

## 2023-04-27 PROCEDURE — 97110 THERAPEUTIC EXERCISES: CPT | Mod: GP | Performed by: PHYSICAL THERAPIST

## 2023-04-28 NOTE — PROGRESS NOTES
[facility]    OUTPATIENT PHYSICAL THERAPY ORTHOPEDIC EVALUATION  PLAN OF TREATMENT FOR OUTPATIENT REHABILITATION  (COMPLETE FOR INITIAL CLAIMS ONLY)  Patient's Last Name, First Name, M.I.  YOB: 1980  Dwe,Beh G    Provider s Name:  [facility]   Medical Record No.  [id number]   Start of Care Date:  04/27/23   Onset Date:  03/30/23   Type:     _X__PT   ___OT   ___SLP Medical Diagnosis:  M54.16 (ICD-10-CM) - Lumbar radiculopathy  M51.26 (ICD-10-CM) - Lumbar disc herniation     PT Diagnosis:  lumbar spine pain with referral into LE, consistent with medical diagnosis, musimbalnce,   Visits from SOC:  1      _________________________________________________________________________________  Plan of Treatment/Functional Goals:  manual therapy, neuromuscular re-education, ROM, strengthening, stretching, transfer training, joint mobilization           Goals  Goal Identifier: walking  Goal Description: Patient will be able to ambulate x 30 minutes without sx increase 3/10 or greater.  Target Date: 06/22/23    Goal Identifier: sitting/standing  Goal Description: Patient will be able to sit/stand 45-60 min without sx 3/10 or greater.  Target Date: 06/22/23    Goal Identifier: carrying/lifting  Goal Description: Patient will be able to carry/lift 10# without sx 3/10 or greater.  Target Date: 06/22/23    Goal Identifier: HEP  Goal Description: Patient will be able to demonstrate 6 exercises without assist for progression to independent management.  Target Date: 06/22/23                                                Therapy Frequency:  1 time/week  Predicted Duration of Therapy Intervention:  8-10 weeks    Jl Shelton, PT                 I CERTIFY THE NEED FOR THESE SERVICES FURNISHED UNDER        THIS PLAN OF TREATMENT AND WHILE UNDER MY CARE     (Physician co-signature of  this document indicates review and certification of the therapy plan).                     Certification Date From:  04/27/23   Certification Date To:  06/22/23    Referring Provider:  Cady Garcia NP    Initial Assessment        See Epic Evaluation Start of Care Date: 04/27/23 04/27/23 1120   General Information   Type of Visit Initial OP Ortho PT Evaluation   Start of Care Date 04/27/23   Referring Physician Cady Garcia NP   Patient/Family Goals Statement to get better   Orders Evaluate and Treat   Date of Order 03/30/23   Certification Required? Yes   Medical Diagnosis M54.16 (ICD-10-CM) - Lumbar radiculopathy  M51.26 (ICD-10-CM) - Lumbar disc herniation   Surgical/Medical history reviewed Yes       Present Yes   Language Other  (Chelo)   Body Part(s)   Body Part(s) Lumbar Spine/SI   Presentation and Etiology   Pertinent history of current problem (include personal factors and/or comorbidities that impact the POC) Patient is a 43 year old who presents with low back pain with pain into LE occasionally down side of leg into foot (only a couple times since surgery). Patient is s/p lumbar spine surgery 02/17/2023.  Patient reports symptoms are less but still limiting decreased sx into L LE however still happens occasionally. Limited in  walking, sitting, carrying/lifting, driving, and self cares slow but limited.   Impairments A. Pain;D. Decreased ROM;E. Decreased flexibility;F. Decreased strength and endurance;C. Swelling;H. Impaired gait   Functional Limitations perform activities of daily living;perform desired leisure / sports activities   Symptom Location across low back and occasionally up spine and down into L LE   How/Where did it occur From insidious onset   Onset date of current episode/exacerbation 03/30/23   Chronicity New   Pain rating (0-10 point scale) Best (/10);Worst (/10);Other   Best (/10) 0/10  (lying)   Worst (/10) 10/10   Pain rating comment current  7/10   Pain quality F. Stabbing;C. Aching;A. Sharp   Frequency of pain/symptoms A. Constant   Pain/symptoms are: Worse during the night  (activity dependent)   Pain/symptoms exacerbated by A. Sitting;B. Walking;C. Lifting;D. Carrying;G. Certain positions;I. Bending;J. ADL;K. Home tasks   Pain/symptoms eased by C. Rest;E. Changing positions   Progression of symptoms since onset: Improved   Current / Previous Interventions   Diagnostic Tests: X-ray   X-ray Results Results   X-ray results IMPRESSION:   Five lumbar segments. Bilateral L5 pars interarticularis defects with adjacent osteophytes. Lower lumbar spine facet joint arthropathy. Interspace narrowing and osteophytes L4-L5 and L5-S1. Satisfactory height in the lumbar spine. Rightward convexity   curve apex T12. No displaced lower rib fractures. Sacral neural foramina are intact. Degenerative changes inferior SI joints. Satisfactory sacral alignment. 1 mm retrolisthesis L3-L4 and L4-L5. 3 mm anterolisthesis L5-S1.     In flexion and extension, these relationships are unchanged without high-grade instability identified. No acute fracture or posttraumatic malalignment is suspected. Hypertrophic changes, pars defects, and subluxation relationships are fairly stable from  prior CT 12/06/2022.   Current Level of Function   Patient role/employment history E. Unemployed   Fall Risk Screen   Fall screen completed by PT   Have you fallen 2 or more times in the past year? No   Have you fallen and had an injury in the past year? No   Is patient a fall risk? No   Abuse Screen (yes response referral indicated)   Feels Unsafe at Home or Work/School no   Feels Threatened by Someone no   Does Anyone Try to Keep You From Having Contact with Others or Doing Things Outside Your Home? no   Physical Signs of Abuse Present no   System Outcome Measures   Outcome Measures Low Back Pain (see Oswestry and Calixto)  (46)   Lumbar Spine/SI Objective Findings   Gait/Locomotion no notable  impairment   Hamstring Flexibility decreased B   Hip Flexor Flexibility decreaesed B   Quadricep Flexibility decreased B   Piriformis Flexibility decreased B   Flexion ROM to ankle increased sx into L LE   Extension ROM poor ROM increase sharp pain middle and across back   Right Side Bending ROM limited 50% pain lateral trunk   Left Side Bending ROM limited 50% pain lateral trunk   Lumbar ROM Comment rotation WFL   Hip Flexion (L2) Strength 4/5   Hip Abduction Strength 3+/5   Hip Adduction Strength 4/5   Hip Extension Strength 3+/5   Knee Flexion Strength 4/5   Knee Extension (L3) Strength 5/5   Ankle Dorsiflexion (L4) Strength L 4/5, R 5/5   Great Toe Extension (L5) Strength L 4/5, R 5/5   Posture slouched   Planned Therapy Interventions   Planned Therapy Interventions manual therapy;neuromuscular re-education;ROM;strengthening;stretching;transfer training;joint mobilization   Clinical Impression   Criteria for Skilled Therapeutic Interventions Met yes, treatment indicated   PT Diagnosis lumbar spine pain with referral into LE, consistent with medical diagnosis, musimbalnce,   Influenced by the following impairments poor core stability, pain, limited ROM, decreased tissue extensibility   Functional limitations due to impairments impaired walking, sitting, standing, lifting, carrying   Clinical Presentation Stable/Uncomplicated   Clinical Presentation Rationale presents as expected based on chart review   Clinical Decision Making (Complexity) Low complexity   Therapy Frequency 1 time/week   Predicted Duration of Therapy Intervention (days/wks) 8-10 weeks   Risk & Benefits of therapy have been explained Yes   Patient, Family & other staff in agreement with plan of care Yes   Clinical Impression Comments Patient presents with symptoms consistent with original injury, surgery and post op recovery. Patient will require skilled PT for decreased lumbar pain post op and decreased symptom referral into LE.   Ortho Goal 1    Goal Identifier walking   Goal Description Patient will be able to ambulate x 30 minutes without sx increase 3/10 or greater.   Goal Progress initiated   Target Date 06/22/23   Ortho Goal 2   Goal Identifier sitting/standing   Goal Description Patient will be able to sit/stand 45-60 min without sx 3/10 or greater.   Goal Progress initiated   Target Date 06/22/23   Ortho Goal 3   Goal Identifier carrying/lifting   Goal Description Patient will be able to carry/lift 10# without sx 3/10 or greater.   Goal Progress initiated   Target Date 06/22/23   Ortho Goal 4   Goal Identifier HEP   Goal Description Patient will be able to demonstrate 6 exercises without assist for progression to independent management.   Goal Progress initiated   Target Date 06/22/23   Total Evaluation Time   PT Eval, Low Complexity Minutes (45781) 30   Therapy Certification   Certification date from 04/27/23   Certification date to 06/22/23   Medical Diagnosis M54.16 (ICD-10-CM) - Lumbar radiculopathy  M51.26 (ICD-10-CM) - Lumbar disc herniation   Jl Shelton, PT

## 2023-05-01 ENCOUNTER — OFFICE VISIT (OUTPATIENT)
Dept: FAMILY MEDICINE | Facility: CLINIC | Age: 43
End: 2023-05-01
Payer: COMMERCIAL

## 2023-05-01 VITALS
OXYGEN SATURATION: 98 % | SYSTOLIC BLOOD PRESSURE: 134 MMHG | RESPIRATION RATE: 16 BRPM | BODY MASS INDEX: 26.6 KG/M2 | TEMPERATURE: 98.3 F | WEIGHT: 165.5 LBS | HEIGHT: 66 IN | HEART RATE: 91 BPM | DIASTOLIC BLOOD PRESSURE: 70 MMHG

## 2023-05-01 DIAGNOSIS — M47.816 LUMBAR FACET ARTHROPATHY: ICD-10-CM

## 2023-05-01 DIAGNOSIS — Z00.00 ROUTINE GENERAL MEDICAL EXAMINATION AT A HEALTH CARE FACILITY: Primary | ICD-10-CM

## 2023-05-01 DIAGNOSIS — E78.5 HYPERLIPIDEMIA LDL GOAL <130: ICD-10-CM

## 2023-05-01 DIAGNOSIS — M54.42 CHRONIC LEFT-SIDED LOW BACK PAIN WITH LEFT-SIDED SCIATICA: ICD-10-CM

## 2023-05-01 DIAGNOSIS — M54.16 LUMBAR RADICULOPATHY: ICD-10-CM

## 2023-05-01 DIAGNOSIS — M51.369 DDD (DEGENERATIVE DISC DISEASE), LUMBAR: ICD-10-CM

## 2023-05-01 DIAGNOSIS — K21.9 GASTROESOPHAGEAL REFLUX DISEASE WITHOUT ESOPHAGITIS: ICD-10-CM

## 2023-05-01 DIAGNOSIS — G89.29 CHRONIC LEFT-SIDED LOW BACK PAIN WITH LEFT-SIDED SCIATICA: ICD-10-CM

## 2023-05-01 PROCEDURE — 99214 OFFICE O/P EST MOD 30 MIN: CPT | Mod: 25 | Performed by: FAMILY MEDICINE

## 2023-05-01 PROCEDURE — 99396 PREV VISIT EST AGE 40-64: CPT | Performed by: FAMILY MEDICINE

## 2023-05-01 RX ORDER — ACETAMINOPHEN 500 MG
1000 TABLET ORAL 3 TIMES DAILY PRN
Qty: 100 TABLET | Refills: 4 | Status: SHIPPED | OUTPATIENT
Start: 2023-05-01 | End: 2023-08-03

## 2023-05-01 RX ORDER — ATORVASTATIN CALCIUM 20 MG/1
20 TABLET, FILM COATED ORAL AT BEDTIME
Qty: 90 TABLET | Refills: 3 | Status: SHIPPED | OUTPATIENT
Start: 2023-05-01 | End: 2023-08-03

## 2023-05-01 RX ORDER — GABAPENTIN 300 MG/1
300 CAPSULE ORAL 2 TIMES DAILY
Qty: 120 CAPSULE | Refills: 3 | Status: SHIPPED | OUTPATIENT
Start: 2023-05-01 | End: 2023-08-03

## 2023-05-01 RX ORDER — GABAPENTIN 100 MG/1
CAPSULE ORAL
COMMUNITY
Start: 2023-03-24 | End: 2023-05-01

## 2023-05-01 RX ORDER — FAMOTIDINE 20 MG/1
20 TABLET, FILM COATED ORAL 2 TIMES DAILY PRN
Qty: 120 TABLET | Refills: 4 | Status: SHIPPED | OUTPATIENT
Start: 2023-05-01 | End: 2023-08-03

## 2023-05-01 RX ORDER — NAPROXEN 500 MG/1
500 TABLET ORAL 2 TIMES DAILY PRN
Qty: 60 TABLET | Refills: 3 | Status: SHIPPED | OUTPATIENT
Start: 2023-05-01 | End: 2023-08-03

## 2023-05-01 RX ORDER — CYCLOBENZAPRINE HCL 5 MG
5 TABLET ORAL
Qty: 30 TABLET | Refills: 4 | Status: SHIPPED | OUTPATIENT
Start: 2023-05-01 | End: 2023-08-03

## 2023-05-01 ASSESSMENT — ENCOUNTER SYMPTOMS
FREQUENCY: 0
COUGH: 0
ARTHRALGIAS: 0
NAUSEA: 0
HEARTBURN: 1
ABDOMINAL PAIN: 0
HEADACHES: 0
PALPITATIONS: 0
SORE THROAT: 0
CHILLS: 0
MYALGIAS: 0
FEVER: 0
JOINT SWELLING: 0
EYE PAIN: 0
NERVOUS/ANXIOUS: 0
DIARRHEA: 0
CONSTIPATION: 0
SHORTNESS OF BREATH: 0
DYSURIA: 0
HEMATURIA: 0
PARESTHESIAS: 0
HEMATOCHEZIA: 0
DIZZINESS: 0
WEAKNESS: 0

## 2023-05-01 NOTE — PROGRESS NOTES
SUBJECTIVE:   CC: Beh is an 43 year old who presents for preventative health visit.     Patient has been advised of split billing requirements and indicates understanding: Yes  Healthy Habits:     Getting at least 3 servings of Calcium per day:  Yes    Bi-annual eye exam:  NO    Dental care twice a year:  Yes    Sleep apnea or symptoms of sleep apnea:  None    Diet:  Regular (no restrictions)    Frequency of exercise:  None    PHQ-2 Total Score: 0    Additional concerns today:  Yes    Heartburn usually in the middle of the night.   Unsure of triggers.   Can happen during the day as well.     The 10-year ASCVD risk score (Tristan FLORES, et al., 2019) is: 11.5%    Values used to calculate the score:      Age: 43 years      Sex: Male      Is Non- : No      Diabetic: No      Tobacco smoker: Yes      Systolic Blood Pressure: 134 mmHg      Is BP treated: No      HDL Cholesterol: 40 mg/dL      Total Cholesterol: 271 mg/dL      Today's PHQ-2 Score:       5/1/2023     8:37 AM   PHQ-2 ( 1999 [employer])   Q1: Little interest or pleasure in doing things 0   Q2: Feeling down, depressed or hopeless 0   PHQ-2 Score 0   Q1: Little interest or pleasure in doing things Not at all   Q2: Feeling down, depressed or hopeless Not at all   PHQ-2 Score 0       Have you ever done Advance Care Planning? (For example, a Health Directive, POLST, or a discussion with a medical provider or your loved ones about your wishes): No, advance care planning information given to patient to review.  Patient plans to discuss their wishes with loved ones or provider.      Social History     Tobacco Use     Smoking status: Every Day     Types: Cigarettes     Passive exposure: Never     Smokeless tobacco: Former     Types: Chew     Tobacco comments:     chews betel nut w/o tobacco, trying to quit   Vaping Use     Vaping status: Never Used   Substance Use Topics     Alcohol use: Yes     Comment: Alcoholic Drinks/day: sometimes              "5/1/2023     8:37 AM   Alcohol Use   Prescreen: >3 drinks/day or >7 drinks/week? Not Applicable          View : No data to display.                Last PSA: No results found for: PSA    Reviewed orders with patient. Reviewed health maintenance and updated orders accordingly - Yes  Lab work is in process  Labs reviewed in EPIC    Reviewed and updated as needed this visit by clinical staff   Tobacco  Allergies  Meds  Problems  Med Hx  Surg Hx  Fam Hx          Reviewed and updated as needed this visit by Provider   Tobacco  Allergies  Meds  Problems  Med Hx  Surg Hx  Fam Hx             Review of Systems   Constitutional: Negative for chills and fever.   HENT: Negative for congestion, ear pain, hearing loss and sore throat.    Eyes: Negative for pain and visual disturbance.   Respiratory: Negative for cough and shortness of breath.    Cardiovascular: Negative for chest pain, palpitations and peripheral edema.   Gastrointestinal: Positive for heartburn. Negative for abdominal pain, constipation, diarrhea, hematochezia and nausea.   Genitourinary: Negative for dysuria, frequency, genital sores, hematuria, impotence, penile discharge and urgency.   Musculoskeletal: Negative for arthralgias, joint swelling and myalgias.   Skin: Negative for rash.   Neurological: Negative for dizziness, weakness, headaches and paresthesias.   Psychiatric/Behavioral: Negative for mood changes. The patient is not nervous/anxious.      OBJECTIVE:   /70   Pulse 91   Temp 98.3  F (36.8  C) (Oral)   Resp 16   Ht 1.67 m (5' 5.75\")   Wt 75.1 kg (165 lb 8 oz)   SpO2 98%   BMI 26.92 kg/m      Physical Exam  GENERAL: healthy, alert and no distress  NECK: no adenopathy, no asymmetry, masses, or scars and thyroid normal to palpation  RESP: lungs clear to auscultation - no rales, rhonchi or wheezes  CV: regular rate and rhythm, normal S1 S2, no S3 or S4, no murmur, click or rub, no peripheral edema and peripheral pulses " strong  ABDOMEN: soft, nontender, no hepatosplenomegaly, no masses and bowel sounds normal  MS: no gross musculoskeletal defects noted, no edema. Stiff paraspinous muscles in lumbar region. Surgical incision well healed.     Diagnostic Test Results:  Labs reviewed in Epic    ASSESSMENT/PLAN:   Beh was seen today for physical and gastrophageal reflux.    Diagnoses and all orders for this visit:    Routine general medical examination at a health care facility    Lumbar facet arthropathy  Lumbar radiculopathy  DDD (degenerative disc disease), lumbar  Chronic left-sided low back pain with left-sided sciatica  Ok to restart medications for back pain while he does PT. He stopped everything when they ran out a few weeks ago.   -     gabapentin (NEURONTIN) 300 MG capsule; Take 1 capsule (300 mg) by mouth 2 times daily  -     cyclobenzaprine (FLEXERIL) 5 MG tablet; Take 1 tablet (5 mg) by mouth nightly as needed for muscle spasms  -     naproxen (NAPROSYN) 500 MG tablet; Take 1 tablet (500 mg) by mouth 2 times daily as needed for moderate pain (take with food)  -     acetaminophen (TYLENOL) 500 MG tablet; Take 2 tablets (1,000 mg) by mouth 3 times daily as needed for pain    Gastroesophageal reflux disease without esophagitis  2 times per week heart burn really bad, has never been on meds for it. Will trial 1 month omeprazole with prn famotidine and follow up in 1-2 months. If no improvement consider testing hpyori  -     omeprazole (PRILOSEC) 20 MG DR capsule; Take 1 capsule (20 mg) by mouth daily  -     famotidine (PEPCID) 20 MG tablet; Take 1 tablet (20 mg) by mouth 2 times daily as needed (acid reflux)    Hyperlipidemia LDL goal <130  -     atorvastatin (LIPITOR) 20 MG tablet; Take 1 tablet (20 mg) by mouth At Bedtime    Other orders  -     PRIMARY CARE FOLLOW-UP SCHEDULING; Future    we could check lipids in 3 months if he wants, but only if significant changes made    Patient has been advised of split billing  requirements and indicates understanding: Yes      COUNSELING:   Reviewed preventive health counseling, as reflected in patient instructions        He reports that he has been smoking cigarettes. He has never been exposed to tobacco smoke. He has quit using smokeless tobacco.  His smokeless tobacco use included chew.  Nicotine/Tobacco Cessation Plan:   Information offered: Patient not interested at this time        Sonia Wu MD  [facility]

## 2023-05-04 ENCOUNTER — HOSPITAL ENCOUNTER (OUTPATIENT)
Dept: PHYSICAL THERAPY | Facility: REHABILITATION | Age: 43
Discharge: HOME | End: 2023-05-04
Payer: COMMERCIAL

## 2023-05-04 DIAGNOSIS — M54.16 LUMBAR RADICULOPATHY: Primary | ICD-10-CM

## 2023-05-04 DIAGNOSIS — M51.26 LUMBAR DISC HERNIATION: ICD-10-CM

## 2023-05-04 PROCEDURE — 97110 THERAPEUTIC EXERCISES: CPT | Mod: GP | Performed by: PHYSICAL THERAPIST

## 2023-05-04 PROCEDURE — 97140 MANUAL THERAPY 1/> REGIONS: CPT | Mod: GP | Performed by: PHYSICAL THERAPIST

## 2023-05-11 ENCOUNTER — HOSPITAL ENCOUNTER (OUTPATIENT)
Dept: PHYSICAL THERAPY | Facility: REHABILITATION | Age: 43
Discharge: HOME | End: 2023-05-11
Payer: COMMERCIAL

## 2023-05-11 DIAGNOSIS — M51.26 LUMBAR DISC HERNIATION: ICD-10-CM

## 2023-05-11 DIAGNOSIS — M54.16 LUMBAR RADICULOPATHY: Primary | ICD-10-CM

## 2023-05-11 PROCEDURE — 97140 MANUAL THERAPY 1/> REGIONS: CPT | Mod: GP | Performed by: PHYSICAL THERAPIST

## 2023-05-11 PROCEDURE — 97110 THERAPEUTIC EXERCISES: CPT | Mod: GP | Performed by: PHYSICAL THERAPIST

## 2023-05-18 ENCOUNTER — THERAPY VISIT (OUTPATIENT)
Dept: PHYSICAL THERAPY | Facility: REHABILITATION | Age: 43
End: 2023-05-18
Payer: COMMERCIAL

## 2023-05-18 DIAGNOSIS — M54.42 CHRONIC LEFT-SIDED LOW BACK PAIN WITH LEFT-SIDED SCIATICA: ICD-10-CM

## 2023-05-18 DIAGNOSIS — M51.369 DDD (DEGENERATIVE DISC DISEASE), LUMBAR: ICD-10-CM

## 2023-05-18 DIAGNOSIS — M54.16 LUMBAR RADICULOPATHY: Primary | ICD-10-CM

## 2023-05-18 DIAGNOSIS — G89.29 CHRONIC LEFT-SIDED LOW BACK PAIN WITH LEFT-SIDED SCIATICA: ICD-10-CM

## 2023-05-18 DIAGNOSIS — M47.816 LUMBAR FACET ARTHROPATHY: ICD-10-CM

## 2023-05-18 DIAGNOSIS — M51.26 LUMBAR DISC HERNIATION: ICD-10-CM

## 2023-05-18 PROCEDURE — 97110 THERAPEUTIC EXERCISES: CPT | Mod: GP | Performed by: PHYSICAL THERAPIST

## 2023-06-01 ENCOUNTER — THERAPY VISIT (OUTPATIENT)
Dept: PHYSICAL THERAPY | Facility: REHABILITATION | Age: 43
End: 2023-06-01
Payer: COMMERCIAL

## 2023-06-01 DIAGNOSIS — G89.29 CHRONIC LEFT-SIDED LOW BACK PAIN WITH LEFT-SIDED SCIATICA: ICD-10-CM

## 2023-06-01 DIAGNOSIS — M54.16 LUMBAR RADICULOPATHY: Primary | ICD-10-CM

## 2023-06-01 DIAGNOSIS — M54.42 CHRONIC LEFT-SIDED LOW BACK PAIN WITH LEFT-SIDED SCIATICA: ICD-10-CM

## 2023-06-01 DIAGNOSIS — M51.369 DDD (DEGENERATIVE DISC DISEASE), LUMBAR: ICD-10-CM

## 2023-06-01 DIAGNOSIS — M51.26 LUMBAR DISC HERNIATION: ICD-10-CM

## 2023-06-01 DIAGNOSIS — M47.816 LUMBAR FACET ARTHROPATHY: ICD-10-CM

## 2023-06-01 PROCEDURE — 97110 THERAPEUTIC EXERCISES: CPT | Mod: GP | Performed by: PHYSICAL THERAPIST

## 2023-06-08 ENCOUNTER — THERAPY VISIT (OUTPATIENT)
Dept: PHYSICAL THERAPY | Facility: REHABILITATION | Age: 43
End: 2023-06-08
Payer: COMMERCIAL

## 2023-06-08 DIAGNOSIS — M54.42 CHRONIC LEFT-SIDED LOW BACK PAIN WITH LEFT-SIDED SCIATICA: ICD-10-CM

## 2023-06-08 DIAGNOSIS — M47.816 LUMBAR FACET ARTHROPATHY: ICD-10-CM

## 2023-06-08 DIAGNOSIS — M54.16 LUMBAR RADICULOPATHY: Primary | ICD-10-CM

## 2023-06-08 DIAGNOSIS — M51.369 DDD (DEGENERATIVE DISC DISEASE), LUMBAR: ICD-10-CM

## 2023-06-08 DIAGNOSIS — G89.29 CHRONIC LEFT-SIDED LOW BACK PAIN WITH LEFT-SIDED SCIATICA: ICD-10-CM

## 2023-06-08 DIAGNOSIS — M51.26 LUMBAR DISC HERNIATION: ICD-10-CM

## 2023-06-08 PROCEDURE — 97530 THERAPEUTIC ACTIVITIES: CPT | Mod: GP | Performed by: PHYSICAL THERAPIST

## 2023-06-08 PROCEDURE — 97110 THERAPEUTIC EXERCISES: CPT | Mod: GP | Performed by: PHYSICAL THERAPIST

## 2023-06-15 ENCOUNTER — THERAPY VISIT (OUTPATIENT)
Dept: PHYSICAL THERAPY | Facility: REHABILITATION | Age: 43
End: 2023-06-15
Payer: COMMERCIAL

## 2023-06-15 DIAGNOSIS — M51.26 LUMBAR DISC HERNIATION: ICD-10-CM

## 2023-06-15 DIAGNOSIS — G89.29 CHRONIC LEFT-SIDED LOW BACK PAIN WITH LEFT-SIDED SCIATICA: ICD-10-CM

## 2023-06-15 DIAGNOSIS — M54.16 LUMBAR RADICULOPATHY: Primary | ICD-10-CM

## 2023-06-15 DIAGNOSIS — M51.369 DDD (DEGENERATIVE DISC DISEASE), LUMBAR: ICD-10-CM

## 2023-06-15 DIAGNOSIS — M47.816 LUMBAR FACET ARTHROPATHY: ICD-10-CM

## 2023-06-15 DIAGNOSIS — M54.42 CHRONIC LEFT-SIDED LOW BACK PAIN WITH LEFT-SIDED SCIATICA: ICD-10-CM

## 2023-06-15 PROCEDURE — 97110 THERAPEUTIC EXERCISES: CPT | Mod: GP | Performed by: PHYSICAL THERAPIST

## 2023-06-15 PROCEDURE — 97530 THERAPEUTIC ACTIVITIES: CPT | Mod: GP | Performed by: PHYSICAL THERAPIST

## 2023-07-06 NOTE — PROGRESS NOTES
DISCHARGE  Reason for Discharge: Patient did not show for 2 appointments, discharge per cancellation no show policy.      Discharge Plan: Patient to continue home program.    Referring Provider:  Cady Garcia        06/15/23 0500   Appointment Info   Signing clinician's name / credentials Bina Shelton DPT, PT   Visits Used 7/8-10   Medical Diagnosis M54.16 (ICD-10-CM) - Lumbar radiculopathy  M51.26 (ICD-10-CM) - Lumbar disc herniation   PT Tx Diagnosis lumbar spine pain with referral into LE, consistent with medical diagnosis, musimbalnce,   Progress Note/Certification   Start of Care Date 04/27/23   Onset of illness/injury or Date of Surgery 03/30/23   Therapy Frequency 1 x weekly   Predicted Duration 8-10 sessions   Certification date from 04/27/23   Certification date to 06/22/23   Progress Note Due Date 06/22/23       Present Yes   Preferred Language DANNI    ID [id number]   PT Goal 1   Goal Identifier walking   Goal Description Patient will be able to ambulate x 30 minutes without sx increase 3/10 or greater.   Rationale to maximize safety and independence within the community  (and work)   Goal Progress patient has been walking to park and reports just a little bit of pain 4-5/10   Target Date 06/22/23   PT Goal 2   Goal Identifier sitting/standing   Goal Description Patient will be able to sit/stand 45-60 min without sx 3/10 or greater.   Rationale to maximize safety and independence within the community  (and return to work)   Goal Progress legs and hip still achey with long periods of sitting or standing   Target Date 06/22/23   PT Goal 3   Goal Identifier carrying/lifting   Goal Description Patient will be able to carry/lift 10# without sx 3/10 or greater.   Rationale to maximize safety and independence within the community  (and work)   Goal Progress in progress   Target Date 06/22/23   PT Goal 4   Goal Identifier HEP   Goal Description Patient will be able to  demonstrate 6 exercises without assist for progression to independent management.   Rationale to maximize safety and independence within the community  (home and work)   Goal Progress continues to require revision or progression   Target Date 06/22/23   Subjective Report   Subjective Report Continues to feel better since starting physical therapy. No pain with self cares.  Has the pain down left leg 5/10 comes and goes sit for a long time or stand for a long time and does PT exercises gets less painful. Able to complete all household activities without increased sx. Not waking at all due to pain. Still avoiding doing leisure activities with friends. Continues to walk to the park with his kids. Overall feels better. Exercises continue to help. Walking the pain is less., Sitting/standing, and carry/lift light weight continues to get better. Taking pain medication only as needed.. Hasn't been taking tylenol for pain. Feels like he is about 70% normal.   Objective Measure 1   Objective Measure SI joint assessment   Details not assessed today   Objective Measure 2   Objective Measure lumbar ROM   Details flexion WFL ache into leg, ext WFL ache/throb L side no change with symptoms into leg, R SB WFL to knee joint line L side ache/sharp/throbbing, L SB same symptoms throbbing pain WFL   Therapeutic Procedure/Exercise   Therapeutic Procedures: strength, endurance, ROM, flexibillity minutes (64864) 35   Ther Proc 1 NU step level 7 x 6 minutes for increased stregnth and endurance, subjective measures taken   Ther Proc 1 - Details 427 steps, increased endurance and strength   Ther Proc 2 standing step out with resistance for lumbar paraspinals   Ther Proc 2 - Details level 3 band x 10 reps (2 steps out & back)   Ther Proc 3 seated rotation with band   Ther Proc 3 - Details level 1 band x 10 each direction   PTRx Ther Proc 1 Supine Lumbar Hip Roll   PTRx Ther Proc 1 - Details not completed at todays visit   PTRx Ther Proc 2  Supine Hamstring Stretch   PTRx Ther Proc 2 - Details not completed at todays visit   PTRx Ther Proc 3 Bridging #1   PTRx Ther Proc 3 - Details not completed during todays visit   PTRx Ther Proc 4 Piriformis Stretch Below 90 Degrees Supine   PTRx Ther Proc 4 - Details not completed at todays visit   PTRx Ther Proc 5 Hip Flexion Straight Leg Raise   PTRx Ther Proc 5 - Details not completed today   PTRx Ther Proc 6 Clamshell Feet together   PTRx Ther Proc 6 - Details not completed today   PTRx Ther Proc 7 Supine MET For Anterior Posterior Innominate Rotation   PTRx Ther Proc 7 - Details not completed at todays visit   PTRx Ther Proc 8 Quadratus Lumborum Stretch   PTRx Ther Proc 8 - Details not completed at todays visit   PTRx Ther Proc 9 Supine Abdominal Exercise #8 (Toe Taps)   PTRx Ther Proc 9 - Details not completed at todays visit   Skilled Intervention all for improved support and stability at pelvis and lumbar spine decreasing sx at lumbar spine improved mobility for return to work, lifting techniques for decreased risk of injury to low back   Patient Response/Progress improved comfort and mobility, decreased symptoms   PTRx Ther Proc 10 Sidestep   PTRx Ther Proc 10 - Details lateral steps with band for lumbar paraspinals level 3 band x 10 steps out and back   Therapeutic Activity   Therapeutic Activities: dynamic activities to improve functional performance minutes (31913) 15   Ther Act 1 crate 7.8 # lift small squat & carry 10 feet   Ther Act 1 - Details not completed today   Skilled Intervention safe lifting techinique, lifting tolerance   PTRx Ther Act 1 Body Mechanics - Half Kneel Lift   PTRx Ther Act 1 - Details practiced with yoga block x 5    PTRx Ther Act 2 Body Mechanics - Overhead Lift   PTRx Ther Act 2 - Details practiced with yoga block x 5    PTRx Ther Act 3 Body Mechanics - Full Squat   PTRx Ther Act 3 - Details practiced with yoga block x 5 educated on need to keep items close when lifting   PTRx  Ther Act 4 Body Mechanics - Waiters Celeste   PTRx Ther Act 4 - Details practiced with yoga block x 5    PTRx Ther Act 5 Body Mechanics - Golfers Lift   PTRx Ther Act 5 - Details practiced with yoga block x 5    PTRx Ther Act 6 Education Sheet General   PTRx Ther Act 6 - Details seated rotation for back- close band in door chest height wrap around shoulders turn into band green band level 3 progress to blue after 4/5 days if green is feeling easy x 10 each direction   Neuromuscular Re-education   PTRx Neuro Re-ed 1 Abdominal Brace Transverse Abdominis   PTRx Neuro Re-ed 1 - Details not completed at todays visit   Manual Therapy   Skilled Intervention STW   Patient Response/Progress verbal understanding and demonstration   Treatment Detail not completed at todays visit   Education   Learner/Method Patient   Plan   Homework LTR, TrA brace, hamstring 90/90 + ankle pumps for nerve glides, bridge, piriformis stretch,   Home program PTRx   Plan for next session progression of lumbar spine and abdominal stability, review and progress hip strength   Comments   Comments Patient continues to lack core stability and lumbar spine support from musculature. Will require further skilled PT for progression to return to work and leisure activities.   Total Session Time   Timed Code Treatment Minutes 50   Total Treatment Time (sum of timed and untimed services) 50   Medicare Claim Information   Medical Diagnosis M54.16 (ICD-10-CM) - Lumbar radiculopathy  M51.26 (ICD-10-CM) - Lumbar disc herniation   PT Diagnosis lumbar spine pain with referral into LE, consistent with medical diagnosis, musimbalnce,   Start of Care Date 04/27/23   Onset date of current episode/exacerbation 03/30/23   Certification date from 04/27/23   Ortho Goal 1   Goal Identifier walking   Goal Description Patient will be able to ambulate x 30 minutes without sx increase 3/10 or greater.   Goal Progress in progress   Target Date 06/22/23   Ortho Goal 2   Goal  Identifier sitting/standing   Goal Description Patient will be able to sit/stand 45-60 min without sx 3/10 or greater.   Goal Progress in progress   Target Date 06/22/23   Ortho Goal 3   Goal Identifier carrying/lifting   Goal Description Patient will be able to carry/lift 10# without sx 3/10 or greater.   Goal Progress in progress   Target Date 06/22/23   Ortho Goal 4   Goal Identifier HEP   Goal Description Patient will be able to demonstrate 6 exercises without assist for progression to independent management.   Goal Progress in progress   Target Date 06/22/23   Jl Shelton, PT

## 2023-07-06 NOTE — PROGRESS NOTES
DISCHARGE  Reason for Discharge: Patient did not show for 2 visits.        Discharge Plan: Patient to continue home program.    Referring Provider:  Cady Garcia

## 2023-07-07 ENCOUNTER — THERAPY VISIT (OUTPATIENT)
Dept: PHYSICAL THERAPY | Facility: REHABILITATION | Age: 43
End: 2023-07-07
Payer: COMMERCIAL

## 2023-07-07 DIAGNOSIS — G89.29 CHRONIC LEFT-SIDED LOW BACK PAIN WITH LEFT-SIDED SCIATICA: ICD-10-CM

## 2023-07-07 DIAGNOSIS — M54.42 CHRONIC LEFT-SIDED LOW BACK PAIN WITH LEFT-SIDED SCIATICA: ICD-10-CM

## 2023-07-07 DIAGNOSIS — M51.369 DDD (DEGENERATIVE DISC DISEASE), LUMBAR: ICD-10-CM

## 2023-07-07 DIAGNOSIS — M47.816 LUMBAR FACET ARTHROPATHY: ICD-10-CM

## 2023-07-07 DIAGNOSIS — M54.16 LUMBAR RADICULOPATHY: Primary | ICD-10-CM

## 2023-07-07 DIAGNOSIS — M51.26 LUMBAR DISC HERNIATION: ICD-10-CM

## 2023-07-07 PROCEDURE — 97110 THERAPEUTIC EXERCISES: CPT | Mod: GP | Performed by: PHYSICAL THERAPIST

## 2023-07-07 NOTE — PROGRESS NOTES
[facility]                                                                                   OUTPATIENT PHYSICAL THERAPY      PLAN OF TREATMENT FOR OUTPATIENT REHABILITATION   Patient's Last Name, First Name, M.I. Dwe,Beh G YOB: 1980   Provider's Name   [facility]   Medical Record No.  [id number]     Onset Date: 03/30/23  Start of Care Date: 04/27/23     Medical Diagnosis:  M54.16 (ICD-10-CM) - Lumbar radiculopathy  M51.26 (ICD-10-CM) - Lumbar disc herniation      PT Treatment Diagnosis:  lumbar spine pain with referral into LE, consistent with medical diagnosis, musimbalnce, Plan of Treatment  Frequency/Duration: 1 x every 1-2 weeeks/ 8-10 weeks    Certification date from 06/22/2023 to 10/01/2023         See note for plan of treatment details and functional goals     Jl Shelton, PT                         I CERTIFY THE NEED FOR THESE SERVICES FURNISHED UNDER        THIS PLAN OF TREATMENT AND WHILE UNDER MY CARE     (Physician attestation of this document indicates review and certification of the therapy plan).                Referring Provider:  Cady Garcia      Initial Assessment  See Epic Evaluation- Start of Care Date: 04/27/23            PLAN  1 x per week every 1-2 weeks over 10 weeks    Beginning/End Dates of Progress Note Reporting Period:    04/27/2023 to 06/15/2023    Referring Provider:  Cady Garcia       06/15/23 0500   Appointment Info   Signing clinician's name / credentials Bina Shelton DPT, PT   Visits Used 7/8-10   Medical Diagnosis M54.16 (ICD-10-CM) - Lumbar radiculopathy  M51.26 (ICD-10-CM) - Lumbar disc herniation   PT Tx Diagnosis lumbar spine pain with referral into LE, consistent with medical diagnosis, musimbalnce,   Progress Note/Certification   Start of Care Date 04/27/23   Onset of illness/injury or Date of Surgery 03/30/23   Therapy Frequency 1 x weekly   Predicted Duration 8-10  sessions   Certification date from 06/23/2023   Certification date to 10/01/2023   Progress Note Due Date 10/01/2023       Present Yes   Preferred Language DANNI    ID [id number]   PT Goal 1   Goal Identifier walking   Goal Description Patient will be able to ambulate x 30 minutes without sx increase 3/10 or greater.   Rationale to maximize safety and independence within the community  (and work)   Goal Progress patient has been walking to park and reports just a little bit of pain 4-5/10   Target Date 10/01/2023   PT Goal 2   Goal Identifier sitting/standing   Goal Description Patient will be able to sit/stand 45-60 min without sx 3/10 or greater.   Rationale to maximize safety and independence within the community  (and return to work)   Goal Progress legs and hip still achey with long periods of sitting or standing   Target Date 10/01/2023   PT Goal 3   Goal Identifier carrying/lifting   Goal Description Patient will be able to carry/lift 10# without sx 3/10 or greater.   Rationale to maximize safety and independence within the community  (and work)   Goal Progress in progress   Target Date 10/01/2023   PT Goal 4   Goal Identifier HEP   Goal Description Patient will be able to demonstrate 6 exercises without assist for progression to independent management.   Rationale to maximize safety and independence within the community  (home and work)   Goal Progress continues to require revision or progression   Target Date 10/01/2023   Subjective Report   Subjective Report Continues to feel better since starting physical therapy. No pain with self cares.  Has the pain down left leg 5/10 comes and goes sit for a long time or stand for a long time and does PT exercises gets less painful. Able to complete all household activities without increased sx. Not waking at all due to pain. Still avoiding doing leisure activities with friends. Continues to walk to the park with his kids. Overall feels  better. Exercises continue to help. Walking the pain is less., Sitting/standing, and carry/lift light weight continues to get better. Taking pain medication only as needed.. Hasn't been taking tylenol for pain. Feels like he is about 70% normal.   Objective Measure 1   Objective Measure SI joint assessment   Details not assessed today   Objective Measure 2   Objective Measure lumbar ROM   Details flexion WFL ache into leg, ext WFL ache/throb L side no change with symptoms into leg, R SB WFL to knee joint line L side ache/sharp/throbbing, L SB same symptoms throbbing pain WFL   Therapeutic Procedure/Exercise   Therapeutic Procedures: strength, endurance, ROM, flexibillity minutes (38371) 35   Ther Proc 1 NU step level 7 x 6 minutes for increased stregnth and endurance, subjective measures taken   Ther Proc 1 - Details 427 steps, increased endurance and strength   Ther Proc 2 standing step out with resistance for lumbar paraspinals   Ther Proc 2 - Details level 3 band x 10 reps (2 steps out & back)   Ther Proc 3 seated rotation with band   Ther Proc 3 - Details level 1 band x 10 each direction   PTRx Ther Proc 1 Supine Lumbar Hip Roll   PTRx Ther Proc 1 - Details not completed at todays visit   PTRx Ther Proc 2 Supine Hamstring Stretch   PTRx Ther Proc 2 - Details not completed at todays visit   PTRx Ther Proc 3 Bridging #1   PTRx Ther Proc 3 - Details not completed during todays visit   PTRx Ther Proc 4 Piriformis Stretch Below 90 Degrees Supine   PTRx Ther Proc 4 - Details not completed at todays visit   PTRx Ther Proc 5 Hip Flexion Straight Leg Raise   PTRx Ther Proc 5 - Details not completed today   PTRx Ther Proc 6 Clamshell Feet together   PTRx Ther Proc 6 - Details not completed today   PTRx Ther Proc 7 Supine MET For Anterior Posterior Innominate Rotation   PTRx Ther Proc 7 - Details not completed at todays visit   PTRx Ther Proc 8 Quadratus Lumborum Stretch   PTRx Ther Proc 8 - Details not completed at todays  visit   PTRx Ther Proc 9 Supine Abdominal Exercise #8 (Toe Taps)   PTRx Ther Proc 9 - Details not completed at todays visit   Skilled Intervention all for improved support and stability at pelvis and lumbar spine decreasing sx at lumbar spine improved mobility for return to work, lifting techniques for decreased risk of injury to low back   Patient Response/Progress improved comfort and mobility, decreased symptoms   PTRx Ther Proc 10 Sidestep   PTRx Ther Proc 10 - Details lateral steps with band for lumbar paraspinals level 3 band x 10 steps out and back   Therapeutic Activity   Therapeutic Activities: dynamic activities to improve functional performance minutes (49277) 15   Ther Act 1 crate 7.8 # lift small squat & carry 10 feet   Ther Act 1 - Details not completed today   Skilled Intervention safe lifting techinique, lifting tolerance   PTRx Ther Act 1 Body Mechanics - Half Kneel Lift   PTRx Ther Act 1 - Details practiced with yoga block x 5    PTRx Ther Act 2 Body Mechanics - Overhead Lift   PTRx Ther Act 2 - Details practiced with yoga block x 5    PTRx Ther Act 3 Body Mechanics - Full Squat   PTRx Ther Act 3 - Details practiced with yoga block x 5 educated on need to keep items close when lifting   PTRx Ther Act 4 Body Mechanics - [place]   PTRx Ther Act 4 - Details practiced with yoga block x 5    PTRx Ther Act 5 Body Mechanics - Golfers Lift   PTRx Ther Act 5 - Details practiced with yoga block x 5    PTRx Ther Act 6 Education Sheet General   PTRx Ther Act 6 - Details seated rotation for back- close band in door chest height wrap around shoulders turn into band green band level 3 progress to blue after 4/5 days if green is feeling easy x 10 each direction   Neuromuscular Re-education   PTRx Neuro Re-ed 1 Abdominal Brace Transverse Abdominis   PTRx Neuro Re-ed 1 - Details not completed at todays visit   Manual Therapy   Skilled Intervention STW   Patient Response/Progress verbal understanding and  demonstration   Treatment Detail not completed at todays visit   Education   Learner/Method Patient   Plan   Homework LTR, TrA brace, hamstring 90/90 + ankle pumps for nerve glides, bridge, piriformis stretch,   Home program PTRx   Plan for next session progression of lumbar spine and abdominal stability, review and progress hip strength   Comments   Comments Patient continues to lack core stability and lumbar spine support from musculature. Will require further skilled PT for progression to return to work and leisure activities.   Total Session Time   Timed Code Treatment Minutes 50   Total Treatment Time (sum of timed and untimed services) 50   Medicare Claim Information   Medical Diagnosis M54.16 (ICD-10-CM) - Lumbar radiculopathy  M51.26 (ICD-10-CM) - Lumbar disc herniation   PT Diagnosis lumbar spine pain with referral into LE, consistent with medical diagnosis, musimbalnce,   Start of Care Date 04/27/23   Onset date of current episode/exacerbation 03/30/23   Certification date from 04/27/23   Ortho Goal 1   Goal Identifier walking   Goal Description Patient will be able to ambulate x 30 minutes without sx increase 3/10 or greater.   Goal Progress in progress   Target Date 06/22/23   Ortho Goal 2   Goal Identifier sitting/standing   Goal Description Patient will be able to sit/stand 45-60 min without sx 3/10 or greater.   Goal Progress in progress   Target Date 06/22/23   Ortho Goal 3   Goal Identifier carrying/lifting   Goal Description Patient will be able to carry/lift 10# without sx 3/10 or greater.   Goal Progress in progress   Target Date 06/22/23   Ortho Goal 4   Goal Identifier HEP   Goal Description Patient will be able to demonstrate 6 exercises without assist for progression to independent management.   Goal Progress in progress   Target Date 06/22/23   Jl Shelton, PT

## 2023-08-03 ENCOUNTER — OFFICE VISIT (OUTPATIENT)
Dept: FAMILY MEDICINE | Facility: CLINIC | Age: 43
End: 2023-08-03
Payer: COMMERCIAL

## 2023-08-03 VITALS
BODY MASS INDEX: 26.26 KG/M2 | WEIGHT: 163.4 LBS | OXYGEN SATURATION: 98 % | SYSTOLIC BLOOD PRESSURE: 116 MMHG | DIASTOLIC BLOOD PRESSURE: 75 MMHG | TEMPERATURE: 99.2 F | HEART RATE: 76 BPM | HEIGHT: 66 IN | RESPIRATION RATE: 14 BRPM

## 2023-08-03 DIAGNOSIS — G89.29 CHRONIC LEFT-SIDED LOW BACK PAIN WITH LEFT-SIDED SCIATICA: ICD-10-CM

## 2023-08-03 DIAGNOSIS — M54.42 CHRONIC LEFT-SIDED LOW BACK PAIN WITH LEFT-SIDED SCIATICA: ICD-10-CM

## 2023-08-03 DIAGNOSIS — E78.5 HYPERLIPIDEMIA LDL GOAL <130: ICD-10-CM

## 2023-08-03 DIAGNOSIS — M54.16 LUMBAR RADICULOPATHY: ICD-10-CM

## 2023-08-03 DIAGNOSIS — M47.816 LUMBAR FACET ARTHROPATHY: ICD-10-CM

## 2023-08-03 DIAGNOSIS — M51.369 DDD (DEGENERATIVE DISC DISEASE), LUMBAR: ICD-10-CM

## 2023-08-03 DIAGNOSIS — K21.9 GASTROESOPHAGEAL REFLUX DISEASE WITHOUT ESOPHAGITIS: ICD-10-CM

## 2023-08-03 PROCEDURE — 99214 OFFICE O/P EST MOD 30 MIN: CPT | Performed by: FAMILY MEDICINE

## 2023-08-03 RX ORDER — ACETAMINOPHEN 500 MG
1000 TABLET ORAL 3 TIMES DAILY PRN
Qty: 100 TABLET | Refills: 4 | Status: SHIPPED | OUTPATIENT
Start: 2023-08-03 | End: 2024-01-24

## 2023-08-03 RX ORDER — CYCLOBENZAPRINE HCL 5 MG
5 TABLET ORAL
Qty: 30 TABLET | Refills: 4 | Status: SHIPPED | OUTPATIENT
Start: 2023-08-03 | End: 2024-01-24

## 2023-08-03 RX ORDER — FAMOTIDINE 20 MG/1
20 TABLET, FILM COATED ORAL 2 TIMES DAILY PRN
Qty: 120 TABLET | Refills: 4 | Status: SHIPPED | OUTPATIENT
Start: 2023-08-03 | End: 2024-01-24 | Stop reason: ALTCHOICE

## 2023-08-03 RX ORDER — NAPROXEN 500 MG/1
500 TABLET ORAL 2 TIMES DAILY PRN
Qty: 60 TABLET | Refills: 3 | Status: SHIPPED | OUTPATIENT
Start: 2023-08-03 | End: 2024-01-24 | Stop reason: SINTOL

## 2023-08-03 RX ORDER — GABAPENTIN 300 MG/1
300 CAPSULE ORAL 2 TIMES DAILY
Qty: 120 CAPSULE | Refills: 3 | Status: SHIPPED | OUTPATIENT
Start: 2023-08-03 | End: 2024-05-22

## 2023-08-03 RX ORDER — ATORVASTATIN CALCIUM 20 MG/1
20 TABLET, FILM COATED ORAL AT BEDTIME
Qty: 90 TABLET | Refills: 3 | Status: SHIPPED | OUTPATIENT
Start: 2023-08-03 | End: 2024-01-24

## 2023-08-03 NOTE — PROGRESS NOTES
"  Assessment & Plan     DDD (degenerative disc disease), lumbar  Lumbar facet arthropathy  Chronic left-sided low back pain with left-sided sciatica  Lumbar radiculopathy  Patient has had overall improvement but still pain when trying to lift things. I dont know how much he can lift at this time, he continues to see PT. Will review their notes. Patient says he would like to follow up with neurosurgery to see if he is progressing well enough. If he gets clearance to go back to work from surgery, I can evaluate patient for his limitations before he returns to work.   - acetaminophen (TYLENOL) 500 MG tablet  Dispense: 100 tablet; Refill: 4  - cyclobenzaprine (FLEXERIL) 5 MG tablet  Dispense: 30 tablet; Refill: 4  - gabapentin (NEURONTIN) 300 MG capsule  Dispense: 120 capsule; Refill: 3  - naproxen (NAPROSYN) 500 MG tablet  Dispense: 60 tablet; Refill: 3    Hyperlipidemia LDL goal <130  - atorvastatin (LIPITOR) 20 MG tablet  Dispense: 90 tablet; Refill: 3      Gastroesophageal reflux disease without esophagitis  - famotidine (PEPCID) 20 MG tablet  Dispense: 120 tablet; Refill: 4  - omeprazole (PRILOSEC) 20 MG DR capsule  Dispense: 90 capsule; Refill: 0               BMI:   Estimated body mass index is 26.67 kg/m  as calculated from the following:    Height as of this encounter: 1.667 m (5' 5.63\").    Weight as of this encounter: 74.1 kg (163 lb 6.4 oz).   Weight management plan: Discussed healthy diet and exercise guidelines    Return in about 3 months (around 11/3/2023) for back pain.      Sonia Wu MD  [facility]ES[facility] Subjective Beh is a 43 year old, presenting for the following health issues:  Recheck Medication      History of Present Illness       Back Pain:  He presents for follow up of back pain. Patient's back pain is a chronic problem.  Location of back pain:  Right lower back and left lower back  Description of back pain: burning and stabbing  Back pain spreads: left buttocks, left " thigh, left knee and left foot    Since patient first noticed back pain, pain is: always present, but gets better and worse  Does back pain interfere with his job:  Not applicable       He eats 2-3 servings of fruits and vegetables daily.        Even though he is doing PT, the pain is still there. Burning/stabbing pain in bilateral low back. Pian is tolerable with medication, but not without. But the medications make him dizzy.   He has cyclobenzaprine and gabapentin. The cyclobenzaprine is only at night.   He had an appt with neurosurgery for follow up 5/30/23 but no show. If his back pain still bad enough that he thinks he cannot work. Will help him reschedule that.     Burnng pain without his medicine, but ok with the medicine.   He is sleeping ok at night with his medications.     He is hopeful that he can recover enough to go back to work but has not reached that yet.   Significant pain when he tries to lift heavy things.   He tries, but he becomes so uncomfortable.     Lumbar facet arthropathy  Lumbar radiculopathy  DDD (degenerative disc disease), lumbar  Chronic left-sided low back pain with left-sided sciatica  Ok to restart medications for back pain while he does PT. He stopped everything when they ran out a few weeks ago.      Gastroesophageal reflux disease without esophagitis  2 times per week heart burn really bad, has never been on meds for it. Will trial 1 month omeprazole with prn famotidine and follow up in 1-2 months. If no improvement consider testing hpyori     Hyperlipidemia LDL goal <130  -     atorvastatin (LIPITOR) 20 MG tablet; Take 1 tablet (20 mg) by mouth At Bedtime        we could check lipids in 3 months if he wants, but only if significant changes made     Patient has been advised of split billing requirements and indicates understanding: Yes      Review of Systems   Constitutional, HEENT, cardiovascular, pulmonary, gi and gu systems are negative, except as otherwise noted.     "  Objective    /75   Pulse 76   Temp 99.2  F (37.3  C) (Oral)   Resp 14   Ht 1.667 m (5' 5.63\")   Wt 74.1 kg (163 lb 6.4 oz)   SpO2 98%   BMI 26.67 kg/m    Body mass index is 26.67 kg/m .  Physical Exam   GENERAL: healthy, alert and no distress  NECK: no adenopathy, no asymmetry, masses, or scars and thyroid normal to palpation  RESP: lungs clear to auscultation - no rales, rhonchi or wheezes  CV: regular rate and rhythm, normal S1 S2, no S3 or S4, no murmur, click or rub, no peripheral edema and peripheral pulses strong  ABDOMEN: soft, nontender, no hepatosplenomegaly, no masses and bowel sounds normal  MS: no gross musculoskeletal defects noted, no edema    Lab on 01/26/2023   Component Date Value Ref Range Status     WBC Count 01/26/2023 8.4  4.0 - 11.0 10e3/uL Final     RBC Count 01/26/2023 5.08  4.40 - 5.90 10e6/uL Final     Hemoglobin 01/26/2023 15.1  13.3 - 17.7 g/dL Final     Hematocrit 01/26/2023 46.1  40.0 - 53.0 % Final     MCV 01/26/2023 91  78 - 100 fL Final     MCH 01/26/2023 29.7  26.5 - 33.0 pg Final     MCHC 01/26/2023 32.8  31.5 - 36.5 g/dL Final     RDW 01/26/2023 13.2  10.0 - 15.0 % Final     Platelet Count 01/26/2023 266  150 - 450 10e3/uL Final     Sodium 01/26/2023 142  136 - 145 mmol/L Final     Potassium 01/26/2023 3.8  3.4 - 5.3 mmol/L Final     Chloride 01/26/2023 105  98 - 107 mmol/L Final     Carbon Dioxide (CO2) 01/26/2023 28  22 - 29 mmol/L Final     Anion Gap 01/26/2023 9  7 - 15 mmol/L Final     Urea Nitrogen 01/26/2023 10.3  6.0 - 20.0 mg/dL Final     Creatinine 01/26/2023 0.90  0.67 - 1.17 mg/dL Final     Calcium 01/26/2023 9.8  8.6 - 10.0 mg/dL Final     Glucose 01/26/2023 78  70 - 99 mg/dL Final     GFR Estimate 01/26/2023 >90  >60 mL/min/1.73m2 Final    eGFR calculated using 2021 CKD-EPI equation.     INR 01/26/2023 0.92  0.85 - 1.15 Final     aPTT 01/26/2023 26  22 - 38 Seconds Final                     "

## 2023-08-04 NOTE — PROGRESS NOTES
08/04/23 Call attempt #1, TC called patient to help schedule neuro surgery follow up appt, no , TC used [employer]  to [facility] at [phone] to call clinic back to schedule appt. Please transfer call to [facility] TC when patient return call.

## 2023-08-18 ENCOUNTER — THERAPY VISIT (OUTPATIENT)
Dept: PHYSICAL THERAPY | Facility: REHABILITATION | Age: 43
End: 2023-08-18
Payer: COMMERCIAL

## 2023-08-18 DIAGNOSIS — M54.16 LUMBAR RADICULOPATHY: Primary | ICD-10-CM

## 2023-08-18 DIAGNOSIS — M47.816 LUMBAR FACET ARTHROPATHY: ICD-10-CM

## 2023-08-18 DIAGNOSIS — M54.42 CHRONIC LEFT-SIDED LOW BACK PAIN WITH LEFT-SIDED SCIATICA: ICD-10-CM

## 2023-08-18 DIAGNOSIS — G89.29 CHRONIC LEFT-SIDED LOW BACK PAIN WITH LEFT-SIDED SCIATICA: ICD-10-CM

## 2023-08-18 DIAGNOSIS — M51.26 LUMBAR DISC HERNIATION: ICD-10-CM

## 2023-08-18 DIAGNOSIS — M51.369 DDD (DEGENERATIVE DISC DISEASE), LUMBAR: ICD-10-CM

## 2023-08-18 PROCEDURE — 97112 NEUROMUSCULAR REEDUCATION: CPT | Mod: GP | Performed by: PHYSICAL THERAPIST

## 2023-08-18 PROCEDURE — 97140 MANUAL THERAPY 1/> REGIONS: CPT | Mod: GP | Performed by: PHYSICAL THERAPIST

## 2023-08-18 PROCEDURE — 97110 THERAPEUTIC EXERCISES: CPT | Mod: GP | Performed by: PHYSICAL THERAPIST

## 2023-09-01 ENCOUNTER — THERAPY VISIT (OUTPATIENT)
Dept: PHYSICAL THERAPY | Facility: REHABILITATION | Age: 43
End: 2023-09-01
Payer: COMMERCIAL

## 2023-09-01 DIAGNOSIS — M54.16 LUMBAR RADICULOPATHY: Primary | ICD-10-CM

## 2023-09-01 DIAGNOSIS — M51.26 LUMBAR DISC HERNIATION: ICD-10-CM

## 2023-09-01 DIAGNOSIS — M54.42 CHRONIC LEFT-SIDED LOW BACK PAIN WITH LEFT-SIDED SCIATICA: ICD-10-CM

## 2023-09-01 DIAGNOSIS — M47.816 LUMBAR FACET ARTHROPATHY: ICD-10-CM

## 2023-09-01 DIAGNOSIS — M51.369 DDD (DEGENERATIVE DISC DISEASE), LUMBAR: ICD-10-CM

## 2023-09-01 DIAGNOSIS — G89.29 CHRONIC LEFT-SIDED LOW BACK PAIN WITH LEFT-SIDED SCIATICA: ICD-10-CM

## 2023-09-01 PROCEDURE — 97110 THERAPEUTIC EXERCISES: CPT | Mod: GP | Performed by: PHYSICAL THERAPIST

## 2023-09-01 NOTE — PROGRESS NOTES
"   09/01/23 0500   Appointment Info   Signing clinician's name / credentials Bina Shelton DPT, PT   Total/Authorized Visits 15   Visits Used 10   Medical Diagnosis M54.16 (ICD-10-CM) - Lumbar radiculopathy  M51.26 (ICD-10-CM) - Lumbar disc herniation   PT Tx Diagnosis lumbar spine pain with referral into LE, consistent with medical diagnosis, musimbalnce   Progress Note/Certification   Start of Care Date 04/27/23   Onset of illness/injury or Date of Surgery 03/30/23   Therapy Frequency 1 x every 1-2 weeeks   Predicted Duration 8-10 weeks   Certification date from 06/23/23   Certification date to 10/01/23   Progress Note Due Date 10/01/23       Present Yes   Preferred Language DANNI    ID [id number]   PT Goal 1   Goal Identifier walking   Goal Description Patient will be able to ambulate x 30 minutes without sx increase 3/10 or greater.   Rationale to maximize safety and independence within the community  (and work)   Goal Progress 10-15 minutes of walking then needs to rest   Target Date 10/01/23   PT Goal 2   Goal Identifier sitting/standing   Goal Description Patient will be able to sit/stand 45-60 min without sx 3/10 or greater.   Rationale to maximize safety and independence within the community  (and return to work)   Goal Progress he can sit/stand for 30 min, then feels his \"whole body\" has increased pain/pressure/achy   Target Date 10/01/23   PT Goal 3   Goal Identifier carrying/lifting   Goal Description Patient will be able to carry/lift 10# without sx 3/10 or greater.   Rationale to maximize safety and independence within the community  (and work)   Goal Progress MET 10-15# without sx increase, does experience some pain when lifting 30#   Target Date 10/01/23   PT Goal 4   Goal Identifier HEP   Goal Description Patient will be able to demonstrate 6 exercises without assist for progression to independent management.   Rationale to maximize safety and independence within the " community  (home and work)   Goal Progress doing daily, helps with pain, Met- will continue to progress if needed   Target Date 06/22/23   Date Met 07/07/23   Subjective Report   Subjective Report Patient reports feeling very little pain. The pain that he experiences is tolerable. Is ready for today to be his last session of PT will continue to do exercises and focus on proper liftiing and come back if needed.   Objective Measures   Objective Measures Objective Measure 1;Objective Measure 2;Objective Measure 3   Objective Measure 1   Objective Measure Slump: R negative, L mild positive   Details standing ext: 10 reps, no change in symptoms   Objective Measure 2   Objective Measure lumbar AROM   Details flexion:to toes Ext: WNL,  Rotation: R WNL, L WNL   Objective Measure 3   Objective Measure Supine: symmetrical ASIS   Treatment Interventions (PT)   Interventions Therapeutic Procedure/Exercise;Neuromuscular Re-education;Manual Therapy   Therapeutic Procedure/Exercise   Therapeutic Procedures: strength, endurance, ROM, flexibillity minutes ([id number]) 45   Ther Proc 1 NU step   Ther Proc 1 - Details level 7 x 6 min   Ther Proc 2 standing step out with resistance for lumbar paraspinals   Ther Proc 2 - Details level 3 band x 10 reps (2 steps out & back)   Ther Proc 3 seated rotation with band   Ther Proc 3 - Details level 1 band x 10 each direction   PTRx Ther Proc 1 Supine Abdominal Exercise #9B (Alternate Arm and Leg Extension With Feet Off the Floor)   PTRx Ther Proc 1 - Details 2 x 8 reps B required cue regarding proper position of lumbar spine   PTRx Ther Proc 2 Bridging #4 Bent leg   PTRx Ther Proc 2 - Details 1  set required verbal cue for use of abdominal muscles   PTRx Ther Proc 3 Bridging #1   PTRx Ther Proc 3 - Details do march or with isometric hip adduction go through a smaller ROM to reduce pain 2 x 10 reps   PTRx Ther Proc 4 Supine Abdominal Exercise #8 (Toe Taps)   PTRx Ther Proc 4 - Details progressed to  dead bug   PTRx Ther Proc 5 Hip Flexion Straight Leg Raise   PTRx Ther Proc 5 - Details 8 x each LE   PTRx Ther Proc 6 Clamshell with Theraband   PTRx Ther Proc 6 - Details with L3 band- x 10 each side   PTRx Ther Proc 7 Supine Lumbar Hip Roll   PTRx Ther Proc 7 - Details verbal review   PTRx Ther Proc 8 Supine Hamstring Stretch   PTRx Ther Proc 8 - Details verbal review   PTRx Ther Proc 9 Piriformis Stretch Below 90 Degrees Supine   PTRx Ther Proc 9 - Details verbal review   PTRx Ther Proc 10 Clamshell Feet together   PTRx Ther Proc 10 - Details No Notes   PTRx Ther Proc 11 Sidestep   PTRx Ther Proc 11 - Details lateral steps with band for lumbar paraspinals level 3 band x 10 steps out and back progressed to blue band x 10 each direction   PTRx Ther Proc 12 Quadratus Lumborum Stretch   PTRx Ther Proc 12 - Details verbal review   PTRx Ther Proc 13 Supine MET For Anterior Posterior Innominate Rotation   PTRx Ther Proc 13 - Details not completed at todays visit   Skilled Intervention Lumbar, hip strengthening and stretching to reduce pain and improve function with bending, lifting to reduce risk of re-injury   Patient Response/Progress no change in symptoms, muscle fatigue noted with dead bug exercise   Therapeutic Activity   Ther Act 1 crate lifts 10# from mat to chest   Ther Act 1 - Details x 5 reps, repeated with 15# increased L LE symptoms   PTRx Ther Act 1 Body Mechanics - Full Squat   PTRx Ther Act 1 - Details Not reviewed today   PTRx Ther Act 2 Body Mechanics - Half Kneel Lift   PTRx Ther Act 2 - Details Not reviewed today   PTRx Ther Act 3 Body Mechanics - Overhead Lift   PTRx Ther Act 3 - Details Not reviewed today   PTRx Ther Act 4 Body Mechanics - Golfers Lift   PTRx Ther Act 4 - Details Not reviewed today   PTRx Ther Act 5 Body Mechanics - Waiters Dawn   PTRx Ther Act 5 - Details Not reviewed today   PTRx Ther Act 6 Education Sheet General   PTRx Ther Act 6 - Details seated rotation for back- close band in  door chest height wrap around shoulders turn into band green band level 3 progress to blue level 4  x 15 each direction   Neuromuscular Re-education   Neuromuscular Re-education Neuro Re-ed 2   Neuro Re-ed 1 PNE   Neuro Re-ed 1 - Details PNE education with regards to space, blood flow and movement required for a healthy nervous system   Neuro Re-ed 2 Neurodynamic exercises   Neuro Re-ed 2 - Details slump slider on L x 10 reps, work up to 3-5 times per day as tolerated, education on gentle pull/stretch felt - should not significantly increase pain   PTRx Neuro Re-ed 1 Abdominal Brace Transverse Abdominis   PTRx Neuro Re-ed 1 - Details progressed so can discontinue   Skilled Intervention PNE and neurodynamic exercises to improve nerve mobility and decrease pain   Manual Therapy   Manual Therapy 1 STM   Manual Therapy 1 - Details R S/L STM/MFR to L piriformis with education on how to complete at home with use of tennis ball   Skilled Intervention STM/MFR to improve mobility and reduce pain   Patient Response/Progress pt reports 4/10 pain at end of session   Education   Learner/Method Patient   Plan   Homework LTR, TrA brace, hamstring 90/90 + ankle pumps for nerve glides, bridge, piriformis stretch,   Home program PTRx   Plan for next session assess response to manual therapy   Comments   Comments Pt appropriate for continued skilled PT intervention. Pt responded well to manual therapy completed today and progressed with nerve glides.   Total Session Time   Timed Code Treatment Minutes 45   Total Treatment Time (sum of timed and untimed services) 45   Medicare Claim Information   Medical Diagnosis M54.16 (ICD-10-CM) - Lumbar radiculopathy  M51.26 (ICD-10-CM) - Lumbar disc herniation   PT Diagnosis lumbar spine pain with referral into LE, consistent with medical diagnosis, musimbalnce,   Start of Care Date 04/27/23   Onset date of current episode/exacerbation 03/30/23   Certification date from 04/27/23   Ortho Goal 1    Goal Identifier walking   Goal Description Patient will be able to ambulate x 30 minutes without sx increase 3/10 or greater.   Goal Progress in progress   Target Date 06/22/23   Ortho Goal 2   Goal Identifier sitting/standing   Goal Description Patient will be able to sit/stand 45-60 min without sx 3/10 or greater.   Goal Progress in progress   Target Date 06/22/23   Ortho Goal 3   Goal Identifier carrying/lifting   Goal Description Patient will be able to carry/lift 10# without sx 3/10 or greater.   Goal Progress in progress   Target Date 06/22/23   Ortho Goal 4   Goal Identifier HEP   Goal Description Patient will be able to demonstrate 6 exercises without assist for progression to independent management.   Goal Progress in progress   Target Date 06/22/23       DISCHARGE  Reason for Discharge: Patient has met all goals.  Nearly MET all goals.        Discharge Plan: Patient to continue home program.    Referring Provider:  Cady Garcia

## 2023-09-14 ENCOUNTER — OFFICE VISIT (OUTPATIENT)
Dept: NEUROSURGERY | Facility: CLINIC | Age: 43
End: 2023-09-14
Payer: COMMERCIAL

## 2023-09-14 VITALS — SYSTOLIC BLOOD PRESSURE: 116 MMHG | HEART RATE: 87 BPM | DIASTOLIC BLOOD PRESSURE: 71 MMHG | OXYGEN SATURATION: 97 %

## 2023-09-14 DIAGNOSIS — M54.16 LUMBAR RADICULOPATHY: Primary | ICD-10-CM

## 2023-09-14 PROCEDURE — 99213 OFFICE O/P EST LOW 20 MIN: CPT | Performed by: PHYSICIAN ASSISTANT

## 2023-09-14 RX ORDER — METHYLPREDNISOLONE 4 MG
TABLET, DOSE PACK ORAL
Qty: 21 TABLET | Refills: 0 | Status: SHIPPED | OUTPATIENT
Start: 2023-09-14 | End: 2024-01-24 | Stop reason: SINTOL

## 2023-09-14 ASSESSMENT — PAIN SCALES - GENERAL: PAINLEVEL: SEVERE PAIN (7)

## 2023-09-14 NOTE — PATIENT INSTRUCTIONS
Patient has been advised to complete lumbar MRI with and without contrast and will plan for follow up once completed. May benefit from referral to spine clinic for further conservative treatment to include possible injections. Will provide medrol dospak for possible relief. Will plan to follow up once MRI is completed for further recommendations and treatment options.

## 2023-09-14 NOTE — PROGRESS NOTES
Neurosurgery Progress Note: 9/14/2023     A/P: status post Left lumbar 4-lumbar 5 hemilaminectomy, medial /facetectomy, foraminotomy, and microdiscectomy on 02/17/2023 with Dr. Zuniga. On 2/17/2023      Plan: Patient has been advised to complete lumbar MRI with and without contrast and will plan for follow up once completed. May benefit from referral to spine clinic for further conservative treatment to include possible injections. Will provide medrol dospak for possible relief. Will plan to follow up once MRI is completed for further recommendations and treatment options.       Mr. Piedra is a 42 year old female who presents status post Left lumbar 4-lumbar 5 hemilaminectomy, medial /facetectomy, foraminotomy, and microdiscectomy on 02/17/2023 with Dr. Zuniga. On 2/17/2023. He was last seen 4/2023 and recommended PT with follow up in 8 weeks if symptoms persisted. Presently he notes continued severe left radicular leg pain that radiates in a similar distribution as prior to surgery. He states that following surgery it improved slightly but never completely resolved. He denies any radicular right lower extremity pain. He denies any numbness or tingling. He denies changes in bowel or bladder habits     Exam:  /71   Pulse 87   SpO2 97%     General: alert and oriented x3     Strength slight give way weakness with left hip flexors and knee extension secondary to pain    Sensation is intact throughout both upper and lower extremities    Gait is smooth and coordinated    Incision: well healed without erythema, induration, or drainage        Olivia Brito PA-C  [facility]  O: [phone]

## 2023-09-14 NOTE — NURSING NOTE
"Beh G Dwe is a 43 year old male who presents for:  Chief Complaint   Patient presents with    Surgical Followup     Difficulty sitting and walking for long periods of time  Low back pain  Left leg pain  Physical therapy - consistent sessions - no relief from pain          Initial Vitals:  /71   Pulse 87   SpO2 97%  Estimated body mass index is 26.67 kg/m  as calculated from the following:    Height as of 8/3/23: 5' 5.63\" (1.667 m).    Weight as of 8/3/23: 163 lb 6.4 oz (74.1 kg).. There is no height or weight on file to calculate BSA. BP completed using cuff size: regular  Severe Pain (7)    Dhruv Doss  "

## 2023-09-14 NOTE — LETTER
9/14/2023         RE: Beh G Dwe  [address]  [place] [address]        Dear Colleague,    Thank you for referring your patient, Beh G Dwe, to the [facility]ERY. Please see a copy of my visit note below.    Neurosurgery Progress Note: 9/14/2023     A/P: status post Left lumbar 4-lumbar 5 hemilaminectomy, medial /facetectomy, foraminotomy, and microdiscectomy on 02/17/2023 with Dr. Zuniga. On 2/17/2023      Plan: Patient has been advised to complete lumbar MRI with and without contrast and will plan for follow up once completed. May benefit from referral to spine clinic for further conservative treatment to include possible injections. Will provide medrol dospak for possible relief. Will plan to follow up once MRI is completed for further recommendations and treatment options.       Mr. Piedra is a 42 year old female who presents status post Left lumbar 4-lumbar 5 hemilaminectomy, medial /facetectomy, foraminotomy, and microdiscectomy on 02/17/2023 with Dr. Zuniga. On 2/17/2023. He was last seen 4/2023 and recommended PT with follow up in 8 weeks if symptoms persisted. Presently he notes continued severe left radicular leg pain that radiates in a similar distribution as prior to surgery. He states that following surgery it improved slightly but never completely resolved. He denies any radicular right lower extremity pain. He denies any numbness or tingling. He denies changes in bowel or bladder habits     Exam:  /71   Pulse 87   SpO2 97%     General: alert and oriented x3     Strength slight give way weakness with left hip flexors and knee extension secondary to pain    Sensation is intact throughout both upper and lower extremities    Gait is smooth and coordinated    Incision: well healed without erythema, induration, or drainage        Olivia Brito PA-C  [facility]surgery  O: [phone]       Again, thank you for allowing me to participate in the care of  your patient.        Sincerely,        Olivia Brito PA-C

## 2023-10-01 ENCOUNTER — HOSPITAL ENCOUNTER (OUTPATIENT)
Dept: MRI IMAGING | Facility: HOSPITAL | Age: 43
Discharge: HOME | End: 2023-10-01
Attending: PHYSICIAN ASSISTANT | Admitting: PHYSICIAN ASSISTANT
Payer: COMMERCIAL

## 2023-10-01 DIAGNOSIS — M54.16 LUMBAR RADICULOPATHY: ICD-10-CM

## 2023-10-01 PROCEDURE — 72148 MRI LUMBAR SPINE W/O DYE: CPT

## 2023-10-03 ENCOUNTER — OFFICE VISIT (OUTPATIENT)
Dept: NEUROSURGERY | Facility: CLINIC | Age: 43
End: 2023-10-03
Attending: PHYSICIAN ASSISTANT
Payer: COMMERCIAL

## 2023-10-03 VITALS — OXYGEN SATURATION: 96 % | SYSTOLIC BLOOD PRESSURE: 121 MMHG | HEART RATE: 88 BPM | DIASTOLIC BLOOD PRESSURE: 79 MMHG

## 2023-10-03 DIAGNOSIS — M54.16 LUMBAR RADICULOPATHY: Primary | ICD-10-CM

## 2023-10-03 PROCEDURE — 99213 OFFICE O/P EST LOW 20 MIN: CPT | Performed by: PHYSICIAN ASSISTANT

## 2023-10-03 ASSESSMENT — PAIN SCALES - GENERAL: PAINLEVEL: SEVERE PAIN (7)

## 2023-10-03 NOTE — PATIENT INSTRUCTIONS
Patient discussed with Dr. Zuniga and recommendations to attempt conservative treatment with physical therapy and referral to spine clinic for further treatment options. Follow up will otherwise be on an as needed basis and he understands to contact the office should any symptoms worsen or arise.

## 2023-10-03 NOTE — PROGRESS NOTES
Neurosurgery Progress Note: 9/14/2023     A/P: status post Left lumbar 4-lumbar 5 hemilaminectomy, medial /facetectomy, foraminotomy, and microdiscectomy on 02/17/2023 with Dr. Zuniga. On 2/17/2023      Plan: Patient discussed with Dr. Zuniga and recommendations to attempt conservative treatment with physical therapy and referral to spine clinic for further treatment options. Follow up will otherwise be on an as needed basis and he understands to contact the office should any symptoms worsen or arise.       Mr. Piedra is a 42 year old female who presents status post Left lumbar 4-lumbar 5 hemilaminectomy, medial /facetectomy, foraminotomy, and microdiscectomy on 02/17/2023 with Dr. Zuniga. On 2/17/2023. He notes continued continued severe left radicular leg pain that radiates in a similar distribution as prior to surgery.  Begins in his back and radiates down the posterior aspect of his leg to his foot.  He notes on occasion his left leg will be numb in a similar area as his pain. He felt slight relief with the medrol Dosepak and feels that they the pain remains unchanged he is able to ambulate and move his LLE better. He denies changes in bowel or bladder habits       Exam:  /79   Pulse 88   SpO2 96%       General: alert and oriented x3     Strength 5/5 strength throughout     Sensation is intact throughout both upper and lower extremities    Gait is smooth and coordinated    Incision: well healed without erythema, induration, or drainage      Images reviewed personally   IMPRESSION:  1. Lower lumbar spondylosis, not significantly progressed since 12/10/2022.  2. New postsurgical changes of left hemilaminectomy and presumed microdiscectomy at L4-L5. Resolution of previously seen inferiorly migrated left subarticular disc extrusion at L4-L5 and wide surgical decompression spinal canal and left lateral recess.  3. Moderate to severe right neural foraminal stenosis at L5-S1 with small right foraminal disc  herniation displacing and possibly impinging the right L5 dorsal ganglion.  4. Moderate bilateral neural foraminal stenosis at L4-L5. Mild neural foraminal stenosis bilaterally at L3-L4 and on the left at L5-S1.  5. Mild spinal canal stenosis at L3-L4.  6. Mixed Modic type I and type II degenerative endplate signal changes about the disc spaces at L4-L5 and L5-S1.        Olivia Brito PA-C  [facility]  O: [phone]

## 2023-10-03 NOTE — LETTER
10/3/2023         RE: Beh G Dwe  [address]  [place] [address]        Dear Colleague,    Thank you for referring your patient, Beh G Dwe, to the [facility]INE [facility] NEUROSURGERY. Please see a copy of my visit note below.    Neurosurgery Progress Note: 9/14/2023     A/P: status post Left lumbar 4-lumbar 5 hemilaminectomy, medial /facetectomy, foraminotomy, and microdiscectomy on 02/17/2023 with Dr. Zuniga. On 2/17/2023      Plan: Patient discussed with Dr. Zuniga and recommendations to attempt conservative treatment with physical therapy and referral to spine clinic for further treatment options. Follow up will otherwise be on an as needed basis and he understands to contact the office should any symptoms worsen or arise.       Mr. Piedra is a 42 year old female who presents status post Left lumbar 4-lumbar 5 hemilaminectomy, medial /facetectomy, foraminotomy, and microdiscectomy on 02/17/2023 with Dr. Zuniga. On 2/17/2023. He notes continued continued severe left radicular leg pain that radiates in a similar distribution as prior to surgery.  Begins in his back and radiates down the posterior aspect of his leg to his foot.  He notes on occasion his left leg will be numb in a similar area as his pain. He felt slight relief with the medrol Dosepak and feels that they the pain remains unchanged he is able to ambulate and move his LLE better. He denies changes in bowel or bladder habits       Exam:  /79   Pulse 88   SpO2 96%       General: alert and oriented x3     Strength 5/5 strength throughout     Sensation is intact throughout both upper and lower extremities    Gait is smooth and coordinated    Incision: well healed without erythema, induration, or drainage      Images reviewed personally   IMPRESSION:  1. Lower lumbar spondylosis, not significantly progressed since 12/10/2022.  2. New postsurgical changes of left hemilaminectomy and presumed microdiscectomy at L4-L5. Resolution of  previously seen inferiorly migrated left subarticular disc extrusion at L4-L5 and wide surgical decompression spinal canal and left lateral recess.  3. Moderate to severe right neural foraminal stenosis at L5-S1 with small right foraminal disc herniation displacing and possibly impinging the right L5 dorsal ganglion.  4. Moderate bilateral neural foraminal stenosis at L4-L5. Mild neural foraminal stenosis bilaterally at L3-L4 and on the left at L5-S1.  5. Mild spinal canal stenosis at L3-L4.  6. Mixed Modic type I and type II degenerative endplate signal changes about the disc spaces at L4-L5 and L5-S1.        Olivia Brito PA-C  [facility]surgery  O: [phone]       Again, thank you for allowing me to participate in the care of your patient.        Sincerely,        Olivia Brito PA-C

## 2023-10-03 NOTE — NURSING NOTE
"Beh G Dwe is a 43 year old male who presents for:  Chief Complaint   Patient presents with    RECHECK        Initial Vitals:  /79   Pulse 88   SpO2 96%  Estimated body mass index is 26.67 kg/m  as calculated from the following:    Height as of 8/3/23: 5' 5.63\" (1.667 m).    Weight as of 8/3/23: 163 lb 6.4 oz (74.1 kg).. There is no height or weight on file to calculate BSA. BP completed using cuff size: regular  Severe Pain (7)    Phil Mott    "

## 2023-10-05 ENCOUNTER — THERAPY VISIT (OUTPATIENT)
Dept: PHYSICAL THERAPY | Facility: REHABILITATION | Age: 43
End: 2023-10-05
Attending: PHYSICIAN ASSISTANT
Payer: COMMERCIAL

## 2023-10-05 DIAGNOSIS — M54.16 LUMBAR RADICULOPATHY: ICD-10-CM

## 2023-10-05 PROCEDURE — 97161 PT EVAL LOW COMPLEX 20 MIN: CPT | Mod: GP | Performed by: PHYSICAL THERAPIST

## 2023-10-05 PROCEDURE — 97110 THERAPEUTIC EXERCISES: CPT | Mod: GP | Performed by: PHYSICAL THERAPIST

## 2023-10-05 NOTE — PROGRESS NOTES
PHYSICAL THERAPY EVALUATION  Type of Visit: Evaluation    See electronic medical record for Abuse and Falls Screening details.    Subjective       Presenting condition or subjective complaint:    Date of onset: 10/03/23    Relevant medical history:     Dates & types of surgery:  status post Left lumbar 4-lumbar 5 hemilaminectomy, medial /facetectomy, foraminotomy, and microdiscectomy on 02/17/2023 with Dr. Zuniga. On 2/17/2023     Prior diagnostic imaging/testing results:       Prior therapy history for the same diagnosis, illness or injury:    Yes    Patient reports having back pain, and had surgery (noted above) without relief. Having back pain along left side and radiating down left leg. Reports having this pain for 10 years, without injury or trauma, was on/off and was tolerable, but got worse around December 2022 due to lifting heavy loads. Was beginning to have pain with coughing, sneezing, and symptoms were intolerable which led to surgery. Surgery provided a little relief compared to symptoms just before surgery. Currently cannot lift more than 15lbs otherwise pain will get really bad. Currently limited to 20 minutes of standing activities before needing to sit/lay down due to pain.       Prior Level of Function  Patient able to tolerate sitting for 20-30 minutes with lifting at least 15 lbs without being limited due to pain    Living Environment    Employment:    Driving, transporting elderly populations, Not currently working, but reports would like to return to this work in the future when symptoms are better.     Hobbies/Interests:  doing house chores, looking after his kids. reading    Patient goals for therapy:  return to work, return to activities with family    Pain assessment:  7/10     Objective   LUMBAR SPINE EVALUATION  PAIN: Pain Location: lumbar spine and left leg  Pain Quality: Aching and numbness in left leg  Pain Frequency: constant  Pain is Worst: daytime  Pain is Exacerbated By: walking  prolonged durations, sitting prolonged durations  Pain is Relieved By: changing positions  INTEGUMENTARY (edema, incisions):   POSTURE:   GAIT:   Weightbearing Status:   Assistive Device(s):   Gait Deviations:   BALANCE/PROPRIOCEPTION:   WEIGHTBEARING ALIGNMENT:   NON-WEIGHTBEARING ALIGNMENT:    ROM:   (Degrees) Left AROM Left PROM  Right AROM Right PROM   Knee Extension  WNL  WNL   Lumbar Side glide     Lumbar Flexion Limited by 25%, with low back pain, gowers sign upon return to standing. Deferred repeated motion testing due to time   Lumbar Extension Limited by 25% with low back pain. Deferred repeated motion testing due to time   Pain:   End feel:   PELVIC/SI SCREEN:   STRENGTH:     MYOTOMES:    Left Right   T12-L3 (Hip Flexion)     L2-4 (Quads)      L4 (Ankle DF) 5 5   L5 (Great Toe Ext)     S1 (Toe Raise) 5 5     DTR S:    Left Right   C5 (Biceps)     C6 (Brachioradialis)     C7 (Triceps)     L4 (Quad) 2 2   S1 (Achilles) 2 2     CORD SIGNS: Babinski negative L, Babinski negative R  DERMATOMES:   NEURAL TENSION:  negative SLR, left  FLEXIBILITY:   LUMBAR/HIP Special Tests:    PELVIS/SI SPECIAL TESTS:   FUNCTIONAL TESTS:  Patient using arm rests on chair to perform sit to stand  PALPATION:   SPINAL SEGMENTAL CONCLUSIONS:       Assessment & Plan   CLINICAL IMPRESSIONS  Medical Diagnosis: Olivia Brito PA-C    Treatment Diagnosis: weakness of lumbo-pelvic-hip musculature   Impression/Assessment: Patient is a 43 year old male with chief complaints of persistent low back and left lower extremity pain and paresthesias.  The following significant findings have been identified: Pain, Decreased ROM/flexibility, Decreased strength, Impaired muscle performance, and Decreased activity tolerance. These impairments interfere with their ability to perform work tasks, recreational activities, household chores, and driving  as compared to previous level of function. Patient will benefit from skilled physical therapy to  address impairments and functional limitations in order to achieve their goals.       Clinical Decision Making (Complexity):  Clinical Presentation: Stable/Uncomplicated  Clinical Presentation Rationale: based on medical and personal factors listed in PT evaluation  Clinical Decision Making (Complexity): Low complexity    PLAN OF CARE  Treatment Interventions:  Interventions: Manual Therapy, Neuromuscular Re-education, Therapeutic Activity, Therapeutic Exercise, Self-Care/Home Management    Long Term Goals     PT Goal 1  Goal Identifier: HEP  Goal Description: Patient will demonstrate >50% compliance with home exercise program to promote independence and progress towards  PT Goal 2  Goal Identifier: Patient will be able to lift and carry at least 20lbs for greater than 1 minute without being limited due to pain  Target Date: 12/28/23  PT Goal 3  Goal Identifier: Patient will perform sit to stand at least 10 repetitions without use of upper extremities to improve functional trunk and lower extremity strength  Target Date: 12/28/23      Frequency of Treatment: weekly  Duration of Treatment: 12 weeks    Recommended Referrals to Other Professionals:   Education Assessment:   Learner/Method: Patient    Risks and benefits of evaluation/treatment have been explained.   Patient/Family/caregiver agrees with Plan of Care.     Evaluation Time:     PT Eval, Low Complexity Minutes (77494): 40       Signing Clinician: Quintin Contreras, PT      [facility]                                                                                   OUTPATIENT PHYSICAL THERAPY      PLAN OF TREATMENT FOR OUTPATIENT REHABILITATION   Patient's Last Name, First Name, M.I. Dwe,Beh G YOB: 1980   Provider's Name   [facility]   Medical Record No.  [id number]     Onset Date: 10/03/23  Start of Care Date: 10/05/23     Medical Diagnosis:  Olivia Brito PA-C      PT Treatment  Diagnosis:  weakness of lumbo-pelvic-hip musculature Plan of Treatment  Frequency/Duration: weekly/ 12 weeks    Certification date from 10/05/23 to 12/28/23         See note for plan of treatment details and functional goals     Quintin Contreras, PT                         I CERTIFY THE NEED FOR THESE SERVICES FURNISHED UNDER        THIS PLAN OF TREATMENT AND WHILE UNDER MY CARE     (Physician attestation of this document indicates review and certification of the therapy plan).                Referring Provider:  Olivia Brito      Initial Assessment  See Epic Evaluation- Start of Care Date: 10/05/23

## 2023-10-13 NOTE — PROGRESS NOTES
Assessment:   Beh G Dwe is a 43 y.o. male with past medical history significant for nicotine dependence who presents today for follow-up regarding chronic low back pain with intermittent radiation into the left lower extremity.  Patient had left L4-5 microdiscectomy February 17, 2023 with Dr. Zuniga.  Patient reports about 50% improvement in pain following surgery, but he continues to have aching back pain which is bothersome and interferes with his ability to lift.  My review of an MRI lumbar spine shows resolution of the previously seen left paracentral disc extrusion at L4-5.  There is moderate to severe right foraminal stenosis L5-S1 and moderate bilateral foraminal stenosis L4-5.  Suspect that he is symptomatic from lumbar facet arthropathy.  He has reproduction pain with lumbar facet loading maneuvers.  - On exam he reported a sensory deficit left L5 dermatome.  Strength and reflexes intact.  - Patient last saw neurosurgery October 3, 2023.  They recommended physical therapy and conservative treatment.     Plan:     A shared decision making plan was used.  The patient's values and choices were respected.  The following represents what was discussed and decided upon by the physician assistant and the patient.  A telephone  is present for the visit.    1.  DIAGNOSTIC TESTS:  - I reviewed the MRI lumbar spine.  No further diagnostic tests were ordered.    2.  PHYSICAL THERAPY: Patient is currently in physical therapy.  Encourage patient to continue with physical therapy and the home exercises.    3.  MEDICATIONS:  -I prescribed duloxetine 30 mg daily.  We could titrate his dose higher, depending on response.  - Patient has failed to have any relief of his pain with Tylenol, naproxen, hydrocodone, oxycodone, tramadol, nabumetone, prednisone.  No additional opiates prescribed today.    4.  INTERVENTIONS: No interventions were ordered today.  Patient will trial duloxetine first.  He is reluctant to  consider injections because he is afraid of needles.  If this does not provide adequate relief of pain I would recommend medial branch blocks versus facet joint injections targeting the L4-5 facet joints.  - This patient would benefit from preprocedure sedation if possible.    5.  PATIENT EDUCATION: Patient is in agreement the above plan.  All questions were answered.    6.  FOLLOW-UP: Patient is going to follow-up with me in 4 weeks to see how he is doing with the Cymbalta.  If he has questions or concerns in the meantime, he should not hesitate to call.  Subjective:     Beh G Dwe is a 43 y.o. male who presents today for follow-up regarding left greater than right low back pain with intermittent radiation into the left lower extremity..  Patient had a left L4-5 microdiscectomy February 17, 2023 with Dr. Zuniga.  Patient reports 50% improvement in his pain overall since surgery.  He states that he is no longer having the sharp severe pain down the leg.  He describes now more of an aching pain that is worse in his back.  The pain no longer wakes him at night.  However, his pain continues to interfere with activity, especially lifting.  He returns to discuss treatment options.      Patient complains of left greater than right low back pain.  Pain is 60 to 70% left-sided and 30 to 40% right-sided.  He has intermittent pain radiating down the leg.  He states that his back pain is 90% of his pain and his leg pain is 10% of his pain.  The back pain is constant and never goes away.  He is able to manage the leg pain with exercise.  When he experiences the leg pain that radiates into the buttock, down the lateral thigh, into the lateral calf.  He has some numbness and tingling in the same distribution as his pain.  He feels a weak sensation in the left leg.  He rates his pain today as an 8 of 10.  At its worst it is an 8 of 10.  Pain is aggravated with transitioning from seated to standing, lifting, walking more than 10 to  20 minutes, sitting more than 10 to 20 minutes, tends to be worse when he first wakes up and gets out of bed in the morning.  Pain is alleviated with exercise.  He denies any new symptoms since he was last seen.  Denies loss of bowel or bladder control.  Denies fevers.    Treatment to date:  -Left L4-5 microdiscectomy 5 or 17, 2023  - Currently in physical therapy  - Also did physical therapy April through August 2023  - Also did physical therapy January to February 2020  - Oxycodone not helpful  - Hydrocodone not helpful  - Naproxen not helpful  - Tylenol not helpful  - Nabumetone not helpful  - Tramadol not helpful  - Gabapentin caused dizziness  - Medrol Dosepak      Review of Systems:  Positive for numbness/tingling, weakness, pain much worse at night.  Negative for loss of bowel/bladder control, inability to urinate, headache, trip/stumble/falls, difficulty swallowing, difficulty with hand skills, fevers, unintentional weight loss.     Objective:   CONSTITUTIONAL:  Vital signs as above.  Patient is in no acute distress.  The patient is well nourished and well groomed.    PSYCHIATRIC:  The patient is awake, alert, oriented to person, place and time.  The patient is answering questions appropriately with clear speech.  Normal affect.  HEENT: Normocephalic, atraumatic.  Sclera clear.    SKIN:  Skin over the face, posterior torso, bilateral upper and lower extremities is clean, dry, intact without rashes.  VASCULAR: No significant lower extremity edema.  MUSCULOSKELETAL:  Gait is nonantalgic.  Able to ambulate on toes and heels bilaterally without difficulty.  No significant tenderness palpation bilateral lower lumbar paraspinous muscles.  Lumbar flexion mildly restricted.  Lumbar extension mildly restricted.  Lumbar facet loading maneuvers reproduce low back pain bilaterally.  5/5 strength bilateral hip flexors, knee flexors/extensors, bilateral ankle dorsiflexors, bilateral ankle plantar flexors,  L  NEUROLOGICAL: 2+ patellar and Achilles reflexes which are symmetric bilaterally.  Diminished sensation left L5 dermatome.      RESULTS:   I reviewed the MRI lumbar spine from [facility] dated October 1, 2023.  This shows resolution of the previous left L4-5 disc extrusion.  At L5-S1 there is moderate to severe right foraminal stenosis.  There is also moderate bilateral foraminal stenosis L4-5.

## 2023-10-16 ENCOUNTER — OFFICE VISIT (OUTPATIENT)
Dept: PHYSICAL MEDICINE AND REHAB | Facility: CLINIC | Age: 43
End: 2023-10-16
Attending: PHYSICIAN ASSISTANT
Payer: COMMERCIAL

## 2023-10-16 VITALS
OXYGEN SATURATION: 98 % | HEART RATE: 87 BPM | WEIGHT: 158 LBS | BODY MASS INDEX: 24.8 KG/M2 | HEIGHT: 67 IN | SYSTOLIC BLOOD PRESSURE: 129 MMHG | DIASTOLIC BLOOD PRESSURE: 79 MMHG

## 2023-10-16 DIAGNOSIS — M47.816 LUMBAR FACET ARTHROPATHY: ICD-10-CM

## 2023-10-16 DIAGNOSIS — M54.16 LUMBAR RADICULAR PAIN: ICD-10-CM

## 2023-10-16 DIAGNOSIS — Z98.890 HISTORY OF LUMBAR SURGERY: Primary | ICD-10-CM

## 2023-10-16 PROCEDURE — 99214 OFFICE O/P EST MOD 30 MIN: CPT | Performed by: PHYSICIAN ASSISTANT

## 2023-10-16 RX ORDER — DULOXETIN HYDROCHLORIDE 30 MG/1
30 CAPSULE, DELAYED RELEASE ORAL AT BEDTIME
Qty: 30 CAPSULE | Refills: 0 | Status: SHIPPED | OUTPATIENT
Start: 2023-10-16 | End: 2023-12-28 | Stop reason: SINTOL

## 2023-10-16 ASSESSMENT — PAIN SCALES - GENERAL: PAINLEVEL: EXTREME PAIN (8)

## 2023-10-16 NOTE — LETTER
10/16/2023         RE: Beh G Dwe  [address]  [place] [address]        Dear Colleague,    Thank you for referring your patient, Beh G Dwe, to the [facility]. Please see a copy of my visit note below.    Assessment:   Beh G Dwe is a 43 y.o. male with past medical history significant for nicotine dependence who presents today for follow-up regarding chronic low back pain with intermittent radiation into the left lower extremity.  Patient had left L4-5 microdiscectomy February 17, 2023 with Dr. Zuniga.  Patient reports about 50% improvement in pain following surgery, but he continues to have aching back pain which is bothersome and interferes with his ability to lift.  My review of an MRI lumbar spine shows resolution of the previously seen left paracentral disc extrusion at L4-5.  There is moderate to severe right foraminal stenosis L5-S1 and moderate bilateral foraminal stenosis L4-5.  Suspect that he is symptomatic from lumbar facet arthropathy.  He has reproduction pain with lumbar facet loading maneuvers.  - On exam he reported a sensory deficit left L5 dermatome.  Strength and reflexes intact.  - Patient last saw neurosurgery October 3, 2023.  They recommended physical therapy and conservative treatment.     Plan:     A shared decision making plan was used.  The patient's values and choices were respected.  The following represents what was discussed and decided upon by the physician assistant and the patient.  A telephone  is present for the visit.    1.  DIAGNOSTIC TESTS:  - I reviewed the MRI lumbar spine.  No further diagnostic tests were ordered.    2.  PHYSICAL THERAPY: Patient is currently in physical therapy.  Encourage patient to continue with physical therapy and the home exercises.    3.  MEDICATIONS:  -I prescribed duloxetine 30 mg daily.  We could titrate his dose higher, depending on response.  - Patient has failed to have any relief of his pain  with Tylenol, naproxen, hydrocodone, oxycodone, tramadol, nabumetone, prednisone.  No additional opiates prescribed today.    4.  INTERVENTIONS: No interventions were ordered today.  Patient will trial duloxetine first.  He is reluctant to consider injections because he is afraid of needles.  If this does not provide adequate relief of pain I would recommend medial branch blocks versus facet joint injections targeting the L4-5 facet joints.  - This patient would benefit from preprocedure sedation if possible.    5.  PATIENT EDUCATION: Patient is in agreement the above plan.  All questions were answered.    6.  FOLLOW-UP: Patient is going to follow-up with me in 4 weeks to see how he is doing with the Cymbalta.  If he has questions or concerns in the meantime, he should not hesitate to call.  Subjective:     Beh G Dwe is a 43 y.o. male who presents today for follow-up regarding left greater than right low back pain with intermittent radiation into the left lower extremity..  Patient had a left L4-5 microdiscectomy February 17, 2023 with Dr. Zuniga.  Patient reports 50% improvement in his pain overall since surgery.  He states that he is no longer having the sharp severe pain down the leg.  He describes now more of an aching pain that is worse in his back.  The pain no longer wakes him at night.  However, his pain continues to interfere with activity, especially lifting.  He returns to discuss treatment options.      Patient complains of left greater than right low back pain.  Pain is 60 to 70% left-sided and 30 to 40% right-sided.  He has intermittent pain radiating down the leg.  He states that his back pain is 90% of his pain and his leg pain is 10% of his pain.  The back pain is constant and never goes away.  He is able to manage the leg pain with exercise.  When he experiences the leg pain that radiates into the buttock, down the lateral thigh, into the lateral calf.  He has some numbness and tingling in the  same distribution as his pain.  He feels a weak sensation in the left leg.  He rates his pain today as an 8 of 10.  At its worst it is an 8 of 10.  Pain is aggravated with transitioning from seated to standing, lifting, walking more than 10 to 20 minutes, sitting more than 10 to 20 minutes, tends to be worse when he first wakes up and gets out of bed in the morning.  Pain is alleviated with exercise.  He denies any new symptoms since he was last seen.  Denies loss of bowel or bladder control.  Denies fevers.    Treatment to date:  -Left L4-5 microdiscectomy 5 or 17, 2023  - Currently in physical therapy  - Also did physical therapy April through August 2023  - Also did physical therapy January to February 2020  - Oxycodone not helpful  - Hydrocodone not helpful  - Naproxen not helpful  - Tylenol not helpful  - Nabumetone not helpful  - Tramadol not helpful  - Gabapentin caused dizziness  - Medrol Dosepak      Review of Systems:  Positive for numbness/tingling, weakness, pain much worse at night.  Negative for loss of bowel/bladder control, inability to urinate, headache, trip/stumble/falls, difficulty swallowing, difficulty with hand skills, fevers, unintentional weight loss.     Objective:   CONSTITUTIONAL:  Vital signs as above.  Patient is in no acute distress.  The patient is well nourished and well groomed.    PSYCHIATRIC:  The patient is awake, alert, oriented to person, place and time.  The patient is answering questions appropriately with clear speech.  Normal affect.  HEENT: Normocephalic, atraumatic.  Sclera clear.    SKIN:  Skin over the face, posterior torso, bilateral upper and lower extremities is clean, dry, intact without rashes.  VASCULAR: No significant lower extremity edema.  MUSCULOSKELETAL:  Gait is nonantalgic.  Able to ambulate on toes and heels bilaterally without difficulty.  No significant tenderness palpation bilateral lower lumbar paraspinous muscles.  Lumbar flexion mildly restricted.   Lumbar extension mildly restricted.  Lumbar facet loading maneuvers reproduce low back pain bilaterally.  5/5 strength bilateral hip flexors, knee flexors/extensors, bilateral ankle dorsiflexors, bilateral ankle plantar flexors, EHL  NEUROLOGICAL: 2+ patellar and Achilles reflexes which are symmetric bilaterally.  Diminished sensation left L5 dermatome.      RESULTS:   I reviewed the MRI lumbar spine from [facility] dated October 1, 2023.  This shows resolution of the previous left L4-5 disc extrusion.  At L5-S1 there is moderate to severe right foraminal stenosis.  There is also moderate bilateral foraminal stenosis L4-5.      Again, thank you for allowing me to participate in the care of your patient.        Sincerely,        Nikia Saleh PA-C

## 2023-10-25 ENCOUNTER — THERAPY VISIT (OUTPATIENT)
Dept: PHYSICAL THERAPY | Facility: REHABILITATION | Age: 43
End: 2023-10-25
Payer: COMMERCIAL

## 2023-10-25 DIAGNOSIS — M47.816 LUMBAR FACET ARTHROPATHY: ICD-10-CM

## 2023-10-25 DIAGNOSIS — M54.42 CHRONIC LEFT-SIDED LOW BACK PAIN WITH LEFT-SIDED SCIATICA: ICD-10-CM

## 2023-10-25 DIAGNOSIS — G89.29 CHRONIC LEFT-SIDED LOW BACK PAIN WITH LEFT-SIDED SCIATICA: ICD-10-CM

## 2023-10-25 DIAGNOSIS — M51.369 DDD (DEGENERATIVE DISC DISEASE), LUMBAR: Primary | ICD-10-CM

## 2023-10-25 PROCEDURE — 97110 THERAPEUTIC EXERCISES: CPT | Mod: GP

## 2023-10-25 PROCEDURE — 97140 MANUAL THERAPY 1/> REGIONS: CPT | Mod: GP

## 2023-11-01 ENCOUNTER — THERAPY VISIT (OUTPATIENT)
Dept: PHYSICAL THERAPY | Facility: REHABILITATION | Age: 43
End: 2023-11-01
Payer: COMMERCIAL

## 2023-11-01 DIAGNOSIS — G89.29 CHRONIC LEFT-SIDED LOW BACK PAIN WITH LEFT-SIDED SCIATICA: ICD-10-CM

## 2023-11-01 DIAGNOSIS — M47.816 LUMBAR FACET ARTHROPATHY: Primary | ICD-10-CM

## 2023-11-01 DIAGNOSIS — M54.42 CHRONIC LEFT-SIDED LOW BACK PAIN WITH LEFT-SIDED SCIATICA: ICD-10-CM

## 2023-11-01 PROCEDURE — 97110 THERAPEUTIC EXERCISES: CPT | Mod: GP | Performed by: PHYSICAL THERAPIST

## 2023-11-06 ENCOUNTER — OFFICE VISIT (OUTPATIENT)
Dept: FAMILY MEDICINE | Facility: CLINIC | Age: 43
End: 2023-11-06
Attending: FAMILY MEDICINE
Payer: COMMERCIAL

## 2023-11-06 VITALS
SYSTOLIC BLOOD PRESSURE: 123 MMHG | TEMPERATURE: 98 F | DIASTOLIC BLOOD PRESSURE: 81 MMHG | RESPIRATION RATE: 16 BRPM | WEIGHT: 158.75 LBS | BODY MASS INDEX: 24.92 KG/M2 | HEART RATE: 84 BPM | OXYGEN SATURATION: 98 % | HEIGHT: 67 IN

## 2023-11-06 DIAGNOSIS — M54.42 CHRONIC LEFT-SIDED LOW BACK PAIN WITH LEFT-SIDED SCIATICA: ICD-10-CM

## 2023-11-06 DIAGNOSIS — G89.29 CHRONIC LEFT-SIDED LOW BACK PAIN WITH LEFT-SIDED SCIATICA: ICD-10-CM

## 2023-11-06 DIAGNOSIS — M47.816 LUMBAR FACET ARTHROPATHY: ICD-10-CM

## 2023-11-06 DIAGNOSIS — H65.02 NON-RECURRENT ACUTE SEROUS OTITIS MEDIA OF LEFT EAR: ICD-10-CM

## 2023-11-06 DIAGNOSIS — Z23 IMMUNIZATION DUE: ICD-10-CM

## 2023-11-06 DIAGNOSIS — M51.369 DDD (DEGENERATIVE DISC DISEASE), LUMBAR: Primary | ICD-10-CM

## 2023-11-06 PROCEDURE — 90471 IMMUNIZATION ADMIN: CPT | Performed by: FAMILY MEDICINE

## 2023-11-06 PROCEDURE — 99214 OFFICE O/P EST MOD 30 MIN: CPT | Mod: 25 | Performed by: FAMILY MEDICINE

## 2023-11-06 PROCEDURE — 90686 IIV4 VACC NO PRSV 0.5 ML IM: CPT | Performed by: FAMILY MEDICINE

## 2023-11-06 NOTE — PROGRESS NOTES
Assessment & Plan     DDD (degenerative disc disease), lumbar  Lumbar facet arthropathy  Chronic left-sided low back pain with left-sided sciatica  Continues with PT and follows with spine clinic, can follow prn with neurosurgery. Note written for restrictions at work if patient is able to return, he should not be lifting over 15lbs. If unable to return, then we will see what we can do to assist him in other ways.     Immunization due  - INFLUENZA VACCINE IM > 6 MONTHS VALENT IIV4 (AFLURIA/FLUZONE)    Non-recurrent acute serous otitis media of left ear  Significant pain on left with edema and erythema of the TM. Will treat with augmentin.   - amoxicillin-clavulanate (AUGMENTIN) 875-125 MG tablet  Dispense: 20 tablet; Refill: 0        Sonia Wu MD  [facility] [place] Subjective Beh is a 43 year old, presenting for the following health issues:  Back Pain      History of Present Illness       Back Pain:  He presents for follow up of back pain. Patient's back pain is a chronic problem.  Location of back pain:  Right lower back and left lower back  Description of back pain: gnawing and sharp  Back pain spreads: left buttocks, left thigh and left knee    Since patient first noticed back pain, pain is: unchanged  Does back pain interfere with his job:  Yes           Pain is exacerbated by standing and sitting long periods of time and by lifting heavy things. He really wants to go back to work but is fearful of the pain from lifting. He is following diligently with PT, neurosurgery, and spine clinic.     Last visit:   Patient has had overall improvement but still pain when trying to lift things. I dont know how much he can lift at this time, he continues to see PT. Will review their notes. Patient says he would like to follow up with neurosurgery to see if he is progressing well enough. If he gets clearance to go back to work from surgery, I can evaluate patient for his limitations before he  "returns to work.      Seen by neurosurgery 10/3/2023  status post Left lumbar 4-lumbar 5 hemilaminectomy, medial /facetectomy, foraminotomy, and microdiscectomy on 02/17/2023 with Dr. Zuniga. On 2/17/2023. attempt conservative treatment with physical therapy and referral to spine clinic for further treatment options. Follow up will otherwise be on an as needed basis and he understands to contact the office should any symptoms worsen or arise.      Seen by spine clinic 10/16/2023  Plan:   PHYSICAL THERAPY: Patient is currently in physical therapy.  Encourage patient to continue with physical therapy and the home exercises.  MEDICATIONS:  -I prescribed duloxetine 30 mg daily.  We could titrate his dose higher, depending on response.  - Patient has failed to have any relief of his pain with Tylenol, naproxen, hydrocodone, oxycodone, tramadol, nabumetone, prednisone.  No additional opiates prescribed today.  INTERVENTIONS: No interventions were ordered today.  Patient will trial duloxetine first.  He is reluctant to consider injections because he is afraid of needles.  If this does not provide adequate relief of pain I would recommend medial branch blocks versus facet joint injections targeting the L4-5 facet joints. This patient would benefit from preprocedure sedation if possible.  FOLLOW-UP: Patient is going to follow-up with me in 4 weeks to see how he is doing with the Cymbalta.  If he has questions or concerns in the meantime, he should not hesitate to call.       Review of Systems   Constitutional, HEENT, cardiovascular, pulmonary, gi and gu systems are negative, except as otherwise noted.      Objective    /81   Pulse 84   Temp 98  F (36.7  C) (Oral)   Resp 16   Ht 1.695 m (5' 6.75\")   Wt 72 kg (158 lb 12 oz)   SpO2 98%   BMI 25.05 kg/m    Body mass index is 25.05 kg/m .  Physical Exam   GENERAL: healthy, alert and no distress  NECK: no adenopathy, no asymmetry, masses, or scars and thyroid normal " to palpation  HEENT: left TM intact but with edematous lower border with erythematous borders on right lower side, cerumen pressed against upper half  RESP: lungs clear to auscultation - no rales, rhonchi or wheezes  CV: regular rate and rhythm, normal S1 S2, no S3 or S4, no murmur, click or rub, no peripheral edema and peripheral pulses strong  ABDOMEN: soft, nontender, no hepatosplenomegaly, no masses and bowel sounds normal  MS: no gross musculoskeletal defects noted, no edema    No results found for any visits on 11/06/23.  No results found for this or any previous visit (from the past 24 hour(s)).

## 2023-11-06 NOTE — LETTER
November 6, 2023      Beh G Dwe  [address] [place] [address]        To Whom It May Concern:    Beh G Dwe was seen in our clinic. He may return to work with the following: limited to light duty - lifting no greater than 15 pounds and must be able to take a break for 5 mins every 1 hour to move position. These restrictions are in place for the next 6-8 weeks (11/06/23 through 01/01/2024) as he undergoes intense therapy and treatment. He will be reevaluated prior to that date.     If this is not an option and he needs [facility] paperwork instead of this note, please fax it to our office.   Please contact us with any questions or concerns.       Sincerely,            Sonia Wu MD  11/06/23

## 2023-11-08 ENCOUNTER — THERAPY VISIT (OUTPATIENT)
Dept: PHYSICAL THERAPY | Facility: REHABILITATION | Age: 43
End: 2023-11-08
Payer: COMMERCIAL

## 2023-11-08 DIAGNOSIS — M54.42 CHRONIC LEFT-SIDED LOW BACK PAIN WITH LEFT-SIDED SCIATICA: Primary | ICD-10-CM

## 2023-11-08 DIAGNOSIS — G89.29 CHRONIC LEFT-SIDED LOW BACK PAIN WITH LEFT-SIDED SCIATICA: Primary | ICD-10-CM

## 2023-11-08 PROCEDURE — 97112 NEUROMUSCULAR REEDUCATION: CPT | Mod: 59 | Performed by: PHYSICAL THERAPIST

## 2023-11-08 PROCEDURE — 97530 THERAPEUTIC ACTIVITIES: CPT | Mod: GP | Performed by: PHYSICAL THERAPIST

## 2023-11-08 NOTE — PROGRESS NOTES
DISCHARGE  Reason for Discharge: Patient has met all goals.  Patient chooses to discontinue therapy.    Discharge Plan: Patient to continue home program.    Referring Provider:  Olivia Brito     11/08/23 0500   Appointment Info   Signing clinician's name / credentials Quintin Contreras PT   Total/Authorized Visits 12   Visits Used 4   Medical Diagnosis Olivia Brito PA-C   PT Tx Diagnosis weakness of lumbo-pelvic-hip musculature   Precautions/Limitations s/p lumbar laminectomy   Quick Adds Certification   Progress Note/Certification   Start of Care Date 10/05/23   Onset of illness/injury or Date of Surgery 10/03/23   Therapy Frequency weekly   Predicted Duration 12 weeks   Certification date from 10/05/23   Certification date to 12/28/23   Progress Note Due Date 12/28/23       Present Yes   Preferred Language DANNI    ID [id number]   GOALS   PT Goals 2;3   PT Goal 1   Goal Identifier HEP   Goal Description Patient will demonstrate >50% compliance with home exercise program to promote independence and progress towards   Goal Progress met   Target Date 11/08/23   Date Met 11/08/23   PT Goal 2   Goal Identifier Patient will be able to lift and carry at least 20lbs for greater than 1 minute without being limited due to pain   Goal Progress met   Target Date 12/28/23   Date Met 11/08/23   PT Goal 3   Goal Identifier Patient will perform sit to stand at least 10 repetitions without use of upper extremities to improve functional trunk and lower extremity strength   Goal Progress met   Target Date 12/28/23   Date Met 11/08/23   Subjective Report   Subjective Report patient reprots feeling improvements in symptoms, and exercises have been helpful. He reports 5/10 VPRS in lower back while exercising.   Treatment Interventions (PT)   Interventions Therapeutic Procedure/Exercise;Manual Therapy;Therapeutic Activity;Neuromuscular Re-education   Therapeutic Activity   Therapeutic Activities:  dynamic activities to improve functional performance minutes (88605) 30   Skilled Intervention treadmill 2.3-2.5mph with patient using bilateral railings for balance and trunk support. sit to stands x 10 reps, squat to lift 21lb crate 2 x 3 reps.Unilateral [place] carry with 20 lb weight walking in figure 8 around cones 15 ft apart x 6 (3 carrying in R and 3 carrying with L)   Neuromuscular Re-education   Neuromuscular re-ed of mvmt, balance, coord, kinesthetic sense, posture, proprioception minutes (58844) 8   Neuro Re-ed 1 - Details straight arm plank 45 seconds for abdominal/core stabilization. bridge march x 10 reps B. bird dog x 10 reps B   Education   Learner/Method Patient   Education Comments POC and HEP   Plan   Home program PTRx [id number]   Plan for next session discharged.   Comments   Comments Pt reports improvements in symptoms. Patient feels the exercises have been helpful, and wants to continue with home exercises to keep progressing. Patient has achieved their goals. Patient is appropriate for discharge except that patient has not yet returned to work due to not feeling he can tolerate sitting and driving as much as he would need to for work. Patient was offered additional visits however patient prefers to continue with HEP independently.   Total Session Time   Timed Code Treatment Minutes 38   Total Treatment Time (sum of timed and untimed services) 38     Quintin Contreras, PT

## 2023-11-09 NOTE — PROGRESS NOTES
Assessment:   Beh G Dwe is a 43 y.o. male with past medical history significant for nicotine dependence who presents today for follow-up regarding chronic low back pain with intermittent radiation into the left lower extremity.  Patient had left L4-5 microdiscectomy February 17, 2023 with Dr. Zuniga.  Patient reports about 50% improvement in pain following surgery, but he continues to have aching back pain which is bothersome and interferes with his ability to lift.  My review of an MRI lumbar spine shows resolution of the previously seen left paracentral disc extrusion at L4-5.  There is moderate to severe right foraminal stenosis L5-S1 and moderate bilateral foraminal stenosis L4-5.  Patient is symptomatic from both lumbar radiculitis and lumbar facet arthropathy.  Patient reports left L5 radicular pain and paresthesias.  I would like to check an EMG to see if there is any active nerve injury or if this represents chronic radiculopathy.  - On exam he reported a sensory deficit left L5 dermatome.  Strength and reflexes intact.  - Patient last saw neurosurgery October 3, 2023.  They recommended physical therapy and conservative treatment.     Plan:     A shared decision making plan was used.  The patient's values and choices were respected.  The following represents what was discussed and decided upon by the physician assistant and the patient.  A telephone  is present for the visit.    1.  DIAGNOSTIC TESTS:  - I reviewed the MRI lumbar spine.  - I ordered an EMG left lower extremity to evaluate for radiculopathy.    2.  PHYSICAL THERAPY: Patient is currently in physical therapy.  He has had 4 sessions so far.  Encourage patient to continue with physical therapy and the home exercises.    3.  MEDICATIONS:  - Patient has been taking duloxetine 30 mg at bedtime.  He states this is helpful but does cause some drowsiness.  Hopefully drowsiness will subside as he continues to get accustomed to the medication.   I do not titrate his dose higher due to drowsiness.  - Patient has failed to have any relief of his pain with Tylenol, naproxen, hydrocodone, oxycodone, tramadol, nabumetone, prednisone.  No additional opiates prescribed today.    4.  INTERVENTIONS: No interventions were ordered today.  I offered interventional pain management but patient wants to trial additional physical therapy first.  - We will await the results of his EMG.  If this shows active radiculopathy I would recommend an epidural steroid injection targeting the affected nerve root.  - If leg symptoms improve but he continues have axial low back pain I recommend bilateral lower lumbar facet joint injections.  I have confirmed with the PA team that facet joint injections are covered by his insurance.  - This patient would benefit from preprocedure sedation if possible.    5.  PATIENT EDUCATION: Patient is in agreement the above plan.  All questions were answered.    6.  FOLLOW-UP: A nurse will call the patient with the results of his EMG.  At that time I may recommend an epidural steroid injection.  If he has questions or concerns in the meantime, he should not hesitate to call.  Subjective:     Beh G Dwe is a 43 y.o. male who presents today for follow-up regarding left greater than right low back pain with intermittent radiation into the left lower extremity.  I last saw this patient October 16, 2023.  I prescribed duloxetine and I recommended physical therapy.  Patient reports 20% improvement in his pain.      Patient complains of left greater than right low back pain.  Pain spans across low back in a broadband distribution at the belt line.  He then describes pain and numbness/tingling that radiates down the lateral thigh into the anterolateral Incision to the dorsal foot, ending in the great toe.  He has had numbness and tingling in the left lower extremity ever since his surgery.  He denies weakness.  He rates his pain today as a 5 out of 10.  Pain  is aggravated with moving his low back.  He reports that doing exercises helps with his leg pain but there is nothing that he can do to help with his back pain.    Treatment to date:  -Left L4-5 microdiscectomy 5 or 17, 2023  - Currently in physical therapy, 4 sessions so far  - Also did physical therapy April through August 2023  - Also did physical therapy January to February 2020  - Oxycodone not helpful  - Hydrocodone not helpful  - Naproxen not helpful  - Tylenol not helpful  - Nabumetone not helpful  - Tramadol not helpful  - Gabapentin caused dizziness  - Medrol Dosepak  - Duloxetine helpful but causes drowsiness      Review of Systems:  Positive for numbness/tingling, pain much worse at night.  Negative for weakness, loss of bowel/bladder control, inability to urinate, headache, trip/stumble/falls, difficulty swallowing, difficulty with hand skills, fevers, unintentional weight loss.     Objective:   CONSTITUTIONAL:  Vital signs as above.  Patient is in no acute distress.  The patient is well nourished and well groomed.    PSYCHIATRIC:  The patient is awake, alert, oriented to person, place and time.  The patient is answering questions appropriately with clear speech.  Normal affect.  HEENT: Normocephalic, atraumatic.  Sclera clear.    SKIN:  Skin over the face, posterior torso, bilateral upper and lower extremities is clean, dry, intact without rashes.  VASCULAR: No significant lower extremity edema.  MUSCULOSKELETAL:  Gait is nonantalgic.  Able to ambulate on toes and heels bilaterally without difficulty.  No significant tenderness palpation bilateral lower lumbar paraspinous muscles.  Lumbar flexion mildly restricted.  Lumbar extension mildly restricted.  Lumbar facet loading maneuvers reproduce low back pain bilaterally.  5/5 strength bilateral hip flexors, knee flexors/extensors, bilateral ankle dorsiflexors, bilateral ankle plantar flexors, EHL  NEUROLOGICAL: 2+ patellar and Achilles reflexes which are  symmetric bilaterally.  Diminished sensation left L5 dermatome.      RESULTS:   I reviewed the MRI lumbar spine from [facility] dated October 1, 2023.  This shows resolution of the previous left L4-5 disc extrusion.  At L5-S1 there is moderate to severe right foraminal stenosis.  There is also moderate bilateral foraminal stenosis L4-5.

## 2023-11-13 ENCOUNTER — OFFICE VISIT (OUTPATIENT)
Dept: PHYSICAL MEDICINE AND REHAB | Facility: CLINIC | Age: 43
End: 2023-11-13
Payer: COMMERCIAL

## 2023-11-13 VITALS
HEART RATE: 82 BPM | OXYGEN SATURATION: 97 % | HEIGHT: 67 IN | WEIGHT: 156.8 LBS | SYSTOLIC BLOOD PRESSURE: 126 MMHG | DIASTOLIC BLOOD PRESSURE: 77 MMHG | BODY MASS INDEX: 24.61 KG/M2

## 2023-11-13 DIAGNOSIS — Z98.890 HISTORY OF LUMBAR SURGERY: Primary | ICD-10-CM

## 2023-11-13 DIAGNOSIS — M47.816 LUMBAR FACET ARTHROPATHY: ICD-10-CM

## 2023-11-13 DIAGNOSIS — M54.16 LUMBAR RADICULAR PAIN: ICD-10-CM

## 2023-11-13 DIAGNOSIS — R20.2 PARESTHESIA: ICD-10-CM

## 2023-11-13 PROCEDURE — 99214 OFFICE O/P EST MOD 30 MIN: CPT | Performed by: PHYSICIAN ASSISTANT

## 2023-11-13 ASSESSMENT — PAIN SCALES - GENERAL: PAINLEVEL: MODERATE PAIN (5)

## 2023-11-13 NOTE — LETTER
11/13/2023         RE: Beh G Dwe  [address] [place] [address]        Dear Colleague,    Thank you for referring your patient, Beh G Dwe, to the [facility]UR[facility]ERY. Please see a copy of my visit note below.    Assessment:   Beh G Dwe is a 43 y.o. male with past medical history significant for nicotine dependence who presents today for follow-up regarding chronic low back pain with intermittent radiation into the left lower extremity.  Patient had left L4-5 microdiscectomy February 17, 2023 with Dr. Zuniga.  Patient reports about 50% improvement in pain following surgery, but he continues to have aching back pain which is bothersome and interferes with his ability to lift.  My review of an MRI lumbar spine shows resolution of the previously seen left paracentral disc extrusion at L4-5.  There is moderate to severe right foraminal stenosis L5-S1 and moderate bilateral foraminal stenosis L4-5.  Patient is symptomatic from both lumbar radiculitis and lumbar facet arthropathy.  Patient reports left L5 radicular pain and paresthesias.  I would like to check an EMG to see if there is any active nerve injury or if this represents chronic radiculopathy.  - On exam he reported a sensory deficit left L5 dermatome.  Strength and reflexes intact.  - Patient last saw neurosurgery October 3, 2023.  They recommended physical therapy and conservative treatment.     Plan:     A shared decision making plan was used.  The patient's values and choices were respected.  The following represents what was discussed and decided upon by the physician assistant and the patient.  A telephone  is present for the visit.    1.  DIAGNOSTIC TESTS:  - I reviewed the MRI lumbar spine.  - I ordered an EMG left lower extremity to evaluate for radiculopathy.    2.  PHYSICAL THERAPY: Patient is currently in physical therapy.  He has had 4 sessions so far.  Encourage patient to continue with physical therapy  and the home exercises.    3.  MEDICATIONS:  - Patient has been taking duloxetine 30 mg at bedtime.  He states this is helpful but does cause some drowsiness.  Hopefully drowsiness will subside as he continues to get accustomed to the medication.  I do not titrate his dose higher due to drowsiness.  - Patient has failed to have any relief of his pain with Tylenol, naproxen, hydrocodone, oxycodone, tramadol, nabumetone, prednisone.  No additional opiates prescribed today.    4.  INTERVENTIONS: No interventions were ordered today.  I offered interventional pain management but patient wants to trial additional physical therapy first.  - We will await the results of his EMG.  If this shows active radiculopathy I would recommend an epidural steroid injection targeting the affected nerve root.  - If leg symptoms improve but he continues have axial low back pain I recommend bilateral lower lumbar facet joint injections.  I have confirmed with the PA team that facet joint injections are covered by his insurance.  - This patient would benefit from preprocedure sedation if possible.    5.  PATIENT EDUCATION: Patient is in agreement the above plan.  All questions were answered.    6.  FOLLOW-UP: A nurse will call the patient with the results of his EMG.  At that time I may recommend an epidural steroid injection.  If he has questions or concerns in the meantime, he should not hesitate to call.  Subjective:     Beh G Dwe is a 43 y.o. male who presents today for follow-up regarding left greater than right low back pain with intermittent radiation into the left lower extremity.  I last saw this patient October 16, 2023.  I prescribed duloxetine and I recommended physical therapy.  Patient reports 20% improvement in his pain.      Patient complains of left greater than right low back pain.  Pain spans across low back in a broadband distribution at the belt line.  He then describes pain and numbness/tingling that radiates down the  lateral thigh into the anterolateral Incision to the dorsal foot, ending in the great toe.  He has had numbness and tingling in the left lower extremity ever since his surgery.  He denies weakness.  He rates his pain today as a 5 out of 10.  Pain is aggravated with moving his low back.  He reports that doing exercises helps with his leg pain but there is nothing that he can do to help with his back pain.    Treatment to date:  -Left L4-5 microdiscectomy 5 or 17, 2023  - Currently in physical therapy, 4 sessions so far  - Also did physical therapy April through August 2023  - Also did physical therapy January to February 2020  - Oxycodone not helpful  - Hydrocodone not helpful  - Naproxen not helpful  - Tylenol not helpful  - Nabumetone not helpful  - Tramadol not helpful  - Gabapentin caused dizziness  - Medrol Dosepak  - Duloxetine helpful but causes drowsiness      Review of Systems:  Positive for numbness/tingling, pain much worse at night.  Negative for weakness, loss of bowel/bladder control, inability to urinate, headache, trip/stumble/falls, difficulty swallowing, difficulty with hand skills, fevers, unintentional weight loss.     Objective:   CONSTITUTIONAL:  Vital signs as above.  Patient is in no acute distress.  The patient is well nourished and well groomed.    PSYCHIATRIC:  The patient is awake, alert, oriented to person, place and time.  The patient is answering questions appropriately with clear speech.  Normal affect.  HEENT: Normocephalic, atraumatic.  Sclera clear.    SKIN:  Skin over the face, posterior torso, bilateral upper and lower extremities is clean, dry, intact without rashes.  VASCULAR: No significant lower extremity edema.  MUSCULOSKELETAL:  Gait is nonantalgic.  Able to ambulate on toes and heels bilaterally without difficulty.  No significant tenderness palpation bilateral lower lumbar paraspinous muscles.  Lumbar flexion mildly restricted.  Lumbar extension mildly restricted.  Lumbar  facet loading maneuvers reproduce low back pain bilaterally.  5/5 strength bilateral hip flexors, knee flexors/extensors, bilateral ankle dorsiflexors, bilateral ankle plantar flexors, EHL  NEUROLOGICAL: 2+ patellar and Achilles reflexes which are symmetric bilaterally.  Diminished sensation left L5 dermatome.      RESULTS:   I reviewed the MRI lumbar spine from [facility] dated October 1, 2023.  This shows resolution of the previous left L4-5 disc extrusion.  At L5-S1 there is moderate to severe right foraminal stenosis.  There is also moderate bilateral foraminal stenosis L4-5.      Again, thank you for allowing me to participate in the care of your patient.        Sincerely,        Nikia Saleh PA-C

## 2023-12-11 ENCOUNTER — TELEPHONE (OUTPATIENT)
Dept: PHYSICAL MEDICINE AND REHAB | Facility: CLINIC | Age: 43
End: 2023-12-11

## 2023-12-11 ENCOUNTER — OFFICE VISIT (OUTPATIENT)
Dept: PHYSICAL MEDICINE AND REHAB | Facility: CLINIC | Age: 43
End: 2023-12-11
Attending: PHYSICIAN ASSISTANT
Payer: COMMERCIAL

## 2023-12-11 DIAGNOSIS — R20.2 PARESTHESIA: ICD-10-CM

## 2023-12-11 DIAGNOSIS — M54.16 LUMBAR RADICULAR PAIN: ICD-10-CM

## 2023-12-11 PROCEDURE — 95909 NRV CNDJ TST 5-6 STUDIES: CPT | Performed by: PHYSICAL MEDICINE & REHABILITATION

## 2023-12-11 PROCEDURE — 95886 MUSC TEST DONE W/N TEST COMP: CPT | Mod: LT | Performed by: PHYSICAL MEDICINE & REHABILITATION

## 2023-12-11 NOTE — PATIENT INSTRUCTIONS
Thank you for choosing the [facility] for your EMG testing.    The ordering provider will receive your final EMG results within the next few days.  Please follow up with your provider for the results and further treatment recommendations.

## 2023-12-11 NOTE — TELEPHONE ENCOUNTER
Call placed to patient with provider's results and recommendations with assistance of [place]  #[id number]. Pt stated understanding. Pt reports he is currently doing okay with PT exercises. He plans to continue with this. He will call in the future if his symptoms worsen.

## 2023-12-11 NOTE — TELEPHONE ENCOUNTER
----- Message from Nikia Saleh PA-C sent at 12/11/2023  9:12 AM CST -----  Regarding: EMG results  Please call this patient and let him know that his EMG is normal.  There is no evidence for nerve injury which is great news.  If patient is still having significant symptoms I recommend left L4-5 and left L5-S1 transforaminal epidural steroid injections.

## 2023-12-11 NOTE — LETTER
12/11/2023         RE: Beh G Dwe  [address]  [place] [address]        Dear Colleague,    Thank you for referring your patient, Beh G Dwe, to the [facility] SPINE [facility] NEUROSURGERY. Please see a copy of my visit note below.    [facility]  [address]  [place] [address]  Office: [phone] Fax: [phone]    Electromyography and Nerve Conduction Study Report        Indication: Patient presents at the request of Nikia Saleh for left lower extremity EMG.  Telephone  present throughout the examination and interview.  He has low back pain with left lower extremity pain and paresthesias in the posterior lateral left leg to the dorsal left foot.  On exam he has diffuse decree sensation to light touch throughout the left ankle and foot.  2+ patellar and Achilles reflexes with downgoing toes.  Normal muscle strength throughout the major muscle groups of the bilateral lower extremities.              Pt Exam Discussion (Communication Barriers):  Electromyography and nerve conduction testing, including associated discomfort, risks, benefits, and alternatives was discussed with the patient prior to the procedure.  No learning/ communication barriers; patient verbalized understanding of procedure.  Informed consent was obtained.           Pt Assessment:  Testing was successfully completed; patient tolerated testing well.       Blood Thinners: None Skin Temperature: Warmed 30.3                   EMG/NCS  results:     Nerve Conduction Studies  Motor Sites      Segment Distal Latency Neg. Amp CV F-Latency F-Estimate Comment   Site  (ms) (mV) (m/s) (ms) (ms)    Left Fibular (EDB) Motor   Ankle Ankle-EDB 3.4 2.8       Knee Knee-Ankle 10.8 2.7 52      Left Tibial (AH) Motor   Ankle Ankle-AH 3.6 6.5       Knee Knee-Ankle 11.6 7.4 51        Sensory Sites      Onset Lat Peak Lat Amp CV Comment   Site (ms) (ms) ( V) (m/s)    Left Sural Sensory   B-Ankle 2.7  3.3 17 -      H-Reflex Sites      M-Lat H Lat H Neg Amp   Site (ms) (ms) (mV)   Left Tibial (Soleus) H-Reflex   Pop Fossa 9.2 30.3 1.51   Right Tibial (Soleus) H-Reflex   Pop Fossa 9.5 30.3 1.29     H-Reflex Sites      Lt. H Lat Rt. H Lat L-R H Lat L-R H Neg Amp   Site (ms) (ms) (ms) Norm (mV)   Tibial (Soleus) H-Reflex   Pop Fossa 30.3 30.3 0  < 3.0 0.22       NCS Waveforms:    Motor         Sensory       H-Reflex           Electromyography     Side Muscle Nerve Root Ins Act Fibs Psw Fasc Recrt Dur Amp Poly Comment   Left AntTibialis Dp Br Fibular L4-5 Nml Nml Nml Nml Nml Nml Nml 0    Left Gastroc Tibial S1-2 Nml Nml Nml Nml Nml Nml Nml 0    Left Fibularis Long Sup Br Fibular L5-S1 Nml Nml Nml Nml Nml Nml Nml 0    Left VastusLat Femoral L2-4 Nml Nml Nml Nml Nml Nml Nml 0    Left TensorFascLat SupGluteal L4-5, S1 Nml Nml Nml Nml Nml Nml Nml 0          Comment NCS: Normal study  1.  Normal nerve conduction studies left lower extremity.  This includes normal left sural SNAP, peroneal and tibial CMAP's, and symmetric tibial H reflexes.    Comment EMG: Normal study  1.  Normal needle EMG left lower extremity.    Interpretation: Normal study    1. There is no electrodiagnostic evidence of lumbosacral radiculopathy, lumbosacral plexopathy, or focal neuropathy in the left lower extremity.  Specifically, there is no electrodiagnostic evidence of a left L5/S1 radiculopathy or peroneal neuropathy at the knee.    The testing was completed in its entirety by the physician.      It was our pleasure caring for your patient today, if there any questions or concerns please do not hesitate to contact us.      Again, thank you for allowing me to participate in the care of your patient.        Sincerely,        Pavan Love, DO

## 2023-12-11 NOTE — PROGRESS NOTES
[facility]  [address] [place] [address] [address]  [place] [address]  Office: [phone] Fax: [phone]    Electromyography and Nerve Conduction Study Report        Indication: Patient presents at the request of Nikia Saleh for left lower extremity EMG.  Telephone  present throughout the examination and interview.  He has low back pain with left lower extremity pain and paresthesias in the posterior lateral left leg to the dorsal left foot.  On exam he has diffuse decree sensation to light touch throughout the left ankle and foot.  2+ patellar and Achilles reflexes with downgoing toes.  Normal muscle strength throughout the major muscle groups of the bilateral lower extremities.              Pt Exam Discussion (Communication Barriers):  Electromyography and nerve conduction testing, including associated discomfort, risks, benefits, and alternatives was discussed with the patient prior to the procedure.  No learning/ communication barriers; patient verbalized understanding of procedure.  Informed consent was obtained.           Pt Assessment:  Testing was successfully completed; patient tolerated testing well.       Blood Thinners: None Skin Temperature: Warmed 30.3                   EMG/NCS  results:     Nerve Conduction Studies  Motor Sites      Segment Distal Latency Neg. Amp CV F-Latency F-Estimate Comment   Site  (ms) (mV) (m/s) (ms) (ms)    Left Fibular (EDB) Motor   Ankle Ankle-EDB 3.4 2.8       Knee Knee-Ankle 10.8 2.7 52      Left Tibial (AH) Motor   Ankle Ankle-AH 3.6 6.5       Knee Knee-Ankle 11.6 7.4 51        Sensory Sites      Onset Lat Peak Lat Amp CV Comment   Site (ms) (ms) ( V) (m/s)    Left Sural Sensory   B-Ankle 2.7 3.3 17 -      H-Reflex Sites      M-Lat H Lat H Neg Amp   Site (ms) (ms) (mV)   Left Tibial (Soleus) H-Reflex   Pop Fossa 9.2 30.3 1.51   Right Tibial (Soleus) H-Reflex   Pop Fossa 9.5 30.3 1.29     H-Reflex Sites      Lt. H Lat Rt. H Lat L-R H  Lat L-R H Neg Amp   Site (ms) (ms) (ms) Norm (mV)   Tibial (Soleus) H-Reflex   Pop Fossa 30.3 30.3 0  < 3.0 0.22       NCS Waveforms:    Motor         Sensory       H-Reflex           Electromyography     Side Muscle Nerve Root Ins Act Fibs Psw Fasc Recrt Dur Amp Poly Comment   Left AntTibialis Dp Br Fibular L4-5 Nml Nml Nml Nml Nml Nml Nml 0    Left Gastroc Tibial S1-2 Nml Nml Nml Nml Nml Nml Nml 0    Left Fibularis Long Sup Br Fibular L5-S1 Nml Nml Nml Nml Nml Nml Nml 0    Left VastusLat Femoral L2-4 Nml Nml Nml Nml Nml Nml Nml 0    Left TensorFascLat SupGluteal L4-5, S1 Nml Nml Nml Nml Nml Nml Nml 0          Comment NCS: Normal study  1.  Normal nerve conduction studies left lower extremity.  This includes normal left sural SNAP, peroneal and tibial CMAP's, and symmetric tibial H reflexes.    Comment EMG: Normal study  1.  Normal needle EMG left lower extremity.    Interpretation: Normal study    1. There is no electrodiagnostic evidence of lumbosacral radiculopathy, lumbosacral plexopathy, or focal neuropathy in the left lower extremity.  Specifically, there is no electrodiagnostic evidence of a left L5/S1 radiculopathy or peroneal neuropathy at the knee.    The testing was completed in its entirety by the physician.      It was our pleasure caring for your patient today, if there any questions or concerns please do not hesitate to contact us.

## 2023-12-28 ENCOUNTER — OFFICE VISIT (OUTPATIENT)
Dept: FAMILY MEDICINE | Facility: CLINIC | Age: 43
End: 2023-12-28
Attending: FAMILY MEDICINE
Payer: COMMERCIAL

## 2023-12-28 VITALS
HEART RATE: 84 BPM | TEMPERATURE: 97.6 F | BODY MASS INDEX: 25.59 KG/M2 | OXYGEN SATURATION: 100 % | WEIGHT: 163.04 LBS | DIASTOLIC BLOOD PRESSURE: 68 MMHG | SYSTOLIC BLOOD PRESSURE: 116 MMHG | RESPIRATION RATE: 16 BRPM | HEIGHT: 67 IN

## 2023-12-28 DIAGNOSIS — M54.42 CHRONIC LEFT-SIDED LOW BACK PAIN WITH LEFT-SIDED SCIATICA: ICD-10-CM

## 2023-12-28 DIAGNOSIS — M47.816 LUMBAR FACET ARTHROPATHY: ICD-10-CM

## 2023-12-28 DIAGNOSIS — M51.369 DDD (DEGENERATIVE DISC DISEASE), LUMBAR: Primary | ICD-10-CM

## 2023-12-28 DIAGNOSIS — G89.29 CHRONIC LEFT-SIDED LOW BACK PAIN WITH LEFT-SIDED SCIATICA: ICD-10-CM

## 2023-12-28 PROCEDURE — 99213 OFFICE O/P EST LOW 20 MIN: CPT | Performed by: FAMILY MEDICINE

## 2023-12-28 NOTE — PROGRESS NOTES
Assessment & Plan     DDD (degenerative disc disease), lumbar  Chronic left-sided low back pain with left-sided sciatica  Lumbar facet arthropathy  Last visit, given restrictions for work through 1/1/23. Plan is for injection 1/15/24 - will extend restrictions to 2/1/23 and see him before they are lifted. Stopped his duloxetine, he took it on Tuesday and is feeling much better. Will let spine clinic know, he declines any replacement for it now. Would consider TCA? He seems to have a lot of sedation with a lot of medications and has failed most other options.           Chelo phone  ID#[id number]       Return in about 1 month (around 1/28/2024).      Sonia Wu MD  [facility] Subjective Beh is a 43 year old, presenting for the following health issues:  Back Pain (Lavonne his L leg it feels like it is a pulling aching sensation. )      History of Present Illness       Back Pain:  He presents for follow up of back pain. Patient's back pain is a recurring problem.  Location of back pain:  Right lower back, left lower back, right middle of back, left middle of back and right upper back  Description of back pain: dull ache and other  Back pain spreads: left thigh and left foot    Since patient first noticed back pain, pain is: unchanged  Does back pain interfere with his job:  Yes        He did get the EMG results and understands. He still has pain in his back, still going to PT.   Plan is for epidural injection on 1/15/2024.   He says the premedication makes him feel very sluggish.     - Patient has failed to have any relief of his pain with Tylenol, naproxen, hydrocodone, oxycodone, tramadol, nabumetone, prednisone.  No additional opiates prescribed today.     Last visit:   Restrictions given for work.     Since then seen by spine clinic, PT   Regarding: EMG results  Please call this patient and let him know that his EMG is normal.  There is no evidence for nerve injury which is great  "news.  If patient is still having significant symptoms I recommend left L4-5 and left L5-S1 transforaminal epidural steroid injections.      Review of Systems   Constitutional, HEENT, cardiovascular, pulmonary, gi and gu systems are negative, except as otherwise noted.      Objective    /68   Pulse 84   Temp 97.6  F (36.4  C) (Temporal)   Resp 16   Ht 1.695 m (5' 6.75\")   Wt 74 kg (163 lb 0.6 oz)   SpO2 100%   BMI 25.73 kg/m    Body mass index is 25.73 kg/m .  Physical Exam   GENERAL: healthy, alert and no distress  NECK: no adenopathy, no asymmetry, masses, or scars and thyroid normal to palpation  RESP: lungs clear to auscultation - no rales, rhonchi or wheezes  CV: regular rate and rhythm, normal S1 S2, no S3 or S4, no murmur, click or rub, no peripheral edema and peripheral pulses strong  ABDOMEN: soft, nontender, no hepatosplenomegaly, no masses and bowel sounds normal  MS: no gross musculoskeletal defects noted, no edema    No results found for any visits on 12/28/23.  No results found for this or any previous visit (from the past 24 hour(s)).                  "

## 2023-12-28 NOTE — Clinical Note
FELIPA, patient stopped the duloxetine a few days ago due to the sedation side effects. He says he feels so much better off of them, declined any replacement for it at this time.

## 2023-12-28 NOTE — LETTER
December 28, 2023      Beh G Dwe  [address] [place] [address]        To Whom It May Concern:    Beh G Dwe was seen in our clinic. He may return to work with the following: limited to light duty - lifting no greater than 15 pounds and must be able to take a break for 5 mins every 1 hour to move position. These restrictions are in place through 2/1/2023, as he undergoes intense therapy and treatment. He is undergoing a procedure on 1/15/2024 and this should be plenty of time to recover.      If this is not an option and he needs [facility] paperwork instead of this note, please fax it to our office.     Please contact us with any questions or concerns.       Sincerely,            Sonia Wu

## 2024-01-24 ENCOUNTER — OFFICE VISIT (OUTPATIENT)
Dept: FAMILY MEDICINE | Facility: CLINIC | Age: 44
End: 2024-01-24
Payer: COMMERCIAL

## 2024-01-24 VITALS
BODY MASS INDEX: 25.43 KG/M2 | RESPIRATION RATE: 20 BRPM | WEIGHT: 158.25 LBS | HEIGHT: 66 IN | OXYGEN SATURATION: 99 % | HEART RATE: 86 BPM | SYSTOLIC BLOOD PRESSURE: 118 MMHG | DIASTOLIC BLOOD PRESSURE: 74 MMHG | TEMPERATURE: 97.9 F

## 2024-01-24 DIAGNOSIS — U07.1 INFECTION DUE TO 2019 NOVEL CORONAVIRUS: ICD-10-CM

## 2024-01-24 DIAGNOSIS — K25.3 ACUTE GASTRIC ULCER DUE TO HELICOBACTER PYLORI: ICD-10-CM

## 2024-01-24 DIAGNOSIS — G89.29 CHRONIC LEFT-SIDED LOW BACK PAIN WITH LEFT-SIDED SCIATICA: ICD-10-CM

## 2024-01-24 DIAGNOSIS — K28.9 GJU (GASTROJEJUNAL ULCER): ICD-10-CM

## 2024-01-24 DIAGNOSIS — M54.16 LUMBAR RADICULOPATHY: ICD-10-CM

## 2024-01-24 DIAGNOSIS — M47.816 LUMBAR FACET ARTHROPATHY: ICD-10-CM

## 2024-01-24 DIAGNOSIS — E78.5 HYPERLIPIDEMIA LDL GOAL <130: ICD-10-CM

## 2024-01-24 DIAGNOSIS — M54.42 CHRONIC LEFT-SIDED LOW BACK PAIN WITH LEFT-SIDED SCIATICA: ICD-10-CM

## 2024-01-24 DIAGNOSIS — K92.2 UPPER GI BLEED: ICD-10-CM

## 2024-01-24 DIAGNOSIS — B96.81 ACUTE GASTRIC ULCER DUE TO HELICOBACTER PYLORI: ICD-10-CM

## 2024-01-24 DIAGNOSIS — M51.369 DDD (DEGENERATIVE DISC DISEASE), LUMBAR: ICD-10-CM

## 2024-01-24 DIAGNOSIS — Z09 HOSPITAL DISCHARGE FOLLOW-UP: Primary | ICD-10-CM

## 2024-01-24 PROCEDURE — 99214 OFFICE O/P EST MOD 30 MIN: CPT | Performed by: FAMILY MEDICINE

## 2024-01-24 RX ORDER — PANTOPRAZOLE SODIUM 40 MG/1
40 TABLET, DELAYED RELEASE ORAL
COMMUNITY
Start: 2024-01-14 | End: 2024-02-21

## 2024-01-24 RX ORDER — ACETAMINOPHEN 500 MG
1000 TABLET ORAL 3 TIMES DAILY PRN
Qty: 100 TABLET | Refills: 4 | Status: SHIPPED | OUTPATIENT
Start: 2024-01-24 | End: 2024-05-22

## 2024-01-24 RX ORDER — AMOXICILLIN 500 MG/1
1000 CAPSULE ORAL 2 TIMES DAILY
Qty: 56 CAPSULE | Refills: 0 | Status: SHIPPED | OUTPATIENT
Start: 2024-01-24 | End: 2024-02-07

## 2024-01-24 RX ORDER — CYCLOBENZAPRINE HCL 5 MG
5 TABLET ORAL
Qty: 30 TABLET | Refills: 4 | Status: SHIPPED | OUTPATIENT
Start: 2024-01-24 | End: 2024-05-22

## 2024-01-24 RX ORDER — ATORVASTATIN CALCIUM 20 MG/1
20 TABLET, FILM COATED ORAL AT BEDTIME
Qty: 90 TABLET | Refills: 3 | Status: SHIPPED | OUTPATIENT
Start: 2024-01-24

## 2024-01-24 RX ORDER — CLARITHROMYCIN 500 MG
500 TABLET ORAL 2 TIMES DAILY
Qty: 28 TABLET | Refills: 0 | Status: SHIPPED | OUTPATIENT
Start: 2024-01-24 | End: 2024-02-07

## 2024-01-24 RX ORDER — METRONIDAZOLE 500 MG/1
500 TABLET ORAL 2 TIMES DAILY
Qty: 28 TABLET | Refills: 0 | Status: SHIPPED | OUTPATIENT
Start: 2024-01-24 | End: 2024-02-07

## 2024-01-24 NOTE — PROGRESS NOTES
Assessment & Plan     Hospital discharge follow-up  Upper GI bleed  Acute gastric ulcer due to Helicobacter pylori  GJU (gastrojejunal ulcer)  Multifactorial - covid +, hpylori +, chronic nsaids, frequent prednisone use for his severe back pain. Did not get [facility] message about medications for hpylori. Will change to quadruple concomitant therapy. Patient unable to grasp the dosing of bismuth treatment but iwll do that if unable to tolerate this one. Advised patient to call us if any questions re: medications or if needs assistance setting up.  hold prednisone and nsaids until after repeat EGD confirms resolution of ulcer AND after Hpylori treated and eradicated.   - amoxicillin (AMOXIL) 500 MG capsule  Dispense: 56 capsule; Refill: 0  - clarithromycin (BIAXIN) 500 MG tablet  Dispense: 28 tablet; Refill: 0  - metroNIDAZOLE (FLAGYL) 500 MG tablet  Dispense: 28 tablet; Refill: 0  - Adult GI  Referral - Procedure Only    Infection due to 2019 novel coronavirus  Relatively asymptomatic from this - but may have contributed to GI bleed.     Chronic left-sided low back pain with left-sided sciatica  DDD (degenerative disc disease), lumbar  Lumbar facet arthropathy  Lumbar radiculopathy  Significant back issues but with recent gi bleed, hold prednisone and nsaids until after repeat EGD confirms resolution of ulcer AND after Hpylori treated and eradicated. Ok to continue acetaminophen and cyclobenzaprine.   - acetaminophen (TYLENOL) 500 MG tablet  Dispense: 100 tablet; Refill: 4  - cyclobenzaprine (FLEXERIL) 5 MG tablet  Dispense: 30 tablet; Refill: 4  - STOP nsaids  - STOP prednisone        Hyperlipidemia LDL goal <130  Ok to continue statin.   - atorvastatin (LIPITOR) 20 MG tablet  Dispense: 90 tablet; Refill: 3          MED REC REQUIRED  Post Medication Reconciliation Status: discharge medications reconciled and changed, per note/orders  BMI  Estimated body mass index is 25.93 kg/m  as calculated from the  "following:    Height as of this encounter: 1.664 m (5' 5.5\").    Weight as of this encounter: 71.8 kg (158 lb 4 oz).       Return in about 1 month (around 2/24/2024) for GI bleed.      Subjective Beh is a 43 year old, presenting for the following health issues:  Hospital F/U    \[facility]\""         Hospital Follow-up Visit:    Hospital/Nursing Home/IP Rehab Facility:  [facility]  Date of Admission: 1/13/2024  Date of Discharge: 14/14/2024  Reason(s) for Admission: GI bleed 2/2 esopageal ulcer at GJ junction, covid positive    hpylori positive, they contacted him on 1/16/24 to send medications for 2 weeks. They were unable to reach him.     Was your hospitalization related to COVID-19? YES   How are you feeling today? No change  In the past 24 hours have you had shortness of breath when speaking, walking, or climbing stairs? I don't have breathing problems  Do you have a cough? I don't have a cough  When is the last time you had a fever greater than 100? unsure  Are you having any other symptoms? None   Do you have any other stressors you would like to discuss with your provider? No         Was the patient in the ICU or did the patient experience delirium during hospitalization?  No        Problems taking medications regularly:  did not answer phone when GI called, did not receive medications.   Medication changes since discharge: see notes - added hpylori treatment.   Problems adhering to non-medication therapy:  None    Summary of hospitalization:  CareEverywhere information obtained and reviewed  Diagnostic Tests/Treatments reviewed.  Follow up needed: Repeat EGD in 8 weeks (after discharge)  Other Healthcare Providers Involved in Patient s Care:         Specialist appointment - GI for repeat EGD  Update since discharge: stable.   Plan of care communicated with patient and family         Objective    /74   Pulse 86   Temp 97.9  F (36.6  C) (Oral)   Resp 20   Ht 1.664 m (5' 5.5\")   Wt 71.8 kg (158 lb 4 oz)   SpO2 " 99%   BMI 25.93 kg/m    Body mass index is 25.93 kg/m .  Physical Exam   GENERAL: alert and no distress  NECK: no adenopathy, no asymmetry, masses, or scars  RESP: lungs clear to auscultation - no rales, rhonchi or wheezes  CV: regular rate and rhythm, normal S1 S2, no S3 or S4, no murmur, click or rub, no peripheral edema  ABDOMEN: soft, nontender, no hepatosplenomegaly, no masses and bowel sounds normal  MS: no gross musculoskeletal defects noted, no edema    No results found for any visits on 01/24/24.  No results found for this or any previous visit (from the past 24 hour(s)).        Signed Electronically by: Sonia Wu MD

## 2024-02-21 ENCOUNTER — OFFICE VISIT (OUTPATIENT)
Dept: FAMILY MEDICINE | Facility: CLINIC | Age: 44
End: 2024-02-21
Attending: FAMILY MEDICINE
Payer: COMMERCIAL

## 2024-02-21 VITALS
DIASTOLIC BLOOD PRESSURE: 78 MMHG | SYSTOLIC BLOOD PRESSURE: 119 MMHG | OXYGEN SATURATION: 100 % | HEIGHT: 66 IN | TEMPERATURE: 97.5 F | RESPIRATION RATE: 12 BRPM | WEIGHT: 163 LBS | BODY MASS INDEX: 26.2 KG/M2 | HEART RATE: 74 BPM

## 2024-02-21 DIAGNOSIS — K92.2 UPPER GI BLEED: ICD-10-CM

## 2024-02-21 DIAGNOSIS — R35.0 URINARY FREQUENCY: ICD-10-CM

## 2024-02-21 DIAGNOSIS — B96.81 PEPTIC ULCER DUE TO HELICOBACTER PYLORI: ICD-10-CM

## 2024-02-21 DIAGNOSIS — E78.5 HYPERLIPIDEMIA LDL GOAL <130: Primary | ICD-10-CM

## 2024-02-21 DIAGNOSIS — K27.9 PEPTIC ULCER DUE TO HELICOBACTER PYLORI: ICD-10-CM

## 2024-02-21 LAB
ALBUMIN UR-MCNC: NEGATIVE MG/DL
APPEARANCE UR: CLEAR
BILIRUB UR QL STRIP: NEGATIVE
CHOLEST SERPL-MCNC: 199 MG/DL
COLOR UR AUTO: YELLOW
ERYTHROCYTE [DISTWIDTH] IN BLOOD BY AUTOMATED COUNT: 13 % (ref 10–15)
FASTING STATUS PATIENT QL REPORTED: NO
GLUCOSE UR STRIP-MCNC: NEGATIVE MG/DL
HCT VFR BLD AUTO: 43.1 % (ref 40–53)
HDLC SERPL-MCNC: 40 MG/DL
HGB BLD-MCNC: 14.2 G/DL (ref 13.3–17.7)
HGB UR QL STRIP: NEGATIVE
KETONES UR STRIP-MCNC: NEGATIVE MG/DL
LDLC SERPL CALC-MCNC: 94 MG/DL
LEUKOCYTE ESTERASE UR QL STRIP: NEGATIVE
MCH RBC QN AUTO: 29.5 PG (ref 26.5–33)
MCHC RBC AUTO-ENTMCNC: 32.9 G/DL (ref 31.5–36.5)
MCV RBC AUTO: 89 FL (ref 78–100)
NITRATE UR QL: NEGATIVE
NONHDLC SERPL-MCNC: 159 MG/DL
PH UR STRIP: 6.5 [PH] (ref 5–7)
PLATELET # BLD AUTO: 286 10E3/UL (ref 150–450)
RBC # BLD AUTO: 4.82 10E6/UL (ref 4.4–5.9)
SP GR UR STRIP: 1.01 (ref 1–1.03)
TRIGL SERPL-MCNC: 324 MG/DL
UROBILINOGEN UR STRIP-ACNC: 0.2 E.U./DL
WBC # BLD AUTO: 9.5 10E3/UL (ref 4–11)

## 2024-02-21 PROCEDURE — 99214 OFFICE O/P EST MOD 30 MIN: CPT | Performed by: FAMILY MEDICINE

## 2024-02-21 PROCEDURE — 80061 LIPID PANEL: CPT | Performed by: FAMILY MEDICINE

## 2024-02-21 PROCEDURE — 81003 URINALYSIS AUTO W/O SCOPE: CPT | Performed by: FAMILY MEDICINE

## 2024-02-21 PROCEDURE — 36415 COLL VENOUS BLD VENIPUNCTURE: CPT | Performed by: FAMILY MEDICINE

## 2024-02-21 PROCEDURE — 85027 COMPLETE CBC AUTOMATED: CPT | Performed by: FAMILY MEDICINE

## 2024-02-21 RX ORDER — PANTOPRAZOLE SODIUM 40 MG/1
40 TABLET, DELAYED RELEASE ORAL DAILY
Qty: 30 TABLET | Refills: 0 | Status: SHIPPED | OUTPATIENT
Start: 2024-02-21 | End: 2024-05-22

## 2024-02-21 NOTE — PROGRESS NOTES
Assessment & Plan     Hyperlipidemia LDL goal <130  - continue statin  - Lipid panel reflex to direct LDL Non-fasting    Urinary frequency  Screen DM but also check for infection - UA was normal.   - UA Macroscopic with reflex to Microscopic and Culture - Lab Collect  - UA Macroscopic with reflex to Microscopic and Culture - Lab Collect    Upper GI bleed  Peptic ulcer due to Helicobacter pylori  Completed hpylori treatment about a week ago, recommendation from GI at [facility] was to repeat after 8 weeks to ensure resolution. Symptoms improved, holding nsaids and steroids for now. EGD ordered for roughly 8 weeks from completion of treatment2/14/24  - CBC with platelets  - pantoprazole (PROTONIX) 40 MG EC tablet  Dispense: 30 tablet; Refill: 0  - Adult GI  Referral - Procedure Only  - CBC with platelets      Return in about 3 months (around 5/21/2024) for abdominal pain.      Subjective Beh is a 43 year old, presenting for the following health issues:  Recheck Medication and Gastrointestinal Problem    History of Present Illness       Reason for visit:  GI and med follow up      Abd pain improved/resolved.   He would like to recheck labs today.   He finished hpylori treatment about a week ago.   Not working as of yet, says he worries about lifting heavy things. Declined any notes or paperwork from work. Wife works so he said they are financially ok.       Last visit:   Hospital discharge follow-up  Upper GI bleed  Acute gastric ulcer due to Helicobacter pylori  GJU (gastrojejunal ulcer)  Multifactorial - covid +, hpylori +, chronic nsaids, frequent prednisone use for his severe back pain. Did not get [facility] message about medications for hpylori. Will change to quadruple concomitant therapy. Patient unable to grasp the dosing of bismuth treatment but iwll do that if unable to tolerate this one. Advised patient to call us if any questions re: medications or if needs assistance setting up.  hold prednisone  "and nsaids until after repeat EGD confirms resolution of ulcer AND after Hpylori treated and eradicated. Recommendation was 8 weeks after erradication of hpylori.        Infection due to 2019 novel coronavirus  Relatively asymptomatic from this - but may have contributed to GI bleed.      Chronic left-sided low back pain with left-sided sciatica  DDD (degenerative disc disease), lumbar  Lumbar facet arthropathy  Lumbar radiculopathy  Significant back issues but with recent gi bleed, hold prednisone and nsaids until after repeat EGD confirms resolution of ulcer AND after Hpylori treated and eradicated. Ok to continue acetaminophen and cyclobenzaprine.   - acetaminophen (TYLENOL) 500 MG tablet  Dispense: 100 tablet; Refill: 4  - cyclobenzaprine (FLEXERIL) 5 MG tablet  Dispense: 30 tablet; Refill: 4  - STOP nsaids  - STOP prednisone  Today,   Back pain is ok but he is not comfortable going back to work at this time. Says he doesn't need anything for work, they said it's ok to wait. Reviewed messages from spine clinic, did offer injections but he declined - says he is ok on his pills for now and the pain is somewhat tolerable.        Hyperlipidemia LDL goal <130  Ok to continue statin.   - atorvastatin (LIPITOR) 20 MG tablet  Dispense: 90 tablet; Refill: 3               Objective    /78   Pulse 74   Temp 97.5  F (36.4  C) (Oral)   Resp 12   Ht 1.675 m (5' 5.95\")   Wt 73.9 kg (163 lb)   SpO2 100%   BMI 26.35 kg/m    Body mass index is 26.35 kg/m .  Physical Exam   GENERAL: alert and no distress  NECK: no adenopathy, no asymmetry, masses, or scars  RESP: lungs clear to auscultation - no rales, rhonchi or wheezes  CV: regular rate and rhythm, normal S1 S2, no S3 or S4, no murmur, click or rub, no peripheral edema  ABDOMEN: soft, nontender, no hepatosplenomegaly, no masses and bowel sounds normal  MS: no gross musculoskeletal defects noted, no edema    Results for orders placed or performed in visit on 02/21/24 "   CBC with platelets     Status: Normal   Result Value Ref Range    WBC Count 9.5 4.0 - 11.0 10e3/uL    RBC Count 4.82 4.40 - 5.90 10e6/uL    Hemoglobin 14.2 13.3 - 17.7 g/dL    Hematocrit 43.1 40.0 - 53.0 %    MCV 89 78 - 100 fL    MCH 29.5 26.5 - 33.0 pg    MCHC 32.9 31.5 - 36.5 g/dL    RDW 13.0 10.0 - 15.0 %    Platelet Count 286 150 - 450 10e3/uL   UA Macroscopic with reflex to Microscopic and Culture - Lab Collect     Status: Normal    Specimen: Urine, NOS   Result Value Ref Range    Color Urine Yellow Colorless, Straw, Light Yellow, Yellow    Appearance Urine Clear Clear    Glucose Urine Negative Negative mg/dL    Bilirubin Urine Negative Negative    Ketones Urine Negative Negative mg/dL    Specific Gravity Urine 1.010 1.005 - 1.030    Blood Urine Negative Negative    pH Urine 6.5 5.0 - 7.0    Protein Albumin Urine Negative Negative mg/dL    Urobilinogen Urine 0.2 0.2, 1.0 E.U./dL    Nitrite Urine Negative Negative    Leukocyte Esterase Urine Negative Negative    Narrative    Microscopic not indicated           Signed Electronically by: Sonia Wu MD

## 2024-03-25 ENCOUNTER — TELEPHONE (OUTPATIENT)
Dept: GASTROENTEROLOGY | Facility: CLINIC | Age: 44
End: 2024-03-25
Payer: COMMERCIAL

## 2024-03-25 NOTE — TELEPHONE ENCOUNTER
"Endoscopy Scheduling Screen    Have you had a positive Covid test in the last 14 days?  No    What is your communication preference for Instructions and/or Bowel Prep?   MyChart    What insurance is in the chart?  Other:  [facility]     Ordering/Referring Provider:     Sonia Wu MD       (If ordering provider performs procedure, schedule with ordering provider unless otherwise instructed. )    BMI: Estimated body mass index is 26.35 kg/m  as calculated from the following:    Height as of 2/21/24: 1.675 m (5' 5.95\").    Weight as of 2/21/24: 73.9 kg (163 lb).     Sedation Ordered  moderate sedation.   If patient BMI > 50 do not schedule in [facility].    If patient BMI > 45 do not schedule at [facility].    Are you taking methadone or Suboxone?  No    Have you had difficulties, pain, or discomfort during past endoscopy procedures?  No    Are you taking any prescription medications for pain 3 or more times per week?   NO, No RN review required.    Do you have a history of malignant hyperthermia?  No    (Females) Are you currently pregnant?   No     Have you been diagnosed or told you have pulmonary hypertension?   No    Do you have an LVAD?  No    Have you been told you have moderate to severe sleep apnea?  No    Have you been told you have COPD, asthma, or any other lung disease?  No    Do you have any heart conditions?  No     Have you ever had or are you waiting for an organ transplant?  No. Continue scheduling, no site restrictions.    Have you had a stroke or transient ischemic attack (TIA aka \"mini stroke\" in the last 6 months?   No    Have you been diagnosed with or been told you have cirrhosis of the liver?   No    Are you currently on dialysis?   No    Do you need assistance transferring?   No    BMI: Estimated body mass index is 26.35 kg/m  as calculated from the following:    Height as of 2/21/24: 1.675 m (5' 5.95\").    Weight as of 2/21/24: 73.9 kg (163 lb).     Is patients BMI > 40 and scheduling location " [facility]?  No    Do you take an injectable medication for weight loss or diabetes (excluding insulin)?  No    Do you take the medication Naltrexone?  No    Do you take blood thinners?  No       Prep   Are you currently on dialysis or do you have chronic kidney disease?  No    Do you have a diagnosis of diabetes?  No    Do you have a diagnosis of cystic fibrosis (CF)?  No    On a regular basis do you go 3 -5 days between bowel movements?  No    BMI > 40?  No    Preferred Pharmacy:    [facility] - [address] [address]  Phone: [phone] Fax: [phone]      Final Scheduling Details     Procedure scheduled  Upper endoscopy (EGD)    Surgeon:  GLADIS      Date of procedure:  02/24/2024     Pre-OP / PAC:   No - Not required for this site.    Location  [facility] - Per order.    Sedation   MAC/Deep Sedation  PER LOCATION       Patient Reminders:   You will receive a call from a Nurse to review instructions and health history.  This assessment must be completed prior to your procedure.  Failure to complete the Nurse assessment may result in the procedure being cancelled.      On the day of your procedure, please designate an adult(s) who can drive you home stay with you for the next 24 hours. The medicines used in the exam will make you sleepy. You will not be able to drive.      You cannot take public transportation, ride share services, or non-medical taxi service without a responsible caregiver.  Medical transport services are allowed with the requirement that a responsible caregiver will receive you at your destination.  We require that drivers and caregivers are confirmed prior to your procedure.

## 2024-04-09 DIAGNOSIS — K21.9 GASTROESOPHAGEAL REFLUX DISEASE WITHOUT ESOPHAGITIS: ICD-10-CM

## 2024-04-09 DIAGNOSIS — K64.4 EXTERNAL HEMORRHOIDS: Primary | ICD-10-CM

## 2024-04-09 RX ORDER — MINERAL OIL, PETROLATUM, PHENYLEPHRINE HCL 14; 74.9; .25 G/100G; G/100G; G/100G
OINTMENT RECTAL 2 TIMES DAILY PRN
Qty: 57 G | Refills: 3 | Status: SHIPPED | OUTPATIENT
Start: 2024-04-09 | End: 2024-05-22

## 2024-04-23 ENCOUNTER — ANESTHESIA EVENT (OUTPATIENT)
Dept: SURGERY | Facility: AMBULATORY SURGERY CENTER | Age: 44
End: 2024-04-23
Payer: COMMERCIAL

## 2024-04-24 ENCOUNTER — HOSPITAL ENCOUNTER (OUTPATIENT)
Facility: AMBULATORY SURGERY CENTER | Age: 44
Discharge: HOME | End: 2024-04-24
Attending: SURGERY
Payer: COMMERCIAL

## 2024-04-24 ENCOUNTER — ANESTHESIA (OUTPATIENT)
Dept: SURGERY | Facility: AMBULATORY SURGERY CENTER | Age: 44
End: 2024-04-24
Payer: COMMERCIAL

## 2024-04-24 VITALS
WEIGHT: 160 LBS | OXYGEN SATURATION: 99 % | SYSTOLIC BLOOD PRESSURE: 128 MMHG | RESPIRATION RATE: 16 BRPM | BODY MASS INDEX: 25.71 KG/M2 | HEART RATE: 75 BPM | HEIGHT: 66 IN | DIASTOLIC BLOOD PRESSURE: 64 MMHG | TEMPERATURE: 97.3 F

## 2024-04-24 DIAGNOSIS — K92.2 UPPER GI BLEED: ICD-10-CM

## 2024-04-24 DIAGNOSIS — B96.81 PEPTIC ULCER DUE TO HELICOBACTER PYLORI: ICD-10-CM

## 2024-04-24 DIAGNOSIS — K27.9 PEPTIC ULCER DUE TO HELICOBACTER PYLORI: ICD-10-CM

## 2024-04-24 LAB — UPPER GI ENDOSCOPY: NORMAL

## 2024-04-24 RX ORDER — LIDOCAINE HYDROCHLORIDE 20 MG/ML
INJECTION, SOLUTION INFILTRATION; PERINEURAL PRN
Status: DISCONTINUED | OUTPATIENT
Start: 2024-04-24 | End: 2024-04-24

## 2024-04-24 RX ORDER — OXYCODONE HYDROCHLORIDE 5 MG/1
5 TABLET ORAL
Status: DISCONTINUED | OUTPATIENT
Start: 2024-04-24 | End: 2024-04-25 | Stop reason: HOSPADM

## 2024-04-24 RX ORDER — PROPOFOL 10 MG/ML
INJECTION, EMULSION INTRAVENOUS CONTINUOUS PRN
Status: DISCONTINUED | OUTPATIENT
Start: 2024-04-24 | End: 2024-04-24

## 2024-04-24 RX ORDER — ONDANSETRON 4 MG/1
4 TABLET, ORALLY DISINTEGRATING ORAL EVERY 30 MIN PRN
Status: DISCONTINUED | OUTPATIENT
Start: 2024-04-24 | End: 2024-04-25 | Stop reason: HOSPADM

## 2024-04-24 RX ORDER — ONDANSETRON 2 MG/ML
4 INJECTION INTRAMUSCULAR; INTRAVENOUS EVERY 30 MIN PRN
Status: DISCONTINUED | OUTPATIENT
Start: 2024-04-24 | End: 2024-04-25 | Stop reason: HOSPADM

## 2024-04-24 RX ORDER — OXYCODONE HYDROCHLORIDE 10 MG/1
10 TABLET ORAL
Status: DISCONTINUED | OUTPATIENT
Start: 2024-04-24 | End: 2024-04-25 | Stop reason: HOSPADM

## 2024-04-24 RX ORDER — PROPOFOL 10 MG/ML
INJECTION, EMULSION INTRAVENOUS PRN
Status: DISCONTINUED | OUTPATIENT
Start: 2024-04-24 | End: 2024-04-24

## 2024-04-24 RX ORDER — LIDOCAINE 40 MG/G
CREAM TOPICAL
Status: DISCONTINUED | OUTPATIENT
Start: 2024-04-24 | End: 2024-04-25 | Stop reason: HOSPADM

## 2024-04-24 RX ORDER — NALOXONE HYDROCHLORIDE 0.4 MG/ML
0.1 INJECTION, SOLUTION INTRAMUSCULAR; INTRAVENOUS; SUBCUTANEOUS
Status: DISCONTINUED | OUTPATIENT
Start: 2024-04-24 | End: 2024-04-25 | Stop reason: HOSPADM

## 2024-04-24 RX ORDER — SODIUM CHLORIDE, SODIUM LACTATE, POTASSIUM CHLORIDE, CALCIUM CHLORIDE 600; 310; 30; 20 MG/100ML; MG/100ML; MG/100ML; MG/100ML
INJECTION, SOLUTION INTRAVENOUS CONTINUOUS
Status: DISCONTINUED | OUTPATIENT
Start: 2024-04-24 | End: 2024-04-25 | Stop reason: HOSPADM

## 2024-04-24 RX ADMIN — SODIUM CHLORIDE, SODIUM LACTATE, POTASSIUM CHLORIDE, CALCIUM CHLORIDE: 600; 310; 30; 20 INJECTION, SOLUTION INTRAVENOUS at 10:56

## 2024-04-24 RX ADMIN — LIDOCAINE HYDROCHLORIDE 80 MG: 20 INJECTION, SOLUTION INFILTRATION; PERINEURAL at 11:24

## 2024-04-24 RX ADMIN — PROPOFOL 30 MG: 10 INJECTION, EMULSION INTRAVENOUS at 11:22

## 2024-04-24 RX ADMIN — PROPOFOL 250 MCG/KG/MIN: 10 INJECTION, EMULSION INTRAVENOUS at 11:22

## 2024-04-24 ASSESSMENT — LIFESTYLE VARIABLES: TOBACCO_USE: 1

## 2024-04-24 NOTE — DISCHARGE INSTRUCTIONS
If you have any questions or concerns regarding your procedure, please contact Dr. Mancera, his office number is [phone].     Upper GI Endoscopy: What to Expect at Home  Your Recovery  You had an upper GI endoscopy. Your doctor used a thin, lighted tube that bends to look at the inside of your esophagus, your stomach, and the first part of the small intestine, called the duodenum.  After you have an endoscopy, you will stay at the hospital or clinic for 1 to 2 hours. This will allow the medicine to wear off. You will be able to go home after your doctor or nurse checks to make sure that you're not having any problems.  You may have to stay overnight if you had treatment during the test. You may have a sore throat for a day or two after the test.  This care sheet gives you a general idea about what to expect after the test.  How can you care for yourself at home?  Activity   Rest as much as you need to after you go home.  You should be able to go back to your usual activities the day after the test.  Diet   Follow your doctor's directions for eating after the test.  Drink plenty of fluids (unless your doctor has told you not to).  Medications   If you have a sore throat the day after the test, use an over-the-counter spray to numb your throat.  Follow-up care is a key part of your treatment and safety. Be sure to make and go to all appointments, and call your doctor if you are having problems. It's also a good idea to know your test results and keep a list of the medicines you take.  When should you call for help?   Call 911 anytime you think you may need emergency care. For example, call if:    You passed out (lost consciousness).     You have trouble breathing.     You pass maroon or bloody stools.   Call your doctor now or seek immediate medical care if:    You have pain that does not get better after your take pain medicine.     You have new or worse belly pain.     You have blood in your stools.     You  "are sick to your stomach and cannot keep fluids down.     You have a fever.     You cannot pass stools or gas.   Watch closely for changes in your health, and be sure to contact your doctor if:    Your throat still hurts after a day or two.     You do not get better as expected.   Where can you learn more?  Go to https://www.[email or web address]/patiented  Enter J454 in the search box to learn more about \"Upper GI Endoscopy: What to Expect at Home.\"  Current as of: October 19, 2023               Content Version: 14.0    7734-6769 [email or web address].   Care instructions adapted under license by your healthcare professional. If you have questions about a medical condition or this instruction, always ask your healthcare professional. [email or web address] disclaims any warranty or liability for your use of this information.      Same-Day Surgery   Adult Discharge Orders & Instructions     For 24 hours after surgery    Get plenty of rest.  A responsible adult must stay with you for at least 24 hours after you leave the hospital.     Do not drive or use heavy equipment.  If you have weakness or tingling, don't drive or use heavy equipment until this feeling goes away.    Do not drink alcohol.    Avoid strenuous or risky activities.  Ask for help when climbing stairs.     You may feel lightheaded.  If so, sit for a few minutes before standing.  Have someone help you get up.     You may have a slight fever. Call the doctor if your fever is over 100 F (37.7 C) (taken under the tongue) or lasts longer than 24 hours.    You may have a dry mouth, a sore throat, muscle aches or trouble sleeping.  These should go away after 24 hours.    Do not make important or legal decisions.    Based on the surgery/procedure that you had today, we do not expect that you will have any problems.  However, we want you to know what to do if you have pain, nausea, bleeding, or infection:    To control pain:  Take medicines your physician has " prescribed or or over-the counter medicine he or she advises.  Ice packs and periods of rest are often helpful.  For surgery on an arm or leg, raise it on a pillow to ease swelling.  If your pain is not managed with the above methods, contact your physician.    Copyright Mendel Corey, Licensed under [employer]    To control nausea:  Take anti-nausea medicine approved by your physician.  Drink clear liquids such as apple juice, ginger ale, broth or 7-Up. Be sure to drink enough fluids.  Move to a regular diet as you feel able.  Rest may also help.    Bleeding:  You may see a little blood on your dressing, about the size of a quarter in the first 24 hours.  If you see this, there is no reason to be alarmed.  However, if this continues to increase in size, apply pressure if able, and notify your physician.    Copyright [employer], Licensed under [employer]    Infection: Please contact your physician if you have any of the following signs:  redness, swelling, heat, increasing pain or foul-smelling drainage at your surgery site, fever or chills.    Call your doctor for any of the followin.  It has been over 8 to 10 hours since surgery and you are still not able to urinate (pass water).    2.  Headache for over 24 hours.

## 2024-04-24 NOTE — ANESTHESIA CARE TRANSFER NOTE
Patient: Beh G Dwe    Procedure: Procedure(s):  ESOPHAGOGASTRODUODENOSCOPY, WITH BIOPSY       Diagnosis: Upper GI bleed [K92.2]  Peptic ulcer due to Helicobacter pylori [K27.9, B96.81]  Diagnosis Additional Information: No value filed.    Anesthesia Type:   MAC     Note:    Oropharynx: bite block in place until patient awake enough to open his mouth- RN aware.  Level of Consciousness: drowsy  Oxygen Supplementation: face mask  Level of Supplemental Oxygen (L/min / FiO2): 8  Independent Airway: airway patency satisfactory and stable  Dentition: dentition unchanged  Vital Signs Stable: post-procedure vital signs reviewed and stable  Report to RN Given: handoff report given  Patient transferred to: Phase II    Handoff Report: Identifed the Patient, Identified the Reponsible Provider, Reviewed the pertinent medical history, Discussed the surgical course, Reviewed Intra-OP anesthesia mangement and issues during anesthesia, Set expectations for post-procedure period and Allowed opportunity for questions and acknowledgement of understanding      Vitals:  Vitals Value Taken Time   /70    Temp 37C    Pulse 74    Resp 14    SpO2 99        Electronically Signed By: LISA Angel CRNA  April 24, 2024  11:45 AM

## 2024-04-24 NOTE — ANESTHESIA POSTPROCEDURE EVALUATION
Patient: Beh G Dwe    Procedure: Procedure(s):  ESOPHAGOGASTRODUODENOSCOPY, WITH BIOPSY       Anesthesia Type:  MAC    Note:  Disposition: Outpatient   Postop Pain Control: Uneventful            Sign Out: Well controlled pain   PONV: No   Neuro/Psych: Uneventful            Sign Out: Acceptable/Baseline neuro status   Airway/Respiratory: Uneventful            Sign Out: Acceptable/Baseline resp. status   CV/Hemodynamics: Uneventful            Sign Out: Acceptable CV status; No obvious hypovolemia; No obvious fluid overload   Other NRE: NONE   DID A NON-ROUTINE EVENT OCCUR? No           Last vitals:  Vitals Value Taken Time   /80 04/24/24 1200   Temp 97.3  F (36.3  C) 04/24/24 1144   Pulse 75 04/24/24 1212   Resp 18 04/24/24 1200   SpO2 97 % 04/24/24 1212   Vitals shown include unfiled device data.    Electronically Signed By: Raymundo Nguyen MD  April 24, 2024  12:18 PM

## 2024-04-24 NOTE — PROGRESS NOTES
Pt and family verbalize good understanding of discharge teach and follow up with MD.   VSS,Surgical incision CDI. D/C criteria met. Pt verbalizes readiness to go home.   Home with wife.   @[facility]@ 4/24/2024 12:22 PM

## 2024-04-24 NOTE — H&P
General Surgery H&P  Beh G Dwe MRN# [id number]   Age/Sex: 44 year old male YOB: 1980     Reason for visit: EGD       Referring physician: Dr. Wu                    Assessment and Plan:   Assessment:   PUD  GI bleed  H. Pylori infection    Plan:  - To the OR for EGD  - The risks and benefits of the procedure were explained detail to the patient. The risks include infection, bleeding, damage to the surrounding structures. Patient verbalized understanding provided consent to undergo the procedure above.    Addendum:  the notes from the primary and regions show ulcer at the GJ, but the op note shows GE.  Patient does not have a GJ anastomosis.           Chief Complaint:   Here for surgery     History is obtained from the patient    HPI:   Beh G Dwe is a 44 year old male who presents for EGD.  Pt had recent hx of ulcer at the GE causing GI bleed.  Pt has no new complaints today.           Past Medical History:     Past Medical History:   Diagnosis Date    Gastroesophageal reflux disease     Tuberculosis     latent TB.  completed a course of INR in 2010              Past Surgical History:     Past Surgical History:   Procedure Laterality Date    LUMBAR DISCECTOMY Left 2/17/2023    Procedure: left lumbar 4-lumbar 5 hemilaminectomy, medial facetectomy, foraminotomy, and microdiscectomy.;  Surgeon: Marlena Zuniga MD;  Location: [facility] Main OR             Social History:    reports that he has been smoking cigarettes. He has never been exposed to tobacco smoke. He has quit using smokeless tobacco.  His smokeless tobacco use included chew. He reports current alcohol use. He reports that he does not use drugs.           Family History:   History reviewed. No pertinent family history.           Allergies:   No Known Allergies           Medications:     Prior to Admission medications    Medication Sig Start Date End Date Taking? Authorizing Provider   acetaminophen (TYLENOL) 500 MG tablet Take 2 tablets  (1,000 mg) by mouth 3 times daily as needed for pain 1/24/24   Ho, Sonia León MD   atorvastatin (LIPITOR) 20 MG tablet Take 1 tablet (20 mg) by mouth at bedtime 1/24/24   Ho, Sonia León MD   cyclobenzaprine (FLEXERIL) 5 MG tablet Take 1 tablet (5 mg) by mouth nightly as needed for muscle spasms 1/24/24   Ho, Sonia León MD   gabapentin (NEURONTIN) 300 MG capsule Take 1 capsule (300 mg) by mouth 2 times daily 8/3/23   Ho, Sonia León MD   HEMORRHOIDAL 0.25-14-74.9 % rectal ointment PLACE RECTALLY 2 TIMES DAILY AS NEEDED FOR HEMORRHOIDS 4/9/24   Ho, Sonia León MD   pantoprazole (PROTONIX) 40 MG EC tablet Take 1 tablet (40 mg) by mouth daily 2/21/24   Ho, Sonia León MD              Review of Systems:   A 12 point Review of Systems is negative other than noted in the HPI            Physical Exam:   No data found.     No intake or output data in the 24 hours ending 04/24/24 0806   Constitutional:   awake, alert, cooperative, no apparent distress, and appears stated age       Eyes:   PERRL, conjunctiva/corneas clear, EOM's intact; no scleral edema or icterus noted        ENT:   Normocephalic, without obvious abnormality, atraumatic, Lips, mucosa, and tongue normal        Hematologic / Lymphatic:   No lymphadenopathy       Lungs:   Normal respiratory effort, no accessory muscle use       Cardiovascular:   Regular rate and rhythm       Abdomen:   Soft, nondistended, nontender to palpation       Musculoskeletal:   No obvious swelling, bruising or deformity       Skin:   Skin color and texture normal for patient, no rashes or lesions              Data:            Monroe Mancera DO  General Surgeon  [facility]  Surgery [address] [address]?  Office: [phone]  Employed by - [facility]  Pager: [phone]

## 2024-04-24 NOTE — ANESTHESIA PREPROCEDURE EVALUATION
Anesthesia Pre-Procedure Evaluation    Patient: Beh G Dwe   MRN: [id number] : 1980        Procedure : Procedure(s):  ESOPHAGOGASTRODUODENOSCOPY, WITH BIOPSY          Past Medical History:   Diagnosis Date    Gastroesophageal reflux disease     Tuberculosis     latent TB.  completed a course of INR in       Past Surgical History:   Procedure Laterality Date    LUMBAR DISCECTOMY Left 2023    Procedure: left lumbar 4-lumbar 5 hemilaminectomy, medial facetectomy, foraminotomy, and microdiscectomy.;  Surgeon: Marlena Zuniga MD;  Location: [facility] Main OR      No Known Allergies   Social History     Tobacco Use    Smoking status: Every Day     Types: Cigarettes     Passive exposure: Never    Smokeless tobacco: Former     Types: Chew    Tobacco comments:     chews betel nut w/o tobacco, trying to quit     Patient rolls his own cigarettes    Substance Use Topics    Alcohol use: Yes     Comment: Alcoholic Drinks/day: sometimes      Wt Readings from Last 1 Encounters:   24 72.6 kg (160 lb)        Anesthesia Evaluation   Pt has had prior anesthetic.     No history of anesthetic complications       ROS/MED HX  ENT/Pulmonary:  - neg pulmonary ROS   (+)                tobacco use,                        Neurologic:  - neg neurologic ROS     Cardiovascular:  - neg cardiovascular ROS     METS/Exercise Tolerance: >4 METS    Hematologic:  - neg hematologic  ROS     Musculoskeletal:  - neg musculoskeletal ROS     GI/Hepatic: Comment: Recent upper GI bleed - neg GI/hepatic ROS   (+) GERD,                   Renal/Genitourinary:  - neg Renal ROS     Endo:  - neg endo ROS     Psychiatric/Substance Use:  - neg psychiatric ROS     Infectious Disease:  - neg infectious disease ROS   (+)      TB (s/p complete treatment),      Malignancy:  - neg malignancy ROS     Other:  - neg other ROS    (+)  , H/O Chronic Pain (back),         Physical Exam    Airway        Mallampati: II   TM distance: > 3 FB   Neck ROM:  "full   Mouth opening: > 3 cm    Respiratory Devices and Support         Dental       (+) Minor Abnormalities - some fillings, tiny chips      Cardiovascular   cardiovascular exam normal          Pulmonary   pulmonary exam normal                OUTSIDE LABS:  CBC:   Lab Results   Component Value Date    WBC 9.5 02/21/2024    WBC 8.4 01/26/2023    HGB 14.2 02/21/2024    HGB 15.1 01/26/2023    HCT 43.1 02/21/2024    HCT 46.1 01/26/2023     02/21/2024     01/26/2023     BMP:   Lab Results   Component Value Date     01/26/2023     12/07/2022    POTASSIUM 3.8 01/26/2023    POTASSIUM 3.6 12/07/2022    CHLORIDE 105 01/26/2023    CHLORIDE 104 12/07/2022    CO2 28 01/26/2023    CO2 24 12/07/2022    BUN 10.3 01/26/2023    BUN 13.4 12/07/2022    CR 0.90 01/26/2023    CR 0.98 12/07/2022    GLC 78 01/26/2023     (H) 12/07/2022     COAGS:   Lab Results   Component Value Date    PTT 26 01/26/2023    INR 0.92 01/26/2023     POC: No results found for: \"BGM\", \"HCG\", \"HCGS\"  HEPATIC:   Lab Results   Component Value Date    ALBUMIN 4.4 12/07/2022    PROTTOTAL 7.2 12/07/2022    ALT 35 12/07/2022    AST 28 12/07/2022    ALKPHOS 50 12/07/2022    BILITOTAL 0.3 12/07/2022     OTHER:   Lab Results   Component Value Date    DECLAN 9.8 01/26/2023    TSH 0.50 12/07/2022       Anesthesia Plan    ASA Status:  2    NPO Status:  NPO Appropriate    Anesthesia Type: MAC.     - Reason for MAC: immobility needed, straight local not clinically adequate   Induction: Propofol.   Maintenance: TIVA.        Consents    Anesthesia Plan(s) and associated risks, benefits, and realistic alternatives discussed. Questions answered and patient/representative(s) expressed understanding.     - Discussed:     - Discussed with:  Patient            Postoperative Care    Pain management: Multi-modal analgesia.   PONV prophylaxis: Ondansetron (or other 5HT-3), Dexamethasone or Solumedrol     Comments:    Other Comments: propofol    Reviewed " "anesthetic options and risks. Patient agrees to proceed.            Raymundo Nguyen MD    I have reviewed the pertinent notes and labs in the chart from the past 30 days and (re)examined the patient.  Any updates or changes from those notes are reflected in this note.              # Overweight: Estimated body mass index is 25.86 kg/m  as calculated from the following:    Height as of this encounter: 1.675 m (5' 5.95\").    Weight as of this encounter: 72.6 kg (160 lb).      "

## 2024-05-01 ENCOUNTER — TELEPHONE (OUTPATIENT)
Dept: SURGERY | Facility: CLINIC | Age: 44
End: 2024-05-01
Payer: COMMERCIAL

## 2024-05-01 NOTE — TELEPHONE ENCOUNTER
RN called patient with Chelo  to relay provider message below regarding EGD results. Patient verbalized good understanding. No further questions or concerns.    Mariana VOGT RN, BSN        ----- Message from Monroe Mancera DO sent at 4/30/2024  1:42 PM CDT -----  Regarding: egd results  Patient will require a Chelo .  Please let the patient know that the EGD results did not show any significant abnormalities.  He just has some mild irritation.  Patient is to avoid any oral intake of medicines or spicy foods that can irritate his stomach further.  Patient can follow-up as needed in the future with general surgery team.  No further recommendations.    Monroe Mancera DO  General Surgeon  [facility]  Surgery Clinic - [address] [address]?  Office: [phone]  Employed by - [facility]  Pager: [phone]

## 2024-05-22 ENCOUNTER — VIRTUAL VISIT (OUTPATIENT)
Dept: INTERPRETER SERVICES | Facility: CLINIC | Age: 44
End: 2024-05-22
Payer: COMMERCIAL

## 2024-05-22 ENCOUNTER — OFFICE VISIT (OUTPATIENT)
Dept: FAMILY MEDICINE | Facility: CLINIC | Age: 44
End: 2024-05-22
Payer: COMMERCIAL

## 2024-05-22 VITALS
SYSTOLIC BLOOD PRESSURE: 124 MMHG | HEART RATE: 88 BPM | DIASTOLIC BLOOD PRESSURE: 84 MMHG | TEMPERATURE: 98.3 F | OXYGEN SATURATION: 97 % | BODY MASS INDEX: 26.68 KG/M2 | WEIGHT: 160.12 LBS | RESPIRATION RATE: 16 BRPM | HEIGHT: 65 IN

## 2024-05-22 DIAGNOSIS — M54.16 LUMBAR RADICULOPATHY: ICD-10-CM

## 2024-05-22 DIAGNOSIS — G89.29 CHRONIC LEFT-SIDED LOW BACK PAIN WITH LEFT-SIDED SCIATICA: ICD-10-CM

## 2024-05-22 DIAGNOSIS — M51.369 DDD (DEGENERATIVE DISC DISEASE), LUMBAR: ICD-10-CM

## 2024-05-22 DIAGNOSIS — Z86.19 HISTORY OF HELICOBACTER PYLORI INFECTION: ICD-10-CM

## 2024-05-22 DIAGNOSIS — M54.42 CHRONIC LEFT-SIDED LOW BACK PAIN WITH LEFT-SIDED SCIATICA: ICD-10-CM

## 2024-05-22 DIAGNOSIS — M47.816 LUMBAR FACET ARTHROPATHY: ICD-10-CM

## 2024-05-22 DIAGNOSIS — Z71.85 VACCINE COUNSELING: ICD-10-CM

## 2024-05-22 DIAGNOSIS — L72.9 CYST OF SUBCUTANEOUS TISSUE: ICD-10-CM

## 2024-05-22 DIAGNOSIS — K21.9 GASTROESOPHAGEAL REFLUX DISEASE WITHOUT ESOPHAGITIS: ICD-10-CM

## 2024-05-22 DIAGNOSIS — K92.2 UPPER GI BLEED: Primary | ICD-10-CM

## 2024-05-22 PROCEDURE — 90471 IMMUNIZATION ADMIN: CPT | Performed by: FAMILY MEDICINE

## 2024-05-22 PROCEDURE — 99214 OFFICE O/P EST MOD 30 MIN: CPT | Mod: 25 | Performed by: FAMILY MEDICINE

## 2024-05-22 PROCEDURE — 90677 PCV20 VACCINE IM: CPT | Performed by: FAMILY MEDICINE

## 2024-05-22 PROCEDURE — T1013 SIGN LANG/ORAL INTERPRETER: HCPCS | Mod: U4 | Performed by: INTERPRETER

## 2024-05-22 RX ORDER — CYCLOBENZAPRINE HCL 5 MG
5 TABLET ORAL
Qty: 30 TABLET | Refills: 4 | Status: SHIPPED | OUTPATIENT
Start: 2024-05-22

## 2024-05-22 RX ORDER — GABAPENTIN 300 MG/1
300 CAPSULE ORAL 2 TIMES DAILY
Qty: 120 CAPSULE | Refills: 3 | Status: SHIPPED | OUTPATIENT
Start: 2024-05-22

## 2024-05-22 RX ORDER — ACETAMINOPHEN 500 MG
1000 TABLET ORAL 3 TIMES DAILY PRN
Qty: 100 TABLET | Refills: 4 | Status: SHIPPED | OUTPATIENT
Start: 2024-05-22

## 2024-05-22 RX ORDER — FAMOTIDINE 20 MG/1
20 TABLET, FILM COATED ORAL 2 TIMES DAILY
Qty: 120 TABLET | Refills: 3 | Status: SHIPPED | OUTPATIENT
Start: 2024-05-22

## 2024-05-22 NOTE — PROGRESS NOTES
Assessment & Plan     History of Upper GI bleed, resolved  History of Helicobacter pylori infection, resolved  Completed H pylori treatment, EGD with surgery in April 2024 showed resolution of gastric ulcer and biopsies negative for H pylori.   Patient is to avoid any oral intake of medicines or spicy foods that can irritate his stomach further. Patient can follow-up as needed in the future with general surgery team.       Gastroesophageal reflux disease without esophagitis  Agreed to trial famotidine, if symptoms recur can switch back to PPI.   - famotidine (PEPCID) 20 MG tablet  Dispense: 120 tablet; Refill: 3      Chronic left-sided low back pain with left-sided sciatica  DDD (degenerative disc disease), lumbar  Lumbar facet arthropathy  Lumbar radiculopathy  Continue conservative management, defers injections for now.   - gabapentin (NEURONTIN) 300 MG capsule  Dispense: 120 capsule; Refill: 3  - cyclobenzaprine (FLEXERIL) 5 MG tablet  Dispense: 30 tablet; Refill: 4  - acetaminophen (TYLENOL) 500 MG tablet  Dispense: 100 tablet; Refill: 4  - counseled on option to connect with  if he would like support to pursue other employment options that do not require heavy lifting.       Cyst of subcutaneous tissue  On LUQ abdominal wall, exam is reassuring that this is benign.   - continue to monitor      Vaccine counseling  Prefers only one poke per visit thus defers COVID until next time.  - Pneumococcal 20 Valent Conjugate (Prevnar 20)      Nicotine/Tobacco Cessation  He reports that he has been smoking cigarettes. He has never been exposed to tobacco smoke. He has quit using smokeless tobacco.  His smokeless tobacco use included chew.  Nicotine/Tobacco Cessation Plan  Information offered: Patient not interested at this time      Return in about 6 months (around 11/22/2024) for Routine preventive, Medication Check, abdominal pain.    The longitudinal plan of care for the diagnosis(es)/condition(s) as  documented were addressed during this visit. Due to the added complexity in care, I will continue to support Beh in the subsequent management and with ongoing continuity of care.    Subjective   Beh is a 44 year old, presenting for the following health issues:  Abdominal Pain and Back Pain    History of Present Illness       Back Pain:  He presents for follow up of back pain. Patient's back pain is a chronic problem.  Location of back pain:  Right lower back, left lower back, right middle of back and left middle of back  Description of back pain: cramping, dull ache and fullness  Back pain spreads: left buttocks, left thigh, left knee and left foot    Since patient first noticed back pain, pain is: always present, but gets better and worse  Does back pain interfere with his job:  Yes       Reason for visit:  Back and abdomen pain        Back pain  - About the same as before, continues to do exercises twice a day and take medications, help some. Does have trouble sleeping sometimes due to pain.  - Not back to work as he can't lift heavy things, right now taking care of kids at home while his wife works. He does plan to eventually return to work but currently no openings and can't lift. Used to work transporting/driving older people, drop off/ from adult .   - Still taking gabapentin and cyclobenzaprine for pain. Tylenol not helping, not strong enough so he doesn't take it.  - Had option to get an injection, but has decided not to get it yet. Feels the oral meds are working enough. He's also had injection in past and it only helped for a while, not much better than oral meds.     History upper GI bleed  History of peptic ulcer due to Helicobacter pylori  - Since repeat EGD 4/24, abdominal pain doing much better. No abdominal pain, burning, nausea/vomiting, bowel movements are normal without black or bloody stools.   - Stopped eating spicy, fatty, greasy foods.  - Was taking pantoprazole and that helped,  "has been out for a couple months. But sometimes he borrows some from his wife who is prescribed the same med - just takes even though he doesn't have symptoms. Worries if he doesn't take it then symptoms of nausea will return after eating.     Lump  On abdomen, small lump under skin that he noticed a couple months ago. Doesn't bother him unless he is pressing on it, just wants to make sure it's okay. No history of abdominal surgery / incisions into the abdomen    Urinary frequency  Resolved since last visit. No pain with urination.          Objective    /84   Pulse 88   Temp 98.3  F (36.8  C) (Oral)   Resp 16   Ht 1.661 m (5' 5.39\")   Wt 72.6 kg (160 lb 1.9 oz)   SpO2 97%   BMI 26.33 kg/m    Body mass index is 26.33 kg/m .  Physical Exam   GENERAL: alert and no distress  NECK: no adenopathy, no asymmetry, masses, or scars  RESP: lungs clear to auscultation - no rales, rhonchi or wheezes  CV: regular rate and rhythm, normal S1 S2, no S3 or S4, no murmur, click or rub, no peripheral edema  ABDOMEN: soft, non-tender. ~1 cm nodule palpated just under skin, rubbery, smooth borders, non-tender to palpation.  No hepatosplenomegaly, no hernia. Bowel sounds normal  MS: no gross musculoskeletal defects noted, no edema    No results found for any visits on 05/22/24.  No results found for this or any previous visit (from the past 24 hour(s)).   Prior to immunization administration, verified patients identity using patient s name and date of birth. Please see Immunization Activity for additional information.     Screening Questionnaire for Adult Immunization    Are you sick today?   No   Do you have allergies to medications, food, a vaccine component or latex?   No   Have you ever had a serious reaction after receiving a vaccination?   No   Do you have a long-term health problem with heart, lung, kidney, or metabolic disease (e.g., diabetes), asthma, a blood disorder, no spleen, complement component deficiency, a " cochlear implant, or a spinal fluid leak?  Are you on long-term aspirin therapy?   No   Do you have cancer, leukemia, HIV/AIDS, or any other immune system problem?   No   Do you have a parent, brother, or sister with an immune system problem?   No   In the past 3 months, have you taken medications that affect  your immune system, such as prednisone, other steroids, or anticancer drugs; drugs for the treatment of rheumatoid arthritis, Crohn s disease, or psoriasis; or have you had radiation treatments?   No   Have you had a seizure, or a brain or other nervous system problem?   No   During the past year, have you received a transfusion of blood or blood    products, or been given immune (gamma) globulin or antiviral drug?   No   For women: Are you pregnant or is there a chance you could become       pregnant during the next month?   No   Have you received any vaccinations in the past 4 weeks?   No     Immunization questionnaire answers were all negative.      Patient instructed to remain in clinic for 15 minutes afterwards, and to report any adverse reactions.     Screening performed by Ras Alston MA on 5/22/2024 at 11:11 AM.         Signed Electronically by: Sonia Wu MD

## 2024-05-23 NOTE — PROGRESS NOTES
FOLLOW UP VISIT NOTE      HISTORY OF PRESENT ILLNESS    Beh was seen in follow up after previous visit for recheck ears.   He has aching pain inside the left ear. Painful to the touch.  He had bilateral tubes placed about 3 year ago, but have fallen out since then.  Denies jaw pain, sometimes clenches/grinds teeth. Doesn't chew gum.  No hearing concerns today.   Does not have allergy symptoms.  He is not currently working     My previous note:    Encounter Diagnoses   Name Primary?    OWEN (middle ear effusion), right Yes    Conductive hearing loss, bilateral      Dysfunction of both eustachian tubes         REVIEW OF SYSTEMS    Review of Systems: a 10-system review is reviewed at this encounter.  See scanned document.       No Known Allergies        PHYSICAL EXAM:        HEAD: Normal appearance and symmetry:  No cutaneous lesions.      EARS:        RIGHT: monomeric portion in the center, slight retraction   LEFT:    monomeric portion in the center    NOSE:  Septum: Deviation to the left  Turbinates: 2+  Dorsum:   straight       ORAL CAVITY/OROPHARYNX:    Lips:  Normal.     NECK:  Trachea:  midline     NEURO:   Alert and Oriented    GAIT AND STATION:  normal     RESPIRATORY:   Symmetry and Respiratory effort    PSYCH:   normal mood and affect    SKIN:  warm and dry         IMPRESSION:   Encounter Diagnosis   Name Primary?    Otalgia, left Yes            RECOMMENDATIONS:  -massage to jaw, warm compress, soft foods  -follow up in 1 year with audiogram    
12

## 2024-05-24 ENCOUNTER — OFFICE VISIT (OUTPATIENT)
Dept: OTOLARYNGOLOGY | Facility: CLINIC | Age: 44
End: 2024-05-24
Payer: COMMERCIAL

## 2024-05-24 ENCOUNTER — OFFICE VISIT (OUTPATIENT)
Dept: AUDIOLOGY | Facility: CLINIC | Age: 44
End: 2024-05-24
Payer: COMMERCIAL

## 2024-05-24 DIAGNOSIS — H92.02 OTALGIA, LEFT: Primary | ICD-10-CM

## 2024-05-24 DIAGNOSIS — H90.12 CONDUCTIVE HEARING LOSS OF LEFT EAR WITH UNRESTRICTED HEARING OF RIGHT EAR: Primary | ICD-10-CM

## 2024-05-24 PROCEDURE — 92557 COMPREHENSIVE HEARING TEST: CPT | Performed by: AUDIOLOGIST

## 2024-05-24 PROCEDURE — 99213 OFFICE O/P EST LOW 20 MIN: CPT | Performed by: OTOLARYNGOLOGY

## 2024-05-24 PROCEDURE — 92567 TYMPANOMETRY: CPT | Performed by: AUDIOLOGIST

## 2024-05-24 NOTE — PATIENT INSTRUCTIONS
-no infection seen in your ear today  -massage to jaw, warm compress, soft foods  -follow up in 1 year with audiogram

## 2024-05-24 NOTE — PROGRESS NOTES
AUDIOLOGY REPORT     SUMMARY: Audiology visit completed. See audiogram for results.       RECOMMENDATIONS: Follow-up with ENT.    Faraz Reeder, CCC-A  [place] Licensed Audiologist #[id number]

## 2024-05-24 NOTE — LETTER
5/24/2024         RE: Beh G Dwe  [address] [place] [address]        Dear Colleague,    Thank you for referring your patient, Beh G Dwe, to the [facility]. Please see a copy of my visit note below.    FOLLOW UP VISIT NOTE      HISTORY OF PRESENT ILLNESS    Beh was seen in follow up after previous visit for recheck ears.   He has aching pain inside the left ear. Painful to the touch.  He had bilateral tubes placed about 3 year ago, but have fallen out since then.  Denies jaw pain, sometimes clenches/grinds teeth. Doesn't chew gum.  No hearing concerns today.   Does not have allergy symptoms.  He is not currently working     My previous note:    Encounter Diagnoses   Name Primary?     OWEN (middle ear effusion), right Yes     Conductive hearing loss, bilateral       Dysfunction of both eustachian tubes         REVIEW OF SYSTEMS    Review of Systems: a 10-system review is reviewed at this encounter.  See scanned document.       No Known Allergies        PHYSICAL EXAM:        HEAD: Normal appearance and symmetry:  No cutaneous lesions.      EARS:        RIGHT: monomeric portion in the center, slight retraction   LEFT:    monomeric portion in the center    NOSE:  Septum: Deviation to the left  Turbinates: 2+  Dorsum:   straight       ORAL CAVITY/OROPHARYNX:    Lips:  Normal.     NECK:  Trachea:  midline     NEURO:   Alert and Oriented    GAIT AND STATION:  normal     RESPIRATORY:   Symmetry and Respiratory effort    PSYCH:   normal mood and affect    SKIN:  warm and dry         IMPRESSION:   Encounter Diagnosis   Name Primary?     Otalgia, left Yes            RECOMMENDATIONS:  -massage to jaw, warm compress, soft foods  -follow up in 1 year with audiogram      Again, thank you for allowing me to participate in the care of your patient.        Sincerely,        Samir Lovell MD

## 2024-10-26 ENCOUNTER — IMMUNIZATION (OUTPATIENT)
Dept: FAMILY MEDICINE | Facility: CLINIC | Age: 44
End: 2024-10-26
Payer: COMMERCIAL

## 2024-10-26 VITALS — TEMPERATURE: 97.9 F

## 2024-10-26 PROCEDURE — 90471 IMMUNIZATION ADMIN: CPT

## 2024-10-26 PROCEDURE — 90656 IIV3 VACC NO PRSV 0.5 ML IM: CPT

## 2025-02-24 ENCOUNTER — PATIENT OUTREACH (OUTPATIENT)
Dept: CARE COORDINATION | Facility: CLINIC | Age: 45
End: 2025-02-24
Payer: COMMERCIAL

## 2025-07-09 ENCOUNTER — OFFICE VISIT (OUTPATIENT)
Dept: FAMILY MEDICINE | Facility: CLINIC | Age: 45
End: 2025-07-09
Payer: COMMERCIAL

## 2025-07-09 VITALS
HEIGHT: 66 IN | OXYGEN SATURATION: 98 % | SYSTOLIC BLOOD PRESSURE: 127 MMHG | BODY MASS INDEX: 27 KG/M2 | DIASTOLIC BLOOD PRESSURE: 83 MMHG | HEART RATE: 73 BPM | WEIGHT: 168 LBS | TEMPERATURE: 98.4 F | RESPIRATION RATE: 16 BRPM

## 2025-07-09 DIAGNOSIS — M54.16 LUMBAR RADICULOPATHY: ICD-10-CM

## 2025-07-09 DIAGNOSIS — Z12.11 SCREEN FOR COLON CANCER: ICD-10-CM

## 2025-07-09 DIAGNOSIS — M51.362 DEGENERATION OF INTERVERTEBRAL DISC OF LUMBAR REGION WITH DISCOGENIC BACK PAIN AND LOWER EXTREMITY PAIN: ICD-10-CM

## 2025-07-09 DIAGNOSIS — M51.369 DEGENERATION OF INTERVERTEBRAL DISC OF LUMBAR REGION WITHOUT DISCOGENIC BACK PAIN OR LOWER EXTREMITY PAIN: ICD-10-CM

## 2025-07-09 DIAGNOSIS — E78.5 HYPERLIPIDEMIA LDL GOAL <130: ICD-10-CM

## 2025-07-09 DIAGNOSIS — K59.01 SLOW TRANSIT CONSTIPATION: ICD-10-CM

## 2025-07-09 DIAGNOSIS — G89.29 CHRONIC LEFT-SIDED LOW BACK PAIN WITH LEFT-SIDED SCIATICA: ICD-10-CM

## 2025-07-09 DIAGNOSIS — M47.816 LUMBAR FACET ARTHROPATHY: ICD-10-CM

## 2025-07-09 DIAGNOSIS — K21.9 GASTROESOPHAGEAL REFLUX DISEASE WITHOUT ESOPHAGITIS: ICD-10-CM

## 2025-07-09 DIAGNOSIS — K29.30 CHRONIC SUPERFICIAL GASTRITIS WITHOUT BLEEDING: ICD-10-CM

## 2025-07-09 DIAGNOSIS — M54.42 CHRONIC LEFT-SIDED LOW BACK PAIN WITH LEFT-SIDED SCIATICA: ICD-10-CM

## 2025-07-09 DIAGNOSIS — K27.9 PEPTIC ULCER DISEASE: ICD-10-CM

## 2025-07-09 DIAGNOSIS — Z00.00 ROUTINE GENERAL MEDICAL EXAMINATION AT A HEALTH CARE FACILITY: Primary | ICD-10-CM

## 2025-07-09 PROBLEM — K92.2 UPPER GI BLEED: Status: RESOLVED | Noted: 2024-01-13 | Resolved: 2025-07-09

## 2025-07-09 LAB
ERYTHROCYTE [DISTWIDTH] IN BLOOD BY AUTOMATED COUNT: 13.1 % (ref 10–15)
HCT VFR BLD AUTO: 45.2 % (ref 40–53)
HGB BLD-MCNC: 15.3 G/DL (ref 13.3–17.7)
MCH RBC QN AUTO: 30 PG (ref 26.5–33)
MCHC RBC AUTO-ENTMCNC: 33.8 G/DL (ref 31.5–36.5)
MCV RBC AUTO: 89 FL (ref 78–100)
PLATELET # BLD AUTO: 182 10E3/UL (ref 150–450)
RBC # BLD AUTO: 5.1 10E6/UL (ref 4.4–5.9)
WBC # BLD AUTO: 5.2 10E3/UL (ref 4–11)

## 2025-07-09 PROCEDURE — 3079F DIAST BP 80-89 MM HG: CPT | Performed by: FAMILY MEDICINE

## 2025-07-09 PROCEDURE — 80061 LIPID PANEL: CPT | Performed by: FAMILY MEDICINE

## 2025-07-09 PROCEDURE — 3050F LDL-C >= 130 MG/DL: CPT | Performed by: FAMILY MEDICINE

## 2025-07-09 PROCEDURE — 99214 OFFICE O/P EST MOD 30 MIN: CPT | Mod: 25 | Performed by: FAMILY MEDICINE

## 2025-07-09 PROCEDURE — 99396 PREV VISIT EST AGE 40-64: CPT | Performed by: FAMILY MEDICINE

## 2025-07-09 PROCEDURE — 85027 COMPLETE CBC AUTOMATED: CPT | Performed by: FAMILY MEDICINE

## 2025-07-09 PROCEDURE — 80053 COMPREHEN METABOLIC PANEL: CPT | Performed by: FAMILY MEDICINE

## 2025-07-09 PROCEDURE — 3074F SYST BP LT 130 MM HG: CPT | Performed by: FAMILY MEDICINE

## 2025-07-09 PROCEDURE — T1013 SIGN LANG/ORAL INTERPRETER: HCPCS

## 2025-07-09 PROCEDURE — 36415 COLL VENOUS BLD VENIPUNCTURE: CPT | Performed by: FAMILY MEDICINE

## 2025-07-09 RX ORDER — CYCLOBENZAPRINE HCL 5 MG
5 TABLET ORAL
Qty: 30 TABLET | Refills: 4 | Status: SHIPPED | OUTPATIENT
Start: 2025-07-09

## 2025-07-09 RX ORDER — FAMOTIDINE 20 MG/1
20 TABLET, FILM COATED ORAL 2 TIMES DAILY
Qty: 120 TABLET | Refills: 3 | Status: SHIPPED | OUTPATIENT
Start: 2025-07-09

## 2025-07-09 RX ORDER — PANTOPRAZOLE SODIUM 40 MG/1
40 TABLET, DELAYED RELEASE ORAL DAILY
Qty: 90 TABLET | Refills: 3 | Status: SHIPPED | OUTPATIENT
Start: 2025-07-09

## 2025-07-09 RX ORDER — GABAPENTIN 300 MG/1
300 CAPSULE ORAL 2 TIMES DAILY
Qty: 120 CAPSULE | Refills: 3 | Status: SHIPPED | OUTPATIENT
Start: 2025-07-09

## 2025-07-09 RX ORDER — ATORVASTATIN CALCIUM 20 MG/1
20 TABLET, FILM COATED ORAL AT BEDTIME
Qty: 90 TABLET | Refills: 3 | Status: SHIPPED | OUTPATIENT
Start: 2025-07-09

## 2025-07-09 RX ORDER — POLYETHYLENE GLYCOL 3350 17 G/17G
1 POWDER, FOR SOLUTION ORAL DAILY
Qty: 850 G | Refills: 5 | Status: SHIPPED | OUTPATIENT
Start: 2025-07-09

## 2025-07-09 NOTE — LETTER
2025    Beh G Dwe   1980        To Whom it May Concern;    Please excuse Beh G Dwe from work/school for a healthcare visit on 2025.     Sincerely,        Sonia Wu MD

## 2025-07-09 NOTE — PROGRESS NOTES
"Preventive Care Visit  [facility] MD, Family Medicine  Jul 9, 2025  {Provider  Link to [facility] :[id number]}    Assessment & Plan     Routine general medical examination at a health care facility  ***    Screen for colon cancer  ***    Chronic left-sided low back pain with left-sided sciatica  ***    Degeneration of intervertebral disc of lumbar region with discogenic back pain and lower extremity pain  ***    Lumbar facet arthropathy  ***    Superficial fingertip lesion on left abdomen by umbilicus. Get US    Nicotine/Tobacco Cessation  He reports that he has been smoking cigarettes. He has never been exposed to tobacco smoke. He has quit using smokeless tobacco.  His smokeless tobacco use included chew.  Nicotine/Tobacco Cessation Plan  Information offered: Patient not interested at this time      BMI  Estimated body mass index is 27.32 kg/m  as calculated from the following:    Height as of this encounter: 1.67 m (5' 5.75\").    Weight as of this encounter: 76.2 kg (168 lb).   Weight management plan: Discussed healthy diet and exercise guidelines    Counseling  Appropriate preventive services were addressed with this patient via screening, questionnaire, or discussion as appropriate for fall prevention, nutrition, physical activity, Tobacco-use cessation, social engagement, weight loss and cognition.  Checklist reviewing preventive services available has been given to the patient.  Reviewed patient's diet, addressing concerns and/or questions.   Reviewed preventive health counseling, as reflected in patient instructions    Follow-up  No follow-ups on file.    Subjective Beh is a 45 year old, presenting for the following:  Physical        7/9/2025     3:14 PM   Additional Questions   Roomed by Debbie HELTON       Patient says he has been sick for a few days and would like to be evaluated for that. He has been having fever/chills, nausea/vomiting, body aches. No sick " contacts.     Advance Care Planning  {The storyboard will display whether the patient has ACP docs on file. Hover over the Code section in the storyboard to access the ACP documents. :[id number]}  Discussed advance care planning with patient; however, patient declined at this time.        7/9/2025   General Health   How would you rate your overall physical health? (!) POOR         7/9/2025   Nutrition   Three or more servings of calcium each day? Yes   Diet: Regular (no restrictions)   How many servings of fruit and vegetables per day? (!) I DON'T KNOW   How many sweetened beverages each day? (!) I DON'T KNOW         5/1/2023   Exercise   Frequency of exercise: None         7/9/2025   Social Factors   Worry food won't last until get money to buy more No   Food not last or not have enough money for food? No   Do you have housing? (Housing is defined as stable permanent housing and does not include staying outside in a car, in a tent, in an abandoned building, in an overnight shelter, or couch-surfing.) Yes   Are you worried about losing your housing? No   Lack of transportation? No   Unable to get utilities (heat,electricity)? No         7/9/2025   Dental   Dentist two times every year? Yes         Today's PHQ-2 Score:       7/9/2025     3:20 PM   PHQ-2 ( 1999 [employer])   Q1: Little interest or pleasure in doing things 0   Q2: Feeling down, depressed or hopeless 0   PHQ-2 Score 0    Q1: Little interest or pleasure in doing things Not at all   Q2: Feeling down, depressed or hopeless Not at all   PHQ-2 Score 0       Patient-reported           7/9/2025   Substance Use   Alcohol more than 3/day or more than 7/wk Not Applicable   Do you use any other substances recreationally? No     Social History     Tobacco Use    Smoking status: Every Day     Types: Cigarettes     Passive exposure: Never    Smokeless tobacco: Former     Types: Chew    Tobacco comments:     chews betel nut w/o tobacco, trying to quit     Patient rolls his  "own cigarettes    Vaping Use    Vaping status: Never Used   Substance Use Topics    Alcohol use: Yes     Comment: Alcoholic Drinks/day: sometimes    Drug use: Never     {Provider  If there are gaps in the social history shown above, please follow the link to update and then refresh the note Link to Social and Substance History :[id number]}      7/9/2025   STI Screening   New sexual partner(s) since last STI/HIV test? No   ASCVD Risk   The 10-year ASCVD risk score (Tristan FLORES, et al., 2019) is: 7%    Values used to calculate the score:      Age: 45 years      Sex: Male      Is Non- : No      Diabetic: No      Tobacco smoker: Yes      Systolic Blood Pressure: 127 mmHg      Is BP treated: No      HDL Cholesterol: 40 mg/dL      Total Cholesterol: 199 mg/dL        7/9/2025   Contraception/Family Planning   Questions about contraception or family planning No     {Provider  REQUIRED FOR AWV Use the storyboard to review patient history, after sections have been marked as reviewed, refresh note to capture documentation:[id number]}   Reviewed and updated as needed this visit by Provider                             Objective    Exam  /83   Pulse 73   Temp 98.4  F (36.9  C) (Oral)   Resp 16   Ht 1.67 m (5' 5.75\")   Wt 76.2 kg (168 lb)   SpO2 98%   BMI 27.32 kg/m     Estimated body mass index is 27.32 kg/m  as calculated from the following:    Height as of this encounter: 1.67 m (5' 5.75\").    Weight as of this encounter: 76.2 kg (168 lb).    Physical Exam  GENERAL: alert and no distress  NECK: no adenopathy, no asymmetry, masses, or scars  RESP: lungs clear to auscultation - no rales, rhonchi or wheezes  CV: regular rate and rhythm, normal S1 S2, no S3 or S4, no murmur, click or rub, no peripheral edema  ABDOMEN: soft, nontender, no hepatosplenomegaly, no masses and bowel sounds normal  MS: no gross musculoskeletal defects noted, no edema        Signed Electronically by: Sonia Wu, " MD  {Email feedback regarding this note to [email or web address]   :[id number]}

## 2025-07-09 NOTE — PATIENT INSTRUCTIONS
Patient Education   You have been provided the Preventive Care Advice document.    Additional copies can be found here: www.[email or web address]  Preventive Care Advice   This is general advice given by our system to help you stay healthy. However, your care team may have specific advice just for you. Please talk to your care team about your preventive care needs.  Nutrition  Eat 5 or more servings of fruits and vegetables each day.  Try wheat bread, brown rice and whole grain pasta (instead of white bread, rice, and pasta).  Get enough calcium and vitamin D. Check the label on foods and aim for 100% of the RDA (recommended daily allowance).  Lifestyle  Exercise at least 150 minutes each week  (30 minutes a day, 5 days a week).  Do muscle strengthening activities 2 days a week. These help control your weight and prevent disease.  No smoking.  Wear sunscreen to prevent skin cancer.  Have a dental exam and cleaning every 6 months.  Yearly exams  See your health care team every year to talk about:  Any changes in your health.  Any medicines your care team has prescribed.  Preventive care, family planning, and ways to prevent chronic diseases.  Shots (vaccines)   HPV shots (up to age 26), if you've never had them before.  Hepatitis B shots (up to age 59), if you've never had them before.  COVID-19 shot: Get this shot when it's due.  Flu shot: Get a flu shot every year.  Tetanus shot: Get a tetanus shot every 10 years.  Pneumococcal, hepatitis A, and RSV shots: Ask your care team if you need these based on your risk.  Shingles shot (for age 50 and up)  General health tests  Diabetes screening:  Starting at age 35, Get screened for diabetes at least every 3 years.  If you are younger than age 35, ask your care team if you should be screened for diabetes.  Cholesterol test: At age 39, start having a cholesterol test every 5 years, or more often if advised.  Bone density scan (DEXA): At age 50, ask your care team if you  should have this scan for osteoporosis (brittle bones).  Hepatitis C: Get tested at least once in your life.  STIs (sexually transmitted infections)  Before age 24: Ask your care team if you should be screened for STIs.  After age 24: Get screened for STIs if you're at risk. You are at risk for STIs (including HIV) if:  You are sexually active with more than one person.  You don't use condoms every time.  You or a partner was diagnosed with a sexually transmitted infection.  If you are at risk for HIV, ask about PrEP medicine to prevent HIV.  Get tested for HIV at least once in your life, whether you are at risk for HIV or not.  Cancer screening tests  Cervical cancer screening: If you have a cervix, begin getting regular cervical cancer screening tests starting at age 21.  Breast cancer scan (mammogram): If you've ever had breasts, begin having regular mammograms starting at age 40. This is a scan to check for breast cancer.  Colon cancer screening: It is important to start screening for colon cancer at age 45.  Have a colonoscopy test every 10 years (or more often if you're at risk) Or, ask your provider about stool tests like a FIT test every year or Cologuard test every 3 years.  To learn more about your testing options, visit:   .  For help making a decision, visit:   https://bit.ly/lc15581.  Prostate cancer screening test: If you have a prostate, ask your care team if a prostate cancer screening test (PSA) at age 55 is right for you.  Lung cancer screening: If you are a current or former smoker ages 50 to 80, ask your care team if ongoing lung cancer screenings are right for you.  For informational purposes only. Not to replace the advice of your health care provider. Copyright   2023 [employer]. All rights reserved. Clinically reviewed by the [facility] Transitions Program. [employer] 198533 - REV 01/24.

## 2025-07-10 LAB
ALBUMIN SERPL BCG-MCNC: 4.2 G/DL (ref 3.5–5.2)
ALP SERPL-CCNC: 59 U/L (ref 40–150)
ALT SERPL W P-5'-P-CCNC: 53 U/L (ref 0–70)
ANION GAP SERPL CALCULATED.3IONS-SCNC: 7 MMOL/L (ref 7–15)
AST SERPL W P-5'-P-CCNC: 43 U/L (ref 0–45)
BILIRUB SERPL-MCNC: 0.3 MG/DL
BUN SERPL-MCNC: 10.5 MG/DL (ref 6–20)
CALCIUM SERPL-MCNC: 9.6 MG/DL (ref 8.8–10.4)
CHLORIDE SERPL-SCNC: 106 MMOL/L (ref 98–107)
CHOLEST SERPL-MCNC: 255 MG/DL
CREAT SERPL-MCNC: 0.92 MG/DL (ref 0.67–1.17)
EGFRCR SERPLBLD CKD-EPI 2021: >90 ML/MIN/1.73M2
FASTING STATUS PATIENT QL REPORTED: NO
FASTING STATUS PATIENT QL REPORTED: NO
GLUCOSE SERPL-MCNC: 65 MG/DL (ref 70–99)
HCO3 SERPL-SCNC: 27 MMOL/L (ref 22–29)
HDLC SERPL-MCNC: 34 MG/DL
LDLC SERPL CALC-MCNC: 169 MG/DL
NONHDLC SERPL-MCNC: 221 MG/DL
POTASSIUM SERPL-SCNC: 5 MMOL/L (ref 3.4–5.3)
PROT SERPL-MCNC: 7.2 G/DL (ref 6.4–8.3)
SODIUM SERPL-SCNC: 140 MMOL/L (ref 135–145)
TRIGL SERPL-MCNC: 259 MG/DL

## (undated) DEVICE — TRAY PREP DRY SKIN SCRUB 067

## (undated) DEVICE — NEEDLE SPINAL DISP 22GA X 3.5" QUINCKE 333320

## (undated) DEVICE — BLADE KNIFE SURG 11 371111

## (undated) DEVICE — PREP DYNA-HEX 4% CHG SCRUB 4OZ BOTTLE MDS098710

## (undated) DEVICE — DECANTER VIAL 2006S

## (undated) DEVICE — GLOVE UNDER INDICATOR PI SZ 6.5 LF 41665

## (undated) DEVICE — SU VICRYL+ 0 8-18 CT1/CR UND VCP840D

## (undated) DEVICE — CUSHION INSERT LG PRONE VIEW JACKSON TABLE

## (undated) DEVICE — GLOVE UNDER INDICATOR PI SZ 7.0 LF 41670

## (undated) DEVICE — CUSTOM PACK LUMBAR FUSION SNE5BLFHEA

## (undated) DEVICE — Device

## (undated) DEVICE — RX SURGIFLO HEMOSTATIC MATRIX 8ML 2991

## (undated) DEVICE — TOOL DISSECT MIDAS MR8 14CM MATCH HEAD 3MM MR8-14MH30

## (undated) DEVICE — MARKER SURG SKIN STRL 77734

## (undated) DEVICE — POSITIONER ARM CRADLE LAMINECTOMY DISP

## (undated) DEVICE — GOWN IMPERVIOUS BREATHABLE SMART LG 89015

## (undated) DEVICE — WRAP LUMBAR COMPRESS COLD THERAPY 4632P

## (undated) DEVICE — SUTURE VICRYL+ 2-0 18 CT1/CR VLT VCP839D

## (undated) DEVICE — SOL WATER IRRIG 1000ML BOTTLE 2F7114

## (undated) DEVICE — ESU PENCIL SMOKE EVAC W/ROCKER SWITCH 0703-047-000

## (undated) DEVICE — GLOVE BIOGEL PI ULTRATOUCH G SZ 6.5 42165

## (undated) DEVICE — PITCHER STERILE 1000ML  SSK9004A

## (undated) DEVICE — ADH SKIN CLOSURE PREMIERPRO EXOFIN 1.0ML 3470

## (undated) DEVICE — DRESSING PRIMAPORE 4 X 3-1/8 66000317

## (undated) DEVICE — SUCTION MANIFOLD NEPTUNE 2 SYS 1 PORT 702-025-000

## (undated) DEVICE — PLATE GROUNDING ADULT W/CORD 9165L

## (undated) DEVICE — ALCOHOL ISOPROPYL 4 OZ 70% IA7004

## (undated) DEVICE — ESU ELEC BLADE 2.75" COATED/INSULATED E1455

## (undated) DEVICE — SOL NACL 0.9% IRRIG 1000ML BOTTLE 2F7124

## (undated) DEVICE — PACK COLD 6 X 10 1-HOUR 0814-0610

## (undated) DEVICE — DRSG PRIMAPORE 03 1/8X6" 66000318

## (undated) DEVICE — SU MONOCRYL+ 4-0 18IN PS2 UND MCP496G

## (undated) RX ORDER — FENTANYL CITRATE-0.9 % NACL/PF 10 MCG/ML
PLASTIC BAG, INJECTION (ML) INTRAVENOUS
Status: DISPENSED
Start: 2023-02-17

## (undated) RX ORDER — PROPOFOL 10 MG/ML
INJECTION, EMULSION INTRAVENOUS
Status: DISPENSED
Start: 2023-02-17

## (undated) RX ORDER — FENTANYL CITRATE 50 UG/ML
INJECTION, SOLUTION INTRAMUSCULAR; INTRAVENOUS
Status: DISPENSED
Start: 2023-02-17

## (undated) RX ORDER — DEXMEDETOMIDINE HYDROCHLORIDE 4 UG/ML
INJECTION, SOLUTION INTRAVENOUS
Status: DISPENSED
Start: 2023-02-17

## (undated) RX ORDER — DEXAMETHASONE SODIUM PHOSPHATE 10 MG/ML
INJECTION, EMULSION INTRAMUSCULAR; INTRAVENOUS
Status: DISPENSED
Start: 2023-02-17